# Patient Record
Sex: FEMALE | Race: WHITE | NOT HISPANIC OR LATINO | Employment: PART TIME | URBAN - METROPOLITAN AREA
[De-identification: names, ages, dates, MRNs, and addresses within clinical notes are randomized per-mention and may not be internally consistent; named-entity substitution may affect disease eponyms.]

---

## 2019-05-28 ENCOUNTER — CONVERSION ENCOUNTER (OUTPATIENT)
Dept: FAMILY MEDICINE CLINIC | Facility: CLINIC | Age: 79
End: 2019-05-28

## 2019-06-04 VITALS
HEIGHT: 63 IN | HEART RATE: 64 BPM | OXYGEN SATURATION: 97 % | SYSTOLIC BLOOD PRESSURE: 177 MMHG | DIASTOLIC BLOOD PRESSURE: 70 MMHG | BODY MASS INDEX: 27.11 KG/M2 | WEIGHT: 153 LBS

## 2019-06-06 NOTE — PROGRESS NOTES
"Visit Type:  Follow-up Visit  Primary Care Provider:  Gerda Wadsworth MD    Chief Complaint:  3 month with cholestech: stated bp \"high\" when gets up in am and bp at home runs ~ 130's/ 60-70's : bp recheck -189/75 .    History of Present Illness:    Dear Gerda    CC:  follow-up for coronary artery disease status post previous CABG, history of statin intolerance, heterozygous familial hypercholesterolemia, hypertension    Mrs Elizabeth Whaley  is a very pleasant 78 years old lady with history of:chronic CAD, status post CABG.  hypertension and dyslipidemia. She offers no complaints of chest pain     Hypertension: Her blood pressure was    177/70 although at home it is in the 130s to /60-/ 70s  but she is anxious and nervous and wants to  her rush to her appointment.    Dyslipidemia, She is  taking Crestor 10 mg And Zetia 10 mg daily and is tolerating it well with no  myalgias.      A/P/MDM:    1- CAD,   Status post CABG, no history of angina pectoris.  Stable with the no symptoms of myocardial ischemia.    2-  Uncontrolled hypertension today.  She will  keep a log and will let us know if it is consistently above 140/90 and at that time,  will modify losartan therapy    2-  Dyslipidemia.  Patient is currently tolerating combination of Crestor and ezetimibe  without any problems  Her lipid profile today showed total cholesterol 168 triglycerides 147 HDL 52 LDL of 87.  She has been informed that her LDL is still suboptimal   but she does not wish to increase the Crestor dosage at this time.  She will watch her diet which has been somewhat liberal in  ice cream use every day.  I have suggested that she cut down her ice cream intake substantially. She has  hypertriglyceridemia   (triglycerides of 147 which fits the  criterion for REDUCE-IT study).   I have provided her information about REDUCE-IT study as well as  VASCEPA. She will inform us  if she wishes to proceed and I will be happy to call in a prescription  electronically. "   Ddietary restrictions have been stressed    3- Paroxysmal atrial fibrillation-  with no further recurrence    Thanks very much for allowing us to participate in the care your patients        Vital Signs:    Patient Profile:    78 Years Old Female  Height:     63 inches (160.02 cm)  Weight:     153 pounds  BMI:        27.10     O2 Sat:     97 %  Pulse rate: 64 / minute  Pulse rhythm:   regular- radial pulse x one min.   BP Sittin / 70  (left arm)    Cuff size:  regular      Problems: Active problems were reviewed with the patient during this visit.  Medications: Medications were reviewed with the patient during this visit.  Allergies: Allergies were reviewed with the patient during this visit.        Vitals Entered By: Inge Marroquin LPN (May 28, 2019 8:23 AM)      Past Medical History:     Reviewed history from 2018 and no changes required:        Coronary Heart Disease: S/P PCI         Dyslipidemia        Hx: Palpitations-per holter supraventricular ectopics         GERD        Atrophic Vaginitis         pregnancy xs2        cancer        pneumonia        nervous disease        irregular heart beats        high blood pressure        Cataracts         Aneurysm-stomach    Past Surgical History:     Reviewed history from 2016 and no changes required:        Angioplasty/Stent: May 12,1999-single vessel CAD- Jorge Luis stent mid RCA         Cardiac Cath:May 11, 1999        Cholecystectomy        Hysterectomy        C A B G: x2 SRINATH to RCA and LIMA to LAD 2015        Cataract Extraction: 2016    Active Medications (reviewed today):  LISINOPRIL 20 MG ORAL TABLET (LISINOPRIL) Take 1 tablet by mouth daily  EZETIMIBE 10 MG TABLET (EZETIMIBE) TAKE 1 TABLET EVERY DAY  RESTORIL 15 MG ORAL CAPSULE (TEMAZEPAM) Take 1 tablet by mouth daily  ROSUVASTATIN CALCIUM 10 MG ORAL TABLET (ROSUVASTATIN CALCIUM) Take 1 tablet by mouth daily  NITROSTAT 0.4 MG SUBLINGUAL TABLET SUBLINGUAL (NITROGLYCERIN) AS DIRECTED:  DISSOLVE ONE TABLET UNDER THE TONGUE AS NEEDED FOR CHEST PAIN  OMEPRAZOLE 40 MG ORAL CAPSULE DELAYED RELEASE (OMEPRAZOLE) Take one by mouth daily  CENTRUM SILVER 50+WOMEN ORAL TABLET (MULTIPLE VITAMINS-MINERALS) Take one (1) tablet by mouth every other day.  ASPIR-LOW 81 MG ORAL TABLET DELAYED RELEASE (ASPIRIN) Take 1 tablet by mouth daily  METOPROLOL SUCCINATE ER 25 MG ORAL TABLET EXTENDED RELEASE 24 HOUR (METOPROLOL SUCCINATE) Take 1 tablet by mouth daily    Current Allergies (reviewed today):  CAFFEINE (Critical)  CODEINE (Critical)  DEMEROL (Critical)  LEVAQUIN (Critical)  SULFA (Critical)  * LISINOPRIL (Critical)    Family History Summary:      Reviewed history Last on 02/28/2019 and no changes required:05/28/2019      General Comments - FH:  FH Heart Disease- brother - MI x 5 was age < 55 with first   FH Hypertension-mother   FH Diabetes-mother      Social History:     Reviewed history from 09/18/2013 and no changes required:                2 children         Retired        Risk Factors:     Smoked Tobacco Use:  Former smoker     Cigarettes:  Yes -- .5 pack(s) per day,    Pack-years:  started age 23        Year started:  age 22        Year quit:  2012        Years Since Last Quit:  7   Passive smoke exposure:  no  Drug use:  no  HIV high-risk behavior:  no  Caffeine use:  0 drinks per day  Alcohol use:  yes     Type:  wine- 2 glasses daily   Exercise:  yes     Times per week:  5     Type of Exercise:  walk a mile on TM at speed 2.4  Seatbelt use:  100 %  Sun Exposure:  occasionally    Family History Risk Factors:     Family History of MI in females < 65 years old:  no     Family History of MI in males < 55 years old:  yes        Review of Systems   General: denies fevers, chills, sweats, anorexia, fatigue, malaise, weight loss  Eyes: denies blurring, diplopia, irritation, discharge, vision loss, eye pain, photophobia  Ear/Nose/Throat: denies ear pain or discharge, tinnitus, decreased hearing, nasal  obstruction or discharge, nosebleeds, sore throat, hoarseness, dysphagia  Cardiovascular:  coronary artery disease.  Status post previous PCI stenting.  Recent CABG.  Postoperative atrial fibrillation with no further recurrence.  Dyslipidemia with  hypertriglyceridemia (see H&P).  Respiratory: Denies cough, dyspnea, excessive sputum, hemoptysis, wheezing.  She has has history of a lisinopril induced cough  Gastrointestinal: History of GI bleeding secondary to Xarelto.  Antral polypectomy in 2015  Genitourinary: Recurrent urinary tract infections  Musculoskeletal: denies back pain, joint pain, joint swelling, muscle cramps, muscle weakness, stiffness, arthritis  Skin:    previous history of rash involving both lower legs  Neurologic: denies transient paralysis, weakness, paresthesias, seizures, syncope, tremors, vertigo  Psychiatric: denies depression, anxiety, memory loss, mental disturbance, suicidal ideation, hallucinations, paranoia  Endocrine: Denies cold intolerance, heat intolerance  Hematologic/Lymphatic: Complains of abnormal bruising, bleeding      Physical Exam    General:      well developed, well nourished, in no acute distress.    Head:      normocephalic and atraumatic.    Eyes:      PERRL/EOM intact, conjunctiva and sclera clear with out nystagmus.    Neck:      no masses, thyromegaly, or abnormal cervical nodes.   no JVD. No carotid bruits  Lungs:      clear bilaterally to auscultation.    Heart:      non-displaced PMI, chest non-tender; regular rate and rhythm, S1, S2 without murmurs, rubs, or gallops  Abdomen:       normal bowel sounds; no hepatosplenomegaly no ventral,umbilical hernias or masses noted.      No abnormal pulsations or abdominal bruits  Msk:      no deformity or scoliosis noted of thoracic or lumbar spine.    Pulses:      pulses normal in all 4 extremities.    Extremities:      no clubbing, cyanosis, edema, or deformity noted with normal full range of motion of all joints.     Neurologic:      no focal deficits, cranial nerves II-XII grossly intact with normal sensation, reflexes, coordination, muscle strength and tone.    Skin:      intact without lesions or rashes.      Diabetes Management Exam:      Foot Exam (with socks and/or shoes not present):        Pulses:           pulses normal in all 4 extremities.        Blood Pressure:  Today's BP: 177/70 mm Hg    Labwork:   Most Recent Lab Results:   LDL: 87 mg/dL 10/19/2019      Assessment   New Problems:  Dx of Hypertension (ICD-401.9)  Onset: 05/28/2019  Dx of Hypertriglyceridemia (ICD-272.1)  Onset: 05/28/2019    Plan   New medications:  LOSARTAN POTASSIUM 50 MG ORAL TABLET -- Take one (1) tablet by mouth daily.  Start date: 05/28/2019                         Medication Administration    Orders Added:  1)  Ofc Vst, Est Level IV [15419]    ]      Electronically signed by GARIMA Pires MD on 05/28/2019 at 9:03 AM  ________________________________________________________________________       Disclaimer: Converted Note message may not contain all data elements that existed in the legacy source system. Please see [employer] System for the original note details.

## 2019-07-01 RX ORDER — EZETIMIBE 10 MG/1
TABLET ORAL
Qty: 90 TABLET | Refills: 2 | Status: SHIPPED | OUTPATIENT
Start: 2019-07-01 | End: 2019-09-19

## 2019-09-18 NOTE — PROGRESS NOTES
[facility] CARDIOLOGY      REASON FOR FOLLOW-UP:  follow-up for coronary artery disease status post previous CABG, history of statin intolerance, heterozygous familial hypercholesterolemia, hypertension, paroxysmal atrial fibrillation with no further recurrence            Chief Complaint   Patient presents with   • Coronary Artery Disease     Having sweats for past 3-4 wks   • Hypertension         Dear Dr. Wadsworth,        History of Present Illness      It was my pleasure to see Ms. Whaley in the office today.  As you are aware, she is a very pleasant 78 years old female with history of CAD status post CABG.   She has history of hypertension and dyslipidemia.      Today, the patient reports that for approximately the past 3 to 4 weeks she has experienced spontaneous episodes of diaphoresis, primarily to head and neck area.  She stated the symptoms are very similar to what she experienced just prior to her CABG.  She denies any actual chest pains, pressure, tightness or palpitations.  She denies any shortness of air at rest, dyspnea with exertion, orthopnea or PND.  No lower extremity edema.  Patient stated she can simply be sitting still and suddenly feel very hot/flushed and began sweating.  Patient had recent CT chest/abdomen/pelvis with contrast.  Impression: No clear evidence of metastatic disease in the chest, stable bilateral lung nodules with six-month follow-up CT recommended, emphysema.      Assessment:  Coronary artery disease status post CABG  Dyslipidemia-suboptimally controlled  Paroxysmal atrial fibrillation-no further recurrences  Diaphoresis    Plan:  Patient symptoms are concerning for anginal equivalent.  Her last ischemic evaluation appears to have been 3 years ago.  We will schedule patient for nuclear stress testing to further risk stratify.  Further recommendations following results of testing.        The following portions of the patient's history were reviewed and  updated as appropriate: allergies, current medications, past family history, past medical history, past social history, past surgical history and problem list.    REVIEW OF SYSTEMS:    Review of Systems   Cardiovascular:        Diaphoresis   All other systems reviewed and are negative.      Vitals:    09/19/19 1320   BP: 129/74   Pulse: 68   SpO2: 97%         PHYSICAL EXAM:    General: Alert, cooperative, no distress, appears stated age  Head:  Normocephalic, atraumatic, mucous membranes moist  Eyes:  Conjunctiva/corneas clear, EOM's intact     Neck:  Supple,  no adenopathy;      Lungs: Clear to auscultation bilaterally, no wheezes rhonchi rales are noted  Chest wall: No tenderness  Musculoskeletal:   Ambulates freely without assistance  Heart::  Regular rate and rhythm, S1 and S2 normal, no murmur, rub or gallop  Abdomen: Soft, non-tender, nondistended bowel sounds active  Extremities: No cyanosis, clubbing, or edema   Pulses: 2+ and symmetric all extremities  Skin:  No rashes or lesions  Neuro/psych: A&O x3. CN II through XII are grossly intact with appropriate affect        Past Medical History:   Diagnosis Date   • Atrial fibrillation (CMS/HCC)    • Coronary artery disease    • Hyperlipidemia    • Hypertension        Past Surgical History:   Procedure Laterality Date   • CORONARY ARTERY BYPASS GRAFT           Current Outpatient Medications:   •  aspirin 81 MG tablet, Take  by mouth Daily., Disp: , Rfl:   •  ezetimibe (ZETIA) 10 MG tablet, Daily., Disp: , Rfl:   •  losartan (COZAAR) 50 MG tablet, Daily., Disp: , Rfl:   •  metoprolol succinate XL (TOPROL-XL) 25 MG 24 hr tablet, Daily., Disp: , Rfl:   •  nitrofurantoin (MACRODANTIN) 50 MG capsule, Every Night., Disp: , Rfl: 4  •  nitroglycerin (NITROSTAT) 0.4 MG SL tablet, As Needed., Disp: , Rfl:   •  omeprazole (prilOSEC) 10 MG capsule, Take 10 mg by mouth Daily., Disp: , Rfl:   •  rosuvastatin (CRESTOR) 10 MG tablet, Take 10 mg by mouth Daily., Disp: , Rfl:   •   temazepam (RESTORIL) 15 MG capsule, Take 15 mg by mouth., Disp: , Rfl:     Allergies   Allergen Reactions   • Sulfa Antibiotics Unknown (See Comments) and Rash       History reviewed. No pertinent family history.    Social History     Tobacco Use   • Smoking status: Former Smoker     Packs/day: 1.50     Types: Cigarettes     Last attempt to quit:      Years since quittin.7   • Smokeless tobacco: Never Used   Substance Use Topics   • Alcohol use: Yes     Alcohol/week: 8.4 oz     Types: 14 Glasses of wine per week           Current Electrocardiogram:    ECG 12 Lead  Date/Time: 2019 4:05 PM  Performed by: Georgia Bee APRN  Authorized by: Georgia Bee APRN   Comparison: not compared with previous ECG   Rhythm: sinus rhythm              COCO Garcia  19  12:22 PM

## 2019-09-19 ENCOUNTER — OFFICE VISIT (OUTPATIENT)
Dept: CARDIOLOGY | Facility: CLINIC | Age: 79
End: 2019-09-19

## 2019-09-19 ENCOUNTER — TELEPHONE (OUTPATIENT)
Dept: CARDIOLOGY | Facility: CLINIC | Age: 79
End: 2019-09-19

## 2019-09-19 VITALS
BODY MASS INDEX: 26.75 KG/M2 | WEIGHT: 151 LBS | DIASTOLIC BLOOD PRESSURE: 74 MMHG | SYSTOLIC BLOOD PRESSURE: 129 MMHG | OXYGEN SATURATION: 97 % | HEART RATE: 68 BPM

## 2019-09-19 DIAGNOSIS — R61 DIAPHORESIS: ICD-10-CM

## 2019-09-19 DIAGNOSIS — R07.89 CHEST PAIN, ATYPICAL: ICD-10-CM

## 2019-09-19 DIAGNOSIS — I25.119 CORONARY ARTERY DISEASE WITH ANGINA PECTORIS, UNSPECIFIED VESSEL OR LESION TYPE, UNSPECIFIED WHETHER NATIVE OR TRANSPLANTED HEART (HCC): Primary | ICD-10-CM

## 2019-09-19 DIAGNOSIS — Z95.1 S/P CABG (CORONARY ARTERY BYPASS GRAFT): ICD-10-CM

## 2019-09-19 PROCEDURE — 99213 OFFICE O/P EST LOW 20 MIN: CPT | Performed by: NURSE PRACTITIONER

## 2019-09-19 RX ORDER — LOSARTAN POTASSIUM 50 MG/1
75 TABLET ORAL DAILY
COMMUNITY
Start: 2019-08-12

## 2019-09-19 RX ORDER — NITROGLYCERIN 0.4 MG/1
TABLET SUBLINGUAL AS NEEDED
COMMUNITY
Start: 2015-07-02 | End: 2022-06-10

## 2019-09-19 RX ORDER — OMEPRAZOLE 10 MG/1
10 CAPSULE, DELAYED RELEASE ORAL DAILY
COMMUNITY
End: 2021-06-09

## 2019-09-19 RX ORDER — EZETIMIBE 10 MG/1
TABLET ORAL EVERY 24 HOURS
COMMUNITY
Start: 2018-07-16 | End: 2020-01-07 | Stop reason: SDUPTHER

## 2019-09-19 RX ORDER — METOPROLOL SUCCINATE 25 MG/1
TABLET, EXTENDED RELEASE ORAL DAILY
COMMUNITY
Start: 2019-08-12 | End: 2019-11-25 | Stop reason: SDUPTHER

## 2019-09-19 RX ORDER — ROSUVASTATIN CALCIUM 10 MG/1
10 TABLET, COATED ORAL DAILY
COMMUNITY
End: 2019-09-23 | Stop reason: SDUPTHER

## 2019-09-19 RX ORDER — NITROFURANTOIN MACROCRYSTALS 50 MG/1
50 CAPSULE ORAL NIGHTLY
Refills: 4 | COMMUNITY
Start: 2019-08-18

## 2019-09-19 RX ORDER — TEMAZEPAM 15 MG/1
15 CAPSULE ORAL NIGHTLY PRN
COMMUNITY
Start: 2017-07-19

## 2019-09-19 NOTE — TELEPHONE ENCOUNTER
Patient called office states has recently been feeling bad / episodes of diaphoresis - symptoms are intermittent.  Requested appt / advised Dr. Pires out of office will schedule appt with Moon Bee NP . Pt agreeable.

## 2019-09-20 PROBLEM — R61 DIAPHORESIS: Status: ACTIVE | Noted: 2019-09-20

## 2019-09-20 PROBLEM — I25.119 CORONARY ARTERY DISEASE WITH ANGINA PECTORIS: Status: ACTIVE | Noted: 2019-09-20

## 2019-09-20 PROBLEM — Z95.1 S/P CABG (CORONARY ARTERY BYPASS GRAFT): Status: ACTIVE | Noted: 2019-09-20

## 2019-09-23 RX ORDER — ROSUVASTATIN CALCIUM 10 MG/1
TABLET, COATED ORAL
Qty: 90 TABLET | Refills: 2 | Status: SHIPPED | OUTPATIENT
Start: 2019-09-23 | End: 2020-03-23 | Stop reason: SDUPTHER

## 2019-09-26 ENCOUNTER — HOSPITAL ENCOUNTER (OUTPATIENT)
Dept: CARDIOLOGY | Facility: HOSPITAL | Age: 79
Discharge: HOME | End: 2019-09-26

## 2019-09-26 ENCOUNTER — HOSPITAL ENCOUNTER (OUTPATIENT)
Dept: CARDIOLOGY | Facility: HOSPITAL | Age: 79
Discharge: HOME | End: 2019-09-26
Admitting: NURSE PRACTITIONER

## 2019-09-26 DIAGNOSIS — R07.89 CHEST PAIN, ATYPICAL: ICD-10-CM

## 2019-09-26 DIAGNOSIS — I25.119 CORONARY ARTERY DISEASE WITH ANGINA PECTORIS, UNSPECIFIED VESSEL OR LESION TYPE, UNSPECIFIED WHETHER NATIVE OR TRANSPLANTED HEART (HCC): ICD-10-CM

## 2019-09-26 PROCEDURE — 0 TECHNETIUM SESTAMIBI: Performed by: INTERNAL MEDICINE

## 2019-09-26 PROCEDURE — 93017 CV STRESS TEST TRACING ONLY: CPT

## 2019-09-26 PROCEDURE — A9500 TC99M SESTAMIBI: HCPCS | Performed by: INTERNAL MEDICINE

## 2019-09-26 PROCEDURE — 78452 HT MUSCLE IMAGE SPECT MULT: CPT

## 2019-09-26 PROCEDURE — 0 TECHNETIUM SESTAMIBI: Performed by: NURSE PRACTITIONER

## 2019-09-26 PROCEDURE — A9500 TC99M SESTAMIBI: HCPCS | Performed by: NURSE PRACTITIONER

## 2019-09-26 RX ADMIN — TECHNETIUM TC 99M SESTAMIBI 1 DOSE: 1 INJECTION INTRAVENOUS at 09:55

## 2019-09-26 RX ADMIN — TECHNETIUM TC 99M SESTAMIBI 1 DOSE: 1 INJECTION INTRAVENOUS at 08:35

## 2019-09-30 LAB
BH CV STRESS BP STAGE 1: NORMAL
BH CV STRESS BP STAGE 2: NORMAL
BH CV STRESS BP STAGE 3: NORMAL
BH CV STRESS DURATION MIN STAGE 1: 3
BH CV STRESS DURATION MIN STAGE 2: 3
BH CV STRESS DURATION MIN STAGE 3: 3
BH CV STRESS DURATION SEC STAGE 1: 0
BH CV STRESS DURATION SEC STAGE 2: 0
BH CV STRESS DURATION SEC STAGE 3: 0
BH CV STRESS GRADE STAGE 1: 10
BH CV STRESS GRADE STAGE 2: 12
BH CV STRESS GRADE STAGE 3: 14
BH CV STRESS HR STAGE 1: 94
BH CV STRESS HR STAGE 2: 108
BH CV STRESS HR STAGE 3: 156
BH CV STRESS METS STAGE 1: 5
BH CV STRESS METS STAGE 2: 7.5
BH CV STRESS METS STAGE 3: 10
BH CV STRESS PROTOCOL 1: NORMAL
BH CV STRESS RECOVERY BP: NORMAL MMHG
BH CV STRESS RECOVERY HR: 103 BPM
BH CV STRESS SPEED STAGE 1: 1.7
BH CV STRESS SPEED STAGE 2: 2.5
BH CV STRESS SPEED STAGE 3: 3.4
BH CV STRESS STAGE 1: 1
BH CV STRESS STAGE 2: 2
BH CV STRESS STAGE 3: 3
LV EF NUC BP: 65 %
MAXIMAL PREDICTED HEART RATE: 143 BPM
PERCENT MAX PREDICTED HR: 109.09 %
STRESS BASELINE BP: NORMAL MMHG
STRESS BASELINE HR: 60 BPM
STRESS PERCENT HR: 128 %
STRESS POST EXERCISE DUR MIN: 7 MIN
STRESS POST EXERCISE DUR SEC: 40 SEC
STRESS POST PEAK BP: NORMAL MMHG
STRESS POST PEAK HR: 156 BPM
STRESS TARGET HR: 122 BPM

## 2019-09-30 PROCEDURE — 93018 CV STRESS TEST I&R ONLY: CPT | Performed by: INTERNAL MEDICINE

## 2019-09-30 PROCEDURE — 93016 CV STRESS TEST SUPVJ ONLY: CPT | Performed by: INTERNAL MEDICINE

## 2019-09-30 PROCEDURE — 78452 HT MUSCLE IMAGE SPECT MULT: CPT | Performed by: INTERNAL MEDICINE

## 2019-10-16 ENCOUNTER — OFFICE VISIT (OUTPATIENT)
Dept: CARDIOLOGY | Facility: CLINIC | Age: 79
End: 2019-10-16

## 2019-10-16 VITALS
BODY MASS INDEX: 26.22 KG/M2 | OXYGEN SATURATION: 96 % | HEIGHT: 63 IN | WEIGHT: 148 LBS | DIASTOLIC BLOOD PRESSURE: 71 MMHG | HEART RATE: 61 BPM | SYSTOLIC BLOOD PRESSURE: 135 MMHG

## 2019-10-16 DIAGNOSIS — I48.0 PAROXYSMAL ATRIAL FIBRILLATION (HCC): ICD-10-CM

## 2019-10-16 DIAGNOSIS — I25.810 CORONARY ARTERY DISEASE INVOLVING CORONARY BYPASS GRAFT OF NATIVE HEART WITHOUT ANGINA PECTORIS: Primary | ICD-10-CM

## 2019-10-16 DIAGNOSIS — E78.2 MIXED HYPERLIPIDEMIA: ICD-10-CM

## 2019-10-16 PROCEDURE — 99213 OFFICE O/P EST LOW 20 MIN: CPT | Performed by: INTERNAL MEDICINE

## 2019-10-16 NOTE — PROGRESS NOTES
"Dear Gerda     CC:  follow-up for coronary artery disease status post previous CABG, history of statin intolerance, heterozygous familial hypercholesterolemia, hypertension     Mrs Elizabeth Whaley  is a very pleasant 78 years old lady with history of:chronic CAD, status post CABG.  hypertension and dyslipidemia. She offers no complaints of chest pain   Was recently seen by our nurse practitioner came on 919 because of unexplained sweating and she had no chest pain dyspnea palpitations presyncope or syncope.  Myocardial perfusion imaging study however showed no evidence of inducible myocardial ischemia.  LV ejection fraction was 65%.    Unfortunately, I am unable to access the EKG from 9/19/2019 in in the Epic system    Patient is feeling fine now with no further episodes of sweating.  She recently saw you in the office in some labs were done.   /71 (BP Location: Right arm)   Pulse 61   Ht 160 cm (63\")   Wt 67.1 kg (148 lb)   SpO2 96%   BMI 26.22 kg/m²      A/P/MDM:     1- CAD,   Status post CABG, no history of angina pectoris.  Stable with the no symptoms of myocardial ischemia..  Recent myocardial perfusion imaging study showed no evidence of ischemia.  Her blood pressure today was 135/71 today    2-  Hypertension stable.    2-  Dyslipidemia.  Patient is currently tolerating combination of Crestor and ezetimibe  without any problems  Her lipid profile last showed total cholesterol 168 triglycerides 147 HDL 52 LDL of 87.  She has been informed that her LDL is still suboptimal   but she does not wish to increase the Crestor dosage at this time.  She will watch her diet which has been somewhat liberal in  ice cream use every day.  I have suggested that she cut down her ice cream intake substantially. She has  hypertriglyceridemia   (triglycerides of 147 which fits the  criterion for REDUCE-IT study).   I have provided her information about REDUCE-IT study as well as  VASCEPA. She will inform us  if she wishes " to proceed and I will be happy to call in a prescription  electronically.        3- Paroxysmal atrial fibrillation-  with no further recurrence     Thanks very much for allowing us to participate in the care your patients           Vital Signs:     Patient Profile:    78 Years Old Female  Height:     63 inches (160.02 cm)  Weight:     153 pounds  BMI:        27.10     O2 Sat:     97 %  Pulse rate: 64 / minute  Pulse rhythm:   regular- radial pulse x one min.   BP Sittin / 70  (left arm)     Cuff size:  regular        Problems: Active problems were reviewed with the patient during this visit.  Medications: Medications were reviewed with the patient during this visit.  Allergies: Allergies were reviewed with the patient during this visit.           Vitals Entered By: Inge Marroquin LPN (May 28, 2019 8:23 AM)        Past Medical History:     Reviewed history from 2018 and no changes required:        Coronary Heart Disease: S/P PCI         Dyslipidemia        Hx: Palpitations-per holter supraventricular ectopics         GERD        Atrophic Vaginitis         pregnancy xs2        cancer        pneumonia        nervous disease        irregular heart beats        high blood pressure        Cataracts         Aneurysm-stomach     Past Surgical History:     Reviewed history from 2016 and no changes required:        Angioplasty/Stent: May 12,1999-single vessel CAD- Jorge Luis stent mid RCA         Cardiac Cath:May 11, 1999        Cholecystectomy        Hysterectomy        C A B G: x2 SRINATH to RCA and LIMA to LAD 2015        Cataract Extraction: 2016     Active Medications (reviewed today):  LISINOPRIL 20 MG ORAL TABLET (LISINOPRIL) Take 1 tablet by mouth daily  EZETIMIBE 10 MG TABLET (EZETIMIBE) TAKE 1 TABLET EVERY DAY  RESTORIL 15 MG ORAL CAPSULE (TEMAZEPAM) Take 1 tablet by mouth daily  ROSUVASTATIN CALCIUM 10 MG ORAL TABLET (ROSUVASTATIN CALCIUM) Take 1 tablet by mouth daily  NITROSTAT 0.4 MG SUBLINGUAL  TABLET SUBLINGUAL (NITROGLYCERIN) AS DIRECTED: DISSOLVE ONE TABLET UNDER THE TONGUE AS NEEDED FOR CHEST PAIN  OMEPRAZOLE 40 MG ORAL CAPSULE DELAYED RELEASE (OMEPRAZOLE) Take one by mouth daily  CENTRUM SILVER 50+WOMEN ORAL TABLET (MULTIPLE VITAMINS-MINERALS) Take one (1) tablet by mouth every other day.  ASPIR-LOW 81 MG ORAL TABLET DELAYED RELEASE (ASPIRIN) Take 1 tablet by mouth daily  METOPROLOL SUCCINATE ER 25 MG ORAL TABLET EXTENDED RELEASE 24 HOUR (METOPROLOL SUCCINATE) Take 1 tablet by mouth daily     Current Allergies (reviewed today):  CAFFEINE (Critical)  CODEINE (Critical)  DEMEROL (Critical)  LEVAQUIN (Critical)  SULFA (Critical)  * LISINOPRIL (Critical)     Family History Summary:      Reviewed history Last on 02/28/2019 and no changes required:05/28/2019        General Comments - FH:  FH Heart Disease- brother - MI x 5 was age < 55 with first   FH Hypertension-mother   FH Diabetes-mother        Social History:     Reviewed history from 09/18/2013 and no changes required:                2 children         Retired           Risk Factors:      Smoked Tobacco Use:  Former smoker     Cigarettes:  Yes -- .5 pack(s) per day,    Pack-years:  started age 23        Year started:  age 22        Year quit:  2012        Years Since Last Quit:  7   Passive smoke exposure:  no  Drug use:  no  HIV high-risk behavior:  no  Caffeine use:  0 drinks per day  Alcohol use:  yes     Type:  wine- 2 glasses daily   Exercise:  yes     Times per week:  5     Type of Exercise:  walk a mile on TM at speed 2.4  Seatbelt use:  100 %  Sun Exposure:  occasionally     Family History Risk Factors:     Family History of MI in females < 65 years old:  no     Family History of MI in males < 55 years old:  yes           Review of Systems   General: denies fevers, chills, sweats, anorexia, fatigue, malaise, weight loss  Eyes: denies blurring, diplopia, irritation, discharge, vision loss, eye pain, photophobia  Ear/Nose/Throat: denies  ear pain or discharge, tinnitus, decreased hearing, nasal obstruction or discharge, nosebleeds, sore throat, hoarseness, dysphagia  Cardiovascular:  coronary artery disease.  Status post previous PCI stenting.  Recent CABG.  Postoperative atrial fibrillation with no further recurrence.  Dyslipidemia with  hypertriglyceridemia (see H&P).  Respiratory: Denies cough, dyspnea, excessive sputum, hemoptysis, wheezing.  She has has history of a lisinopril induced cough  Gastrointestinal: History of GI bleeding secondary to Xarelto.  Antral polypectomy in 2015  Genitourinary: Recurrent urinary tract infections  Musculoskeletal: denies back pain, joint pain, joint swelling, muscle cramps, muscle weakness, stiffness, arthritis  Skin:    previous history of rash involving both lower legs  Neurologic: denies transient paralysis, weakness, paresthesias, seizures, syncope, tremors, vertigo  Psychiatric: denies depression, anxiety, memory loss, mental disturbance, suicidal ideation, hallucinations, paranoia  Endocrine: Denies cold intolerance, heat intolerance  Hematologic/Lymphatic: Complains of abnormal bruising, bleeding        Physical Exam     General:      well developed, well nourished, in no acute distress.    Head:      normocephalic and atraumatic.    Eyes:      PERRL/EOM intact, conjunctiva and sclera clear with out nystagmus.    Neck:      no masses, thyromegaly, or abnormal cervical nodes.   no JVD. No carotid bruits  Lungs:      clear bilaterally to auscultation.    Heart:      non-displaced PMI, chest non-tender; regular rate and rhythm, S1, S2 without murmurs, rubs, or gallops  Abdomen:       normal bowel sounds; no hepatosplenomegaly no ventral,umbilical hernias or masses noted.       No abnormal pulsations or abdominal bruits  Msk:      no deformity or scoliosis noted of thoracic or lumbar spine.    Pulses:      pulses normal in all 4 extremities.    Extremities:      no clubbing, cyanosis, edema, or deformity  noted with normal full range of motion of all joints.    Neurologic:      no focal deficits, cranial nerves II-XII grossly intact with normal sensation, reflexes, coordination, muscle strength and tone.    Skin:      intact without lesions or rashes.

## 2019-11-25 RX ORDER — METOPROLOL SUCCINATE 25 MG/1
TABLET, EXTENDED RELEASE ORAL
Qty: 90 TABLET | Refills: 0 | Status: SHIPPED | OUTPATIENT
Start: 2019-11-25 | End: 2020-02-24 | Stop reason: SDUPTHER

## 2020-01-07 RX ORDER — EZETIMIBE 10 MG/1
10 TABLET ORAL DAILY
Qty: 30 TABLET | Refills: 0 | Status: SHIPPED | OUTPATIENT
Start: 2020-01-07 | End: 2020-02-11 | Stop reason: SDUPTHER

## 2020-02-11 RX ORDER — EZETIMIBE 10 MG/1
10 TABLET ORAL DAILY
Qty: 30 TABLET | Refills: 0 | Status: SHIPPED | OUTPATIENT
Start: 2020-02-11 | End: 2020-03-16

## 2020-02-11 RX ORDER — EZETIMIBE 10 MG/1
10 TABLET ORAL DAILY
Qty: 30 TABLET | Refills: 0 | Status: SHIPPED | OUTPATIENT
Start: 2020-02-11 | End: 2020-05-11

## 2020-02-24 RX ORDER — METOPROLOL SUCCINATE 25 MG/1
25 TABLET, EXTENDED RELEASE ORAL DAILY
Qty: 90 TABLET | Refills: 0 | Status: SHIPPED | OUTPATIENT
Start: 2020-02-24 | End: 2020-06-02 | Stop reason: SDUPTHER

## 2020-02-28 ENCOUNTER — TELEPHONE (OUTPATIENT)
Dept: CARDIOLOGY | Facility: CLINIC | Age: 80
End: 2020-02-28

## 2020-02-28 NOTE — TELEPHONE ENCOUNTER
Patient came in wanting to have blood pressure checked. She had previously stopped by her PCP and they told her to come to our office. I spoke with Mariana and she stated we didnt have a Provider in the office who could address her  blood pressure issue.  I told the patient she could go to local Immediate Care and or ER. I told Patient I could schedule her for a first available appt but she said she would call. Patient was upset and left the office.

## 2020-03-11 ENCOUNTER — ON CAMPUS - OUTPATIENT (AMBULATORY)
Dept: URBAN - METROPOLITAN AREA HOSPITAL 77 | Facility: HOSPITAL | Age: 80
End: 2020-03-11
Payer: COMMERCIAL

## 2020-03-11 DIAGNOSIS — R59.0 LOCALIZED ENLARGED LYMPH NODES: ICD-10-CM

## 2020-03-11 PROCEDURE — 43237 ENDOSCOPIC US EXAM ESOPH: CPT | Performed by: INTERNAL MEDICINE

## 2020-03-16 RX ORDER — EZETIMIBE 10 MG/1
10 TABLET ORAL DAILY
Qty: 30 TABLET | Refills: 0 | Status: SHIPPED | OUTPATIENT
Start: 2020-03-16 | End: 2021-09-07

## 2020-03-23 RX ORDER — ROSUVASTATIN CALCIUM 10 MG/1
10 TABLET, COATED ORAL DAILY
Qty: 90 TABLET | Refills: 0 | Status: SHIPPED | OUTPATIENT
Start: 2020-03-23 | End: 2020-06-29

## 2020-04-29 ENCOUNTER — OFFICE VISIT (OUTPATIENT)
Dept: CARDIOLOGY | Facility: CLINIC | Age: 80
End: 2020-04-29

## 2020-04-29 VITALS
HEIGHT: 62 IN | HEART RATE: 57 BPM | WEIGHT: 148 LBS | OXYGEN SATURATION: 50 % | SYSTOLIC BLOOD PRESSURE: 158 MMHG | DIASTOLIC BLOOD PRESSURE: 63 MMHG | BODY MASS INDEX: 27.23 KG/M2

## 2020-04-29 DIAGNOSIS — I10 ESSENTIAL HYPERTENSION: ICD-10-CM

## 2020-04-29 DIAGNOSIS — I25.10 CORONARY ARTERY DISEASE INVOLVING NATIVE CORONARY ARTERY OF NATIVE HEART WITHOUT ANGINA PECTORIS: Primary | ICD-10-CM

## 2020-04-29 DIAGNOSIS — Z95.1 S/P CABG (CORONARY ARTERY BYPASS GRAFT): ICD-10-CM

## 2020-04-29 DIAGNOSIS — Z79.02 LONG TERM (CURRENT) USE OF ANTITHROMBOTICS/ANTIPLATELETS: ICD-10-CM

## 2020-04-29 DIAGNOSIS — E78.2 MIXED HYPERLIPIDEMIA: ICD-10-CM

## 2020-04-29 DIAGNOSIS — I25.119 CORONARY ARTERY DISEASE WITH ANGINA PECTORIS, UNSPECIFIED VESSEL OR LESION TYPE, UNSPECIFIED WHETHER NATIVE OR TRANSPLANTED HEART (HCC): ICD-10-CM

## 2020-04-29 PROCEDURE — 93000 ELECTROCARDIOGRAM COMPLETE: CPT | Performed by: INTERNAL MEDICINE

## 2020-04-29 PROCEDURE — 99214 OFFICE O/P EST MOD 30 MIN: CPT | Performed by: INTERNAL MEDICINE

## 2020-04-29 RX ORDER — ESCITALOPRAM OXALATE 5 MG/1
5 TABLET ORAL NIGHTLY
COMMUNITY

## 2020-04-29 NOTE — PROGRESS NOTES
"Cardiology Office Visit      Encounter Date:  04/29/2020    Patient ID:   Elizabeth Whaley is a 79 y.o. female.    Reason For Followup:  Coronary artery disease  Hypertension  Hypertensive cardiovascular disease  Hyperlipidemia    Brief Clinical History:  Dear Dr. Wadsworth, Gerda Diamond MD    I had the pleasure of seeing Elizabeth Whaley today. As you are well aware, this is a 79 y.o. female with a history of coronary artery disease she is undergone coronary arterial bypass grafting in 2015.  She has additional history that includes hypertension, hypertensive cardiovascular disease, hyperlipidemia, and antiplatelet therapy.  She presents today for follow-up on the above conditions.    Interval History:  She denies any chest pain pressure heaviness or tightness.  She denies any shortness of breath.  She denies any PND orthopnea.  She denies any syncope or near syncope.  She reports feeling well from a cardiac perspective.    She is currently furloughed from [employer].  She reports that her blood pressure is normally well controlled.  In fact this morning before the office visit, her blood pressure was 124/67.  She had a nuclear stress test in November that demonstrated no evidence of ischemia.    Assessment & Plan    Impressions:  Coronary artery disease status post two-vessel CABG in May 2015      SRINATH-RCA, LIMA-LAD  Hypertension  Hypertensive cardiovascular disease  Hyperlipidemia    Recommendations:  Continuation of her current cardiovascular regimen at the present time.  Follow-up in 6 months time sooner should there be difficulties.    Objective:    Vitals:  Vitals:    04/29/20 0847   BP: 158/63   Pulse: 57   SpO2: (!) 50%   Weight: 67.1 kg (148 lb)   Height: 157.5 cm (62\")       Physical Exam:    General: Alert, cooperative, no distress, appears stated age  Head:  Normocephalic, atraumatic, mucous membranes moist  Eyes:  Conjunctiva/corneas clear, EOM's intact     Neck:  Supple,  no bruit  Lungs: Clear " to auscultation bilaterally, no wheezes rhonchi rales are noted  Chest wall: No tenderness  Heart::  Regular rate and rhythm, S1 and S2 normal, 1/6 holosystolic murmur.  No rub or gallop  Abdomen: Soft, non-tender, nondistended bowel sounds active  Extremities: No cyanosis, clubbing, or edema  Pulses: 2+ and symmetric all extremities  Skin:  No rashes or lesions  Neuro/psych: A&O x3. CN II through XII are grossly intact with appropriate affect      Allergies:  Allergies   Allergen Reactions   • Sulfa Antibiotics Unknown (See Comments) and Rash       Medication Review:     Current Outpatient Medications:   •  aspirin 81 MG tablet, Take  by mouth Daily., Disp: , Rfl:   •  escitalopram (LEXAPRO) 5 MG tablet, Take 5 mg by mouth Daily., Disp: , Rfl:   •  ezetimibe (ZETIA) 10 MG tablet, TAKE 1 TABLET BY MOUTH DAILY, Disp: 30 tablet, Rfl: 0  •  losartan (COZAAR) 50 MG tablet, Daily. Take 1 1/2 tab daily, Disp: , Rfl:   •  metoprolol succinate XL (TOPROL-XL) 25 MG 24 hr tablet, Take 1 tablet by mouth Daily., Disp: 90 tablet, Rfl: 0  •  nitrofurantoin (MACRODANTIN) 50 MG capsule, Every Night., Disp: , Rfl: 4  •  nitroglycerin (NITROSTAT) 0.4 MG SL tablet, As Needed., Disp: , Rfl:   •  omeprazole (prilOSEC) 10 MG capsule, Take 10 mg by mouth Daily., Disp: , Rfl:   •  rosuvastatin (CRESTOR) 10 MG tablet, Take 1 tablet by mouth Daily., Disp: 90 tablet, Rfl: 0  •  temazepam (RESTORIL) 15 MG capsule, Take 15 mg by mouth., Disp: , Rfl:   •  ezetimibe (ZETIA) 10 MG tablet, TAKE 1 TABLET BY MOUTH DAILY, Disp: 30 tablet, Rfl: 0    Family History:  Family History   Problem Relation Age of Onset   • Diabetes Mother        Past Medical History:  Past Medical History:   Diagnosis Date   • Atrial fibrillation (CMS/HCC)    • Coronary artery disease    • Hyperlipidemia    • Hypertension    • Palpitations        Past surgical History:  Past Surgical History:   Procedure Laterality Date   • CARDIAC CATHETERIZATION     • CORONARY ARTERY BYPASS  GRAFT         Social History:  Social History     Socioeconomic History   • Marital status:      Spouse name: Not on file   • Number of children: Not on file   • Years of education: Not on file   • Highest education level: Not on file   Tobacco Use   • Smoking status: Former Smoker     Packs/day: 1.50     Types: Cigarettes     Last attempt to quit: 2015     Years since quittin.3   • Smokeless tobacco: Never Used   Substance and Sexual Activity   • Alcohol use: Yes     Alcohol/week: 14.0 standard drinks     Types: 14 Glasses of wine per week     Comment: routinely    • Drug use: No       Review of Systems:  The following systems were reviewed as they relate to the cardiovascular system: Constitutional, Eyes, ENT, Cardiovascular, Respiratory, Gastrointestinal, Integumentary, Neurological, Psychiatric, Hematologic, Endocrine, Musculoskeletal, and Genitourinary. The pertinent cardiovascular findings are reported above with all other cardiovascular points within those systems being negative.    Diagnostic Study Review:     Current Electrocardiogram:    ECG 12 Lead  Date/Time: 2020 12:43 PM  Performed by: Jagdeep Perez DO  Authorized by: Jagdeep Perez DO   Comparison: not compared with previous ECG   Previous ECG: no previous ECG available  Comments: Sinus bradycardia with a ventricular to 57 bpm.  Right axis deviation.  Normal QT and QTc intervals.              NOTE: The following portions of the patient's history were reviewed and updated this visit as appropriate: allergies, current medications, past family history, past medical history, past social history, past surgical history and problem list.

## 2020-05-11 RX ORDER — EZETIMIBE 10 MG/1
10 TABLET ORAL DAILY
Qty: 30 TABLET | Refills: 0 | Status: SHIPPED | OUTPATIENT
Start: 2020-05-11 | End: 2020-06-15

## 2020-06-02 RX ORDER — METOPROLOL SUCCINATE 25 MG/1
25 TABLET, EXTENDED RELEASE ORAL DAILY
Qty: 90 TABLET | Refills: 0 | Status: SHIPPED | OUTPATIENT
Start: 2020-06-02 | End: 2020-06-03 | Stop reason: SDUPTHER

## 2020-06-03 RX ORDER — METOPROLOL SUCCINATE 25 MG/1
25 TABLET, EXTENDED RELEASE ORAL DAILY
Qty: 90 TABLET | Refills: 2 | Status: SHIPPED | OUTPATIENT
Start: 2020-06-03 | End: 2021-02-01

## 2020-06-15 RX ORDER — EZETIMIBE 10 MG/1
10 TABLET ORAL DAILY
Qty: 30 TABLET | Refills: 5 | Status: SHIPPED | OUTPATIENT
Start: 2020-06-15 | End: 2020-12-07

## 2020-06-29 RX ORDER — ROSUVASTATIN CALCIUM 10 MG/1
10 TABLET, COATED ORAL DAILY
Qty: 90 TABLET | Refills: 0 | Status: SHIPPED | OUTPATIENT
Start: 2020-06-29 | End: 2020-09-28

## 2020-07-20 ENCOUNTER — ON CAMPUS - OUTPATIENT (AMBULATORY)
Dept: URBAN - METROPOLITAN AREA HOSPITAL 77 | Facility: HOSPITAL | Age: 80
End: 2020-07-20
Payer: COMMERCIAL

## 2020-07-20 DIAGNOSIS — R93.3 ABNORMAL FINDINGS ON DIAGNOSTIC IMAGING OF OTHER PARTS OF DI: ICD-10-CM

## 2020-07-20 DIAGNOSIS — K86.1 OTHER CHRONIC PANCREATITIS: ICD-10-CM

## 2020-07-20 DIAGNOSIS — K31.7 POLYP OF STOMACH AND DUODENUM: ICD-10-CM

## 2020-07-20 PROCEDURE — 43251 EGD REMOVE LESION SNARE: CPT | Performed by: INTERNAL MEDICINE

## 2020-07-27 PROCEDURE — 43237 ENDOSCOPIC US EXAM ESOPH: CPT | Mod: 59 | Performed by: INTERNAL MEDICINE

## 2020-08-18 ENCOUNTER — TELEPHONE (OUTPATIENT)
Dept: CARDIOLOGY | Facility: CLINIC | Age: 80
End: 2020-08-18

## 2020-08-18 NOTE — TELEPHONE ENCOUNTER
If her blood pressures in the 50s she needs to come to the office.      Previous Messages      ----- Message -----   From: Laureen Snyder MA   Sent: 8/12/2020   1:44 PM EDT   To: Jagdeep Perez DO   Subject: Low b/p                                           Patient called states that she is feeling dizzy and when standing feels like she is losing her balance.     Patient saw Dr Wadsworth and she suggested the patient contact you regarding b/p being in the 50's.   She did have blood work done with Dr Wadsworth and still waiting results to see if could possibly be low blood sugar.     Patient wants to know if she needs an appt with you or if her meds need to be adjusted     I advised her to contact our office once her lab results came back. She agreed and I told her I would send the message to you as well.     Please advise   thanks         8/18/2020  Per Dr Perez patient needs to be seen. Message given to  to call and schedule an appt.

## 2020-08-19 ENCOUNTER — OFFICE VISIT (OUTPATIENT)
Dept: CARDIOLOGY | Facility: CLINIC | Age: 80
End: 2020-08-19

## 2020-08-19 VITALS
SYSTOLIC BLOOD PRESSURE: 167 MMHG | WEIGHT: 148 LBS | HEART RATE: 64 BPM | RESPIRATION RATE: 18 BRPM | OXYGEN SATURATION: 98 % | HEIGHT: 62 IN | DIASTOLIC BLOOD PRESSURE: 66 MMHG | BODY MASS INDEX: 27.23 KG/M2

## 2020-08-19 DIAGNOSIS — E78.2 MIXED HYPERLIPIDEMIA: ICD-10-CM

## 2020-08-19 DIAGNOSIS — I10 ESSENTIAL HYPERTENSION: ICD-10-CM

## 2020-08-19 DIAGNOSIS — R61 DIAPHORESIS: ICD-10-CM

## 2020-08-19 DIAGNOSIS — Z79.02 LONG TERM (CURRENT) USE OF ANTITHROMBOTICS/ANTIPLATELETS: ICD-10-CM

## 2020-08-19 DIAGNOSIS — Z95.1 S/P CABG (CORONARY ARTERY BYPASS GRAFT): ICD-10-CM

## 2020-08-19 DIAGNOSIS — I25.119 CORONARY ARTERY DISEASE WITH ANGINA PECTORIS, UNSPECIFIED VESSEL OR LESION TYPE, UNSPECIFIED WHETHER NATIVE OR TRANSPLANTED HEART (HCC): Primary | ICD-10-CM

## 2020-08-19 PROCEDURE — 99214 OFFICE O/P EST MOD 30 MIN: CPT | Performed by: INTERNAL MEDICINE

## 2020-08-19 RX ORDER — FLUTICASONE PROPIONATE 50 MCG
SPRAY, SUSPENSION (ML) NASAL
COMMUNITY
Start: 2020-02-13 | End: 2021-06-09

## 2020-08-19 NOTE — PROGRESS NOTES
Cardiology Office Visit      Encounter Date:  08/19/2020    Patient ID:   Elizabeth Whaley is a 79 y.o. female.    Reason For Followup:  Coronary artery disease  Hypertension  Hypertensive cardiovascular disease  Hyperlipidemia    Brief Clinical History:  Dear Dr. Wadsworth, Gerda Diamond MD    I had the pleasure of seeing Elizabeth Whaley today. As you are well aware, this is a 79 y.o. female with a history of coronary artery disease she is undergone coronary arterial bypass grafting in 2015.  She has additional history that includes hypertension, hypertensive cardiovascular disease, hyperlipidemia, and antiplatelet therapy.  She presents today for follow-up on the above conditions.    Interval History:  She denies any chest pain pressure heaviness or tightness.  She denies any shortness of breath.  She denies any PND orthopnea.  She denies any syncope or near syncope.  She reports feeling well from a cardiac perspective.    Her blood pressure is elevated today however she reports that her blood pressure is normally well controlled.  She had a nuclear stress test in November that demonstrated no evidence of ischemia.    She reports that she continues to have some very bizarre sweating episodes.  She feels like what she thinks 1 would feel when the blood sugar is low.  She states if she eats a piece of candy after some time she feels normal.  She has not checked her blood sugar or blood pressure during the spells.  She does not know what her heart rate is during these episodes either.  She has a history of bradycardia with rates in the 50s.  Her heart rate is normal today at 64.  She wears a fit bit and she notes that her heart rate ranges are never below 50 beats a minute.  We discussed options and will continue with her current medical regimen at the present time.  She will try to remember to take her glucose monitor with her as well as her blood pressure cuff so that she can check her blood pressure and blood sugars when  "these episodes occur.  We can also consider decreasing her beta-blocker dosage if these continue to be problematic episodes.    Assessment & Plan    Impressions:  Coronary artery disease status post two-vessel CABG in May 2015      SRINATH-RCA, LIMA-LAD  Hypertension  Hypertensive cardiovascular disease  Hyperlipidemia  Sweating episodes    Recommendations:  Continuation of her current cardiovascular regimen at the present time.  Follow-up in 3 months time as currently scheduled.    Objective:    Vitals:  Vitals:    08/19/20 1348   BP: 167/66   BP Location: Left arm   Patient Position: Sitting   Cuff Size: Large Adult   Pulse: 64   Resp: 18   SpO2: 98%   Weight: 67.1 kg (148 lb)   Height: 157.5 cm (62\")       Physical Exam:    General: Alert, cooperative, no distress, appears stated age  Head:  Normocephalic, atraumatic, mucous membranes moist  Eyes:  Conjunctiva/corneas clear, EOM's intact     Neck:  Supple,  no bruit  Lungs: Clear to auscultation bilaterally, no wheezes rhonchi rales are noted  Chest wall: No tenderness  Heart::  Regular rate and rhythm, S1 and S2 normal, 1/6 holosystolic murmur.  No rub or gallop  Abdomen: Soft, non-tender, nondistended bowel sounds active  Extremities: No cyanosis, clubbing, or edema  Pulses: 2+ and symmetric all extremities  Skin:  No rashes or lesions  Neuro/psych: A&O x3. CN II through XII are grossly intact with appropriate affect      Allergies:  Allergies   Allergen Reactions   • Sulfa Antibiotics Unknown (See Comments) and Rash       Medication Review:     Current Outpatient Medications:   •  aspirin 81 MG tablet, Take  by mouth Daily., Disp: , Rfl:   •  escitalopram (LEXAPRO) 5 MG tablet, Take 5 mg by mouth Daily., Disp: , Rfl:   •  ezetimibe (ZETIA) 10 MG tablet, TAKE 1 TABLET BY MOUTH DAILY, Disp: 30 tablet, Rfl: 0  •  ezetimibe (ZETIA) 10 MG tablet, TAKE 1 TABLET BY MOUTH DAILY, Disp: 30 tablet, Rfl: 5  •  fluticasone (FLONASE) 50 MCG/ACT nasal spray, , Disp: , Rfl:   •  " losartan (COZAAR) 50 MG tablet, Daily. Take 1 1/2 tab daily, Disp: , Rfl:   •  metoprolol succinate XL (TOPROL-XL) 25 MG 24 hr tablet, Take 1 tablet by mouth Daily., Disp: 90 tablet, Rfl: 2  •  nitrofurantoin (MACRODANTIN) 50 MG capsule, Every Night., Disp: , Rfl: 4  •  nitroglycerin (NITROSTAT) 0.4 MG SL tablet, As Needed., Disp: , Rfl:   •  omeprazole (prilOSEC) 10 MG capsule, Take 10 mg by mouth Daily., Disp: , Rfl:   •  rosuvastatin (CRESTOR) 10 MG tablet, TAKE 1 TABLET BY MOUTH DAILY, Disp: 90 tablet, Rfl: 0  •  temazepam (RESTORIL) 15 MG capsule, Take 15 mg by mouth., Disp: , Rfl:     Family History:  Family History   Problem Relation Age of Onset   • Diabetes Mother        Past Medical History:  Past Medical History:   Diagnosis Date   • Atrial fibrillation (CMS/HCC)    • Coronary artery disease    • Hyperlipidemia    • Hypertension    • Palpitations        Past surgical History:  Past Surgical History:   Procedure Laterality Date   • CARDIAC CATHETERIZATION     • CORONARY ARTERY BYPASS GRAFT         Social History:  Social History     Socioeconomic History   • Marital status:      Spouse name: Not on file   • Number of children: Not on file   • Years of education: Not on file   • Highest education level: Not on file   Tobacco Use   • Smoking status: Former Smoker     Packs/day: 1.50     Types: Cigarettes     Last attempt to quit:      Years since quittin.6   • Smokeless tobacco: Never Used   Substance and Sexual Activity   • Alcohol use: Yes     Alcohol/week: 14.0 standard drinks     Types: 14 Glasses of wine per week     Comment: routinely    • Drug use: No       Review of Systems:  The following systems were reviewed as they relate to the cardiovascular system: Constitutional, Eyes, ENT, Cardiovascular, Respiratory, Gastrointestinal, Integumentary, Neurological, Psychiatric, Hematologic, Endocrine, Musculoskeletal, and Genitourinary. The pertinent cardiovascular findings are reported above  with all other cardiovascular points within those systems being negative.    Diagnostic Study Review:     Current Electrocardiogram:  Procedures no new EKG.  EKG dated 4/29/2020 demonstrates sinus rhythm with a ventricular rate of 57 bpm.  Right axis deviation.      NOTE: The following portions of the patient's history were reviewed and updated this visit as appropriate: allergies, current medications, past family history, past medical history, past social history, past surgical history and problem list.

## 2020-09-28 RX ORDER — ROSUVASTATIN CALCIUM 10 MG/1
10 TABLET, COATED ORAL DAILY
Qty: 90 TABLET | Refills: 0 | Status: SHIPPED | OUTPATIENT
Start: 2020-09-28 | End: 2020-12-29

## 2020-10-14 ENCOUNTER — OFFICE (AMBULATORY)
Dept: URBAN - METROPOLITAN AREA CLINIC 64 | Facility: CLINIC | Age: 80
End: 2020-10-14
Payer: COMMERCIAL

## 2020-10-14 VITALS
WEIGHT: 154 LBS | HEART RATE: 79 BPM | SYSTOLIC BLOOD PRESSURE: 151 MMHG | HEIGHT: 62 IN | DIASTOLIC BLOOD PRESSURE: 75 MMHG

## 2020-10-14 DIAGNOSIS — R93.3 ABNORMAL FINDINGS ON DIAGNOSTIC IMAGING OF OTHER PARTS OF DI: ICD-10-CM

## 2020-10-14 DIAGNOSIS — K21.9 GASTRO-ESOPHAGEAL REFLUX DISEASE WITHOUT ESOPHAGITIS: ICD-10-CM

## 2020-10-14 DIAGNOSIS — K31.7 POLYP OF STOMACH AND DUODENUM: ICD-10-CM

## 2020-10-14 PROCEDURE — 99214 OFFICE O/P EST MOD 30 MIN: CPT | Performed by: INTERNAL MEDICINE

## 2020-12-07 RX ORDER — EZETIMIBE 10 MG/1
10 TABLET ORAL DAILY
Qty: 90 TABLET | Refills: 2 | Status: SHIPPED | OUTPATIENT
Start: 2020-12-07 | End: 2021-06-09

## 2020-12-09 ENCOUNTER — OFFICE VISIT (OUTPATIENT)
Dept: CARDIOLOGY | Facility: CLINIC | Age: 80
End: 2020-12-09

## 2020-12-09 VITALS
RESPIRATION RATE: 18 BRPM | OXYGEN SATURATION: 100 % | DIASTOLIC BLOOD PRESSURE: 70 MMHG | SYSTOLIC BLOOD PRESSURE: 177 MMHG | HEIGHT: 62 IN | WEIGHT: 152 LBS | HEART RATE: 56 BPM | BODY MASS INDEX: 27.97 KG/M2

## 2020-12-09 DIAGNOSIS — I25.119 CORONARY ARTERY DISEASE WITH ANGINA PECTORIS, UNSPECIFIED VESSEL OR LESION TYPE, UNSPECIFIED WHETHER NATIVE OR TRANSPLANTED HEART (HCC): Primary | ICD-10-CM

## 2020-12-09 PROCEDURE — 99214 OFFICE O/P EST MOD 30 MIN: CPT | Performed by: INTERNAL MEDICINE

## 2020-12-09 PROCEDURE — 93000 ELECTROCARDIOGRAM COMPLETE: CPT | Performed by: INTERNAL MEDICINE

## 2020-12-09 RX ORDER — OMEPRAZOLE 20 MG/1
CAPSULE, DELAYED RELEASE ORAL
COMMUNITY
Start: 2020-10-26 | End: 2022-03-08

## 2020-12-09 NOTE — PROGRESS NOTES
Cardiology Office Visit      Encounter Date:  12/09/2020    Patient ID:   Elizabeth Whaley is a 80 y.o. female.    Reason For Followup:  Coronary artery disease  Hypertension  Hypertensive cardiovascular disease  Hyperlipidemia    Brief Clinical History:  Dear Dr. Wadsworth, Gerda Diamond MD    I had the pleasure of seeing Elizabeth Whaley today. As you are well aware, this is a 80 y.o. female with a history of coronary artery disease she is undergone coronary arterial bypass grafting in 2015.  She has additional history that includes hypertension, hypertensive cardiovascular disease, hyperlipidemia, and antiplatelet therapy.  She presents today for follow-up on the above conditions.    Interval History:  She denies any chest pain pressure heaviness or tightness.  She denies any shortness of breath.  She denies any PND orthopnea.  She denies any syncope or near syncope.  She reports feeling well from a cardiac perspective.    Her blood pressure is elevated today however she reports that her blood pressure is normally well controlled.  We reviewed her blood pressure logs today in detail.  They demonstrate excellent control.  She had a nuclear stress test in September 2019 that demonstrated no evidence of ischemia.    The patient reports feeling remarkably well.  She reports that she had a significant amount of anxiety regarding her previous automobile that has been alleviated by buying a new car.  She feels like this is made a big difference in her stress level.  Her previous car was a maintenance burden and she feels much safer in her new vehicle.    Assessment & Plan    Impressions:  Coronary artery disease status post two-vessel CABG in May 2015      SRINATH-RCA, LIMA-LAD  Hypertension  Hypertensive cardiovascular disease  Hyperlipidemia  Sweating episodes    Recommendations:  Continuation of her current cardiovascular regimen at the present time.  Follow-up in 6 months time sooner should there be  "difficulties.    Objective:    Vitals:  Vitals:    12/09/20 0844   BP: 177/70   BP Location: Left arm   Patient Position: Sitting   Cuff Size: Large Adult   Pulse: 56   Resp: 18   SpO2: 100%   Weight: 68.9 kg (152 lb)   Height: 157.5 cm (62\")       Physical Exam:    General: Alert, cooperative, no distress, appears stated age  Head:  Normocephalic, atraumatic, mucous membranes moist  Eyes:  Conjunctiva/corneas clear, EOM's intact     Neck:  Supple,  no bruit  Lungs: Clear to auscultation bilaterally, no wheezes rhonchi rales are noted  Chest wall: No tenderness  Heart::  Regular rate and rhythm, S1 and S2 normal, 1/6 holosystolic murmur.  No rub or gallop  Abdomen: Soft, non-tender, nondistended bowel sounds active  Extremities: No cyanosis, clubbing, or edema  Pulses: 2+ and symmetric all extremities  Skin:  No rashes or lesions  Neuro/psych: A&O x3. CN II through XII are grossly intact with appropriate affect      Allergies:  Allergies   Allergen Reactions   • Sulfa Antibiotics Unknown (See Comments) and Rash       Medication Review:     Current Outpatient Medications:   •  aspirin 81 MG tablet, Take  by mouth Daily., Disp: , Rfl:   •  escitalopram (LEXAPRO) 5 MG tablet, Take 5 mg by mouth Daily., Disp: , Rfl:   •  ezetimibe (ZETIA) 10 MG tablet, TAKE 1 TABLET BY MOUTH DAILY, Disp: 30 tablet, Rfl: 0  •  ezetimibe (ZETIA) 10 MG tablet, TAKE 1 TABLET BY MOUTH DAILY, Disp: 90 tablet, Rfl: 2  •  fluticasone (FLONASE) 50 MCG/ACT nasal spray, , Disp: , Rfl:   •  losartan (COZAAR) 50 MG tablet, Daily. Take 1 1/2 tab daily, Disp: , Rfl:   •  metoprolol succinate XL (TOPROL-XL) 25 MG 24 hr tablet, Take 1 tablet by mouth Daily., Disp: 90 tablet, Rfl: 2  •  nitrofurantoin (MACRODANTIN) 50 MG capsule, Every Night., Disp: , Rfl: 4  •  nitroglycerin (NITROSTAT) 0.4 MG SL tablet, As Needed., Disp: , Rfl:   •  omeprazole (prilOSEC) 10 MG capsule, Take 10 mg by mouth Daily., Disp: , Rfl:   •  omeprazole (priLOSEC) 20 MG capsule, " TK 1 C PO D, Disp: , Rfl:   •  rosuvastatin (CRESTOR) 10 MG tablet, TAKE 1 TABLET BY MOUTH DAILY, Disp: 90 tablet, Rfl: 0  •  temazepam (RESTORIL) 15 MG capsule, Take 15 mg by mouth., Disp: , Rfl:     Family History:  Family History   Problem Relation Age of Onset   • Diabetes Mother        Past Medical History:  Past Medical History:   Diagnosis Date   • Atrial fibrillation (CMS/HCC)    • Coronary artery disease    • Hyperlipidemia    • Hypertension    • Palpitations        Past surgical History:  Past Surgical History:   Procedure Laterality Date   • CARDIAC CATHETERIZATION     • CORONARY ARTERY BYPASS GRAFT         Social History:  Social History     Socioeconomic History   • Marital status:      Spouse name: Not on file   • Number of children: Not on file   • Years of education: Not on file   • Highest education level: Not on file   Tobacco Use   • Smoking status: Former Smoker     Packs/day: 1.50     Types: Cigarettes     Quit date:      Years since quittin.9   • Smokeless tobacco: Never Used   Substance and Sexual Activity   • Alcohol use: Yes     Alcohol/week: 14.0 standard drinks     Types: 14 Glasses of wine per week     Comment: routinely    • Drug use: No       Review of Systems:  The following systems were reviewed as they relate to the cardiovascular system: Constitutional, Eyes, ENT, Cardiovascular, Respiratory, Gastrointestinal, Integumentary, Neurological, Psychiatric, Hematologic, Endocrine, Musculoskeletal, and Genitourinary. The pertinent cardiovascular findings are reported above with all other cardiovascular points within those systems being negative.    Diagnostic Study Review:     Current Electrocardiogram:    ECG 12 Lead    Date/Time: 2020 12:34 PM  Performed by: Jagdeep Perez DO  Authorized by: Jagdeep Perez DO   Comparison: not compared with previous ECG   Previous ECG: no previous ECG available  Comments: Normal sinus rhythm with a  ventricular rate of 56 bpm.  Left atrial enlargement.  Probable old anterior MI.  Normal QT and QTc intervals.               NOTE: The following portions of the patient's history were reviewed and updated this visit as appropriate: allergies, current medications, past family history, past medical history, past social history, past surgical history and problem list.

## 2020-12-29 RX ORDER — ROSUVASTATIN CALCIUM 10 MG/1
10 TABLET, COATED ORAL DAILY
Qty: 90 TABLET | Refills: 0 | Status: SHIPPED | OUTPATIENT
Start: 2020-12-29 | End: 2021-03-29

## 2021-02-01 RX ORDER — METOPROLOL SUCCINATE 25 MG/1
25 TABLET, EXTENDED RELEASE ORAL DAILY
Qty: 90 TABLET | Refills: 2 | Status: SHIPPED | OUTPATIENT
Start: 2021-02-01 | End: 2021-11-18

## 2021-03-29 RX ORDER — ROSUVASTATIN CALCIUM 10 MG/1
10 TABLET, COATED ORAL DAILY
Qty: 90 TABLET | Refills: 3 | Status: SHIPPED | OUTPATIENT
Start: 2021-03-29 | End: 2022-03-07

## 2021-06-09 ENCOUNTER — OFFICE VISIT (OUTPATIENT)
Dept: CARDIOLOGY | Facility: CLINIC | Age: 81
End: 2021-06-09

## 2021-06-09 VITALS
DIASTOLIC BLOOD PRESSURE: 66 MMHG | HEIGHT: 62 IN | OXYGEN SATURATION: 97 % | SYSTOLIC BLOOD PRESSURE: 173 MMHG | WEIGHT: 156 LBS | RESPIRATION RATE: 18 BRPM | HEART RATE: 64 BPM | BODY MASS INDEX: 28.71 KG/M2

## 2021-06-09 DIAGNOSIS — Z95.1 S/P CABG (CORONARY ARTERY BYPASS GRAFT): ICD-10-CM

## 2021-06-09 DIAGNOSIS — Z79.02 LONG TERM (CURRENT) USE OF ANTITHROMBOTICS/ANTIPLATELETS: ICD-10-CM

## 2021-06-09 DIAGNOSIS — E78.2 MIXED HYPERLIPIDEMIA: ICD-10-CM

## 2021-06-09 DIAGNOSIS — I44.0 FIRST DEGREE AV BLOCK: ICD-10-CM

## 2021-06-09 DIAGNOSIS — I10 ESSENTIAL HYPERTENSION: ICD-10-CM

## 2021-06-09 DIAGNOSIS — I25.119 CORONARY ARTERY DISEASE WITH ANGINA PECTORIS, UNSPECIFIED VESSEL OR LESION TYPE, UNSPECIFIED WHETHER NATIVE OR TRANSPLANTED HEART (HCC): Primary | ICD-10-CM

## 2021-06-09 PROCEDURE — 99214 OFFICE O/P EST MOD 30 MIN: CPT | Performed by: INTERNAL MEDICINE

## 2021-06-09 PROCEDURE — 93000 ELECTROCARDIOGRAM COMPLETE: CPT | Performed by: INTERNAL MEDICINE

## 2021-06-09 RX ORDER — MELOXICAM 15 MG/1
15 TABLET ORAL DAILY
COMMUNITY
Start: 2021-05-04 | End: 2022-01-20

## 2021-06-09 NOTE — PROGRESS NOTES
Cardiology Office Visit      Encounter Date:  06/09/2021    Patient ID:   Elizabeth Whaley is a 80 y.o. female.    Reason For Followup:  Coronary artery disease  Hypertension  Hypertensive cardiovascular disease  Hyperlipidemia    Brief Clinical History:  Dear Dr. Wadsworth, Gerda Diamond MD    I had the pleasure of seeing Elizabeth Whaley today. As you are well aware, this is a 80 y.o. female with a history of coronary artery disease she is undergone coronary arterial bypass grafting in 2015.  She has additional history that includes hypertension, hypertensive cardiovascular disease, hyperlipidemia, and antiplatelet therapy.  She presents today for follow-up on the above conditions.    Interval History:  She denies any chest pain pressure heaviness or tightness.  She denies any shortness of breath.  She denies any PND orthopnea.  She denies any syncope or near syncope.  Other than some occasional hot flashes, she reports feeling well from a cardiac perspective.    Her blood pressure is elevated today however after reviewing her home blood pressure logs, I am satisfied of adequate control.  She does have a situational component to her hypertension.      Her bypass surgery was 6 years ago.  Although she has no overt symptoms of myocardial ischemia, the age of her grafts dictate that routine surveillance should be performed to assess for silent ischemia.  Her most recent nuclear stress test from September 2019 demonstrated no evidence of ischemia.    Assessment & Plan    Impressions:  Coronary artery disease status post two-vessel CABG in May 2015      SRINATH-RCA, LIMA-LAD  Hypertension  Hypertensive cardiovascular disease  Hyperlipidemia  Sweating episodes    Recommendations:  Continuation of her current cardiovascular regimen at the present time.  Screening stress test prior to nex visit  Obtain labs from your office  Follow-up in 6 months time sooner should there be difficulties.    Objective:    Vitals:  Vitals:    06/09/21  "0843   BP: 173/66   BP Location: Left arm   Patient Position: Sitting   Cuff Size: Large Adult   Pulse: 64   Resp: 18   SpO2: 97%   Weight: 70.8 kg (156 lb)   Height: 157.5 cm (62\")       Physical Exam:    General: Alert, cooperative, no distress, appears stated age  Head:  Normocephalic, atraumatic, mucous membranes moist  Eyes:  Conjunctiva/corneas clear, EOM's intact     Neck:  Supple,  no bruit  Lungs: Clear to auscultation bilaterally, no wheezes rhonchi rales are noted  Chest wall: No tenderness  Heart::  Regular rate and rhythm, S1 and S2 normal, 1/6 holosystolic murmur.  No rub or gallop  Abdomen: Soft, non-tender, nondistended bowel sounds active  Extremities: No cyanosis, clubbing, or edema  Pulses: 2+ and symmetric all extremities  Skin:  No rashes or lesions  Neuro/psych: A&O x3. CN II through XII are grossly intact with appropriate affect      Allergies:  Allergies   Allergen Reactions   • Sulfa Antibiotics Unknown (See Comments) and Rash       Medication Review:     Current Outpatient Medications:   •  aspirin 81 MG tablet, Take  by mouth Daily., Disp: , Rfl:   •  escitalopram (LEXAPRO) 5 MG tablet, Take 5 mg by mouth Daily., Disp: , Rfl:   •  ezetimibe (ZETIA) 10 MG tablet, TAKE 1 TABLET BY MOUTH DAILY, Disp: 30 tablet, Rfl: 0  •  losartan (COZAAR) 50 MG tablet, Daily. Take 1 1/2 tab daily, Disp: , Rfl:   •  meloxicam (MOBIC) 15 MG tablet, Take 15 mg by mouth Daily., Disp: , Rfl:   •  metoprolol succinate XL (TOPROL-XL) 25 MG 24 hr tablet, TAKE 1 TABLET BY MOUTH DAILY, Disp: 90 tablet, Rfl: 2  •  nitrofurantoin (MACRODANTIN) 50 MG capsule, Every Night., Disp: , Rfl: 4  •  nitroglycerin (NITROSTAT) 0.4 MG SL tablet, As Needed., Disp: , Rfl:   •  omeprazole (priLOSEC) 20 MG capsule, TK 1 C PO D, Disp: , Rfl:   •  rosuvastatin (CRESTOR) 10 MG tablet, TAKE 1 TABLET BY MOUTH DAILY, Disp: 90 tablet, Rfl: 3  •  temazepam (RESTORIL) 15 MG capsule, Take 15 mg by mouth., Disp: , Rfl:     Family History:  Family " History   Problem Relation Age of Onset   • Diabetes Mother        Past Medical History:  Past Medical History:   Diagnosis Date   • Atrial fibrillation (CMS/HCC)    • Coronary artery disease    • Hyperlipidemia    • Hypertension    • Palpitations        Past surgical History:  Past Surgical History:   Procedure Laterality Date   • CARDIAC CATHETERIZATION     • CORONARY ARTERY BYPASS GRAFT         Social History:  Social History     Socioeconomic History   • Marital status:      Spouse name: Not on file   • Number of children: Not on file   • Years of education: Not on file   • Highest education level: Not on file   Tobacco Use   • Smoking status: Former Smoker     Packs/day: 1.50     Types: Cigarettes     Quit date:      Years since quittin.4   • Smokeless tobacco: Never Used   Vaping Use   • Vaping Use: Never used   Substance and Sexual Activity   • Alcohol use: Yes     Alcohol/week: 14.0 standard drinks     Types: 14 Glasses of wine per week     Comment: routinely    • Drug use: No       Review of Systems:  The following systems were reviewed as they relate to the cardiovascular system: Constitutional, Eyes, ENT, Cardiovascular, Respiratory, Gastrointestinal, Integumentary, Neurological, Psychiatric, Hematologic, Endocrine, Musculoskeletal, and Genitourinary. The pertinent cardiovascular findings are reported above with all other cardiovascular points within those systems being negative.    Diagnostic Study Review:     Current Electrocardiogram:    ECG 12 Lead    Date/Time: 2021 10:03 AM  Performed by: Jagdeep Perez DO  Authorized by: Jagdeep Perez DO   Comparison: not compared with previous ECG   Previous ECG: no previous ECG available  Comments: Normal sinus rhythm with a ventricular rate of 60 bpm.  Consider left atrial enlargement.  Right bundle branch block.  First-degree AV block.  LVH.  Normal QT and QTc intervals.               NOTE: The following portions  of the patient's note were reviewed, confirmed and/or updated this visit as appropriate: History of present illness/Interval history, physical examination, assessment & plan, allergies, current medications, past family history, past medical history, past social history, past surgical history and problem list.

## 2021-06-09 NOTE — PATIENT INSTRUCTIONS
Follow-up in 6 months  Lexiscan stress nuclear prior to next visit  Get labs from [facility] office

## 2021-06-10 PROBLEM — I44.0 FIRST DEGREE AV BLOCK: Status: ACTIVE | Noted: 2021-06-10

## 2021-07-20 ENCOUNTER — HOSPITAL ENCOUNTER (OUTPATIENT)
Dept: CARDIOLOGY | Facility: HOSPITAL | Age: 81
Discharge: HOME | End: 2021-07-20

## 2021-07-20 DIAGNOSIS — I25.119 CORONARY ARTERY DISEASE WITH ANGINA PECTORIS, UNSPECIFIED VESSEL OR LESION TYPE, UNSPECIFIED WHETHER NATIVE OR TRANSPLANTED HEART (HCC): ICD-10-CM

## 2021-07-20 PROCEDURE — 25010000002 REGADENOSON 0.4 MG/5ML SOLUTION: Performed by: INTERNAL MEDICINE

## 2021-07-20 PROCEDURE — 0 TECHNETIUM SESTAMIBI: Performed by: INTERNAL MEDICINE

## 2021-07-20 PROCEDURE — 78452 HT MUSCLE IMAGE SPECT MULT: CPT | Performed by: INTERNAL MEDICINE

## 2021-07-20 PROCEDURE — 78452 HT MUSCLE IMAGE SPECT MULT: CPT

## 2021-07-20 PROCEDURE — A9500 TC99M SESTAMIBI: HCPCS | Performed by: INTERNAL MEDICINE

## 2021-07-20 PROCEDURE — 93017 CV STRESS TEST TRACING ONLY: CPT

## 2021-07-20 PROCEDURE — 93016 CV STRESS TEST SUPVJ ONLY: CPT | Performed by: INTERNAL MEDICINE

## 2021-07-20 PROCEDURE — 93018 CV STRESS TEST I&R ONLY: CPT | Performed by: INTERNAL MEDICINE

## 2021-07-20 RX ADMIN — REGADENOSON 0.4 MG: 0.08 INJECTION, SOLUTION INTRAVENOUS at 09:20

## 2021-07-20 RX ADMIN — TECHNETIUM TC 99M SESTAMIBI 1 DOSE: 1 INJECTION INTRAVENOUS at 07:55

## 2021-07-20 RX ADMIN — TECHNETIUM TC 99M SESTAMIBI 1 DOSE: 1 INJECTION INTRAVENOUS at 09:20

## 2021-07-21 LAB
BH CV REST NUCLEAR ISOTOPE DOSE: 8.6 MCI
BH CV STRESS BP STAGE 1: NORMAL
BH CV STRESS COMMENTS STAGE 1: NORMAL
BH CV STRESS DOSE REGADENOSON STAGE 1: 0.4
BH CV STRESS DURATION MIN STAGE 1: 0
BH CV STRESS DURATION SEC STAGE 1: 10
BH CV STRESS HR STAGE 1: 92
BH CV STRESS NUCLEAR ISOTOPE DOSE: 33.7 MCI
BH CV STRESS PROTOCOL 1: NORMAL
BH CV STRESS RECOVERY BP: NORMAL MMHG
BH CV STRESS RECOVERY HR: 76 BPM
BH CV STRESS STAGE 1: 1
LV EF NUC BP: 72 %
MAXIMAL PREDICTED HEART RATE: 141 BPM
PERCENT MAX PREDICTED HR: 65.25 %
STRESS BASELINE BP: NORMAL MMHG
STRESS BASELINE HR: 60 BPM
STRESS PERCENT HR: 77 %
STRESS POST PEAK BP: NORMAL MMHG
STRESS POST PEAK HR: 92 BPM
STRESS TARGET HR: 120 BPM

## 2021-09-07 RX ORDER — EZETIMIBE 10 MG/1
10 TABLET ORAL DAILY
Qty: 90 TABLET | Refills: 1 | Status: SHIPPED | OUTPATIENT
Start: 2021-09-07 | End: 2022-03-07

## 2021-11-18 RX ORDER — METOPROLOL SUCCINATE 25 MG/1
25 TABLET, EXTENDED RELEASE ORAL DAILY
Qty: 90 TABLET | Refills: 2 | Status: SHIPPED | OUTPATIENT
Start: 2021-11-18 | End: 2022-08-15

## 2022-01-19 PROBLEM — R35.0 INCREASED FREQUENCY OF URINATION: Status: ACTIVE | Noted: 2018-05-14

## 2022-01-19 PROBLEM — R30.0 DIFFICULT OR PAINFUL URINATION: Status: ACTIVE | Noted: 2018-05-14

## 2022-01-19 PROBLEM — R91.8 LUNG NODULES: Status: ACTIVE | Noted: 2020-01-22

## 2022-01-19 PROBLEM — I71.30 RUPTURED ABDOMINAL AORTIC ANEURYSM (HCC): Status: ACTIVE | Noted: 2020-01-22

## 2022-01-19 PROBLEM — R39.15 URINARY URGENCY: Status: ACTIVE | Noted: 2018-05-14

## 2022-01-19 PROBLEM — N63.10 MASS OF BREAST, RIGHT: Status: ACTIVE | Noted: 2020-01-22

## 2022-01-19 PROBLEM — E78.1 HYPERTRIGLYCERIDEMIA: Status: ACTIVE | Noted: 2019-05-28

## 2022-01-19 PROBLEM — R59.1 LYMPHADENOPATHY: Status: ACTIVE | Noted: 2018-04-11

## 2022-01-20 ENCOUNTER — OFFICE VISIT (OUTPATIENT)
Dept: CARDIOLOGY | Facility: CLINIC | Age: 82
End: 2022-01-20

## 2022-01-20 VITALS
BODY MASS INDEX: 28.16 KG/M2 | HEIGHT: 62 IN | SYSTOLIC BLOOD PRESSURE: 168 MMHG | HEART RATE: 60 BPM | WEIGHT: 153 LBS | DIASTOLIC BLOOD PRESSURE: 72 MMHG

## 2022-01-20 DIAGNOSIS — I10 ESSENTIAL HYPERTENSION: ICD-10-CM

## 2022-01-20 DIAGNOSIS — I25.119 CORONARY ARTERY DISEASE WITH ANGINA PECTORIS, UNSPECIFIED VESSEL OR LESION TYPE, UNSPECIFIED WHETHER NATIVE OR TRANSPLANTED HEART: Primary | ICD-10-CM

## 2022-01-20 DIAGNOSIS — E78.2 MIXED HYPERLIPIDEMIA: ICD-10-CM

## 2022-01-20 DIAGNOSIS — I25.10 ARTERIOSCLEROSIS OF CORONARY ARTERY: ICD-10-CM

## 2022-01-20 PROCEDURE — 99214 OFFICE O/P EST MOD 30 MIN: CPT | Performed by: INTERNAL MEDICINE

## 2022-01-20 NOTE — PROGRESS NOTES
Cardiology Office Visit      Encounter Date:  01/20/2022    Patient ID:   Elizabeth Whaley is a 81 y.o. female.    Reason For Followup:  Coronary artery disease  Hypertension  Hypertensive cardiovascular disease  Hyperlipidemia  Antiplatelet therapy    Brief Clinical History:  Dear Dr. Wadsworth, Gerda Diamond MD    I had the pleasure of seeing Elizabeth Whaley today. As you are well aware, this is a 81 y.o. female with a history of coronary artery disease she is undergone coronary arterial bypass grafting in 2015.  She has additional history that includes hypertension, hypertensive cardiovascular disease, hyperlipidemia, and antiplatelet therapy.  She presents today for follow-up on the above conditions.    Interval History:  She denies any chest pain pressure heaviness or tightness.  She denies any shortness of breath.  She denies any PND orthopnea.  She denies any syncope or near syncope.  Other than some occasional hot flashes, she reports feeling well from a cardiac perspective.    Her blood pressure is elevated today however her home readings as per her logs are better.  As you will recall, she has a situational component to her hypertension.    Because of the age of her grafts, she underwent screening stress testing in July 2021.  No evidence of ischemia was identified.    She continues to be active and still works at [employer].  She does report some intermittent pain in her right groin from time to time.    We were able to review her most recent laboratory data from your office.  She pulled this up on her phone.  Total cholesterol 170, triglycerides 120, HDL 52, LDL 97, glucose 94.    Assessment & Plan    Impressions:  Coronary artery disease status post two-vessel CABG in May 2015      SRINATH-RCA, LIMA-LAD  Hypertension  Hypertensive cardiovascular disease  Hyperlipidemia  Sweating episodes    Recommendations:  Continuation of her current cardiovascular regimen at the present time.     This includes antiplatelet therapy,  "antihypertensives, short acting nitrates, and statin therapy.  Periodic routine surveillance laboratory testing  Consider vascular screening bundle  Follow-up in 6 months time sooner should there be difficulties.    Diagnoses and all orders for this visit:    1. Coronary artery disease with angina pectoris, unspecified vessel or lesion type, unspecified whether native or transplanted heart (HCC) (Primary)  -     ECG 12 Lead    2. Arteriosclerosis of coronary artery  -     ECG 12 Lead    3. Essential hypertension  -     ECG 12 Lead    4. Mixed hyperlipidemia          Objective:    Vitals:  Vitals:    01/20/22 0954   BP: 168/72   Pulse: 60   Weight: 69.4 kg (153 lb)   Height: 157.5 cm (62\")     Body mass index is 27.98 kg/m².      Physical Exam:    General: Alert, cooperative, no distress, appears stated age  Head:  Normocephalic, atraumatic, mucous membranes moist  Eyes:  Conjunctiva/corneas clear, EOM's intact     Neck:  Supple,  no bruit    Lungs: Clear to auscultation bilaterally, no wheezes rhonchi rales are noted  Chest wall: No tenderness  Heart::  Regular rate and rhythm, S1 and S2 normal, 1/6 holosystolic murmur.  No, rub or gallop  Abdomen: Soft, non-tender, nondistended bowel sounds active  Extremities: No cyanosis, clubbing, or edema  Pulses: 2+ and symmetric all extremities  Skin:  No rashes or lesions  Neuro/psych: A&O x3. CN II through XII are grossly intact with appropriate affect      Allergies:  Allergies   Allergen Reactions   • Sulfa Antibiotics Unknown (See Comments) and Rash       Medication Review:     Current Outpatient Medications:   •  aspirin 81 MG tablet, Take  by mouth Daily., Disp: , Rfl:   •  escitalopram (LEXAPRO) 5 MG tablet, Take 5 mg by mouth Daily., Disp: , Rfl:   •  ezetimibe (ZETIA) 10 MG tablet, TAKE 1 TABLET BY MOUTH DAILY, Disp: 90 tablet, Rfl: 1  •  losartan (COZAAR) 50 MG tablet, Daily. Take 1 1/2 tab daily, Disp: , Rfl:   •  metoprolol succinate XL (TOPROL-XL) 25 MG 24 hr " tablet, TAKE 1 TABLET BY MOUTH DAILY, Disp: 90 tablet, Rfl: 2  •  nitrofurantoin (MACRODANTIN) 50 MG capsule, Every Night. Daily for UTI prevention, Disp: , Rfl: 4  •  nitroglycerin (NITROSTAT) 0.4 MG SL tablet, As Needed., Disp: , Rfl:   •  omeprazole (priLOSEC) 20 MG capsule, TK 1 C PO D, Disp: , Rfl:   •  rosuvastatin (CRESTOR) 10 MG tablet, TAKE 1 TABLET BY MOUTH DAILY, Disp: 90 tablet, Rfl: 3  •  temazepam (RESTORIL) 15 MG capsule, Take 15 mg by mouth., Disp: , Rfl:   •  vitamin D3 125 MCG (5000 UT) capsule capsule, Take 5,000 Units by mouth Daily., Disp: , Rfl:     Family History:  Family History   Problem Relation Age of Onset   • Diabetes Mother        Past Medical History:  Past Medical History:   Diagnosis Date   • Atrial fibrillation (HCC)    • Coronary artery disease    • Hyperlipidemia    • Hypertension    • Palpitations        Past Surgical History:  Past Surgical History:   Procedure Laterality Date   • CARDIAC CATHETERIZATION     • CORONARY ARTERY BYPASS GRAFT         Social History:  Social History     Socioeconomic History   • Marital status:    Tobacco Use   • Smoking status: Former Smoker     Packs/day: 1.50     Types: Cigarettes     Quit date:      Years since quittin.0   • Smokeless tobacco: Never Used   Vaping Use   • Vaping Use: Never used   Substance and Sexual Activity   • Alcohol use: Yes     Alcohol/week: 14.0 standard drinks     Types: 14 Glasses of wine per week     Comment: routinely    • Drug use: No   • Sexual activity: Defer       Review of Systems:  The following systems were reviewed as they relate to the cardiovascular system: Constitutional, Eyes, ENT, Cardiovascular, Respiratory, Gastrointestinal, Integumentary, Neurological, Psychiatric, Hematologic, Endocrine, Musculoskeletal, and Genitourinary. The pertinent cardiovascular findings are reported above with all other cardiovascular points within those systems being negative.    Diagnostic Study Review:      Current Electrocardiogram:    ECG 12 Lead    Date/Time: 1/21/2022 6:56 PM  Performed by: Jagdeep Perez DO  Authorized by: Jagdeep Perez DO   Comparison: not compared with previous ECG   Previous ECG: no previous ECG available  Comments: Normal sinus rhythm with a ventricular rate of 56 bpm.  Left atrial enlargement.  First-degree AV block.  Normal QT and QTc intervals.  Normal QRS axis.            Laboratory Data:  Lab Results   Component Value Date    BUN 17 01/23/2020    CREATININE 0.5 (L) 01/23/2020    BCR 34.7 01/23/2020    K 4.3 01/23/2020    CO2 28 01/23/2020    CALCIUM 8.8 01/23/2020    ALBUMIN 4.0 01/23/2020    LABIL2 1.3 01/23/2020    AST 30 01/23/2020    ALT 26 01/23/2020     Lab Results   Component Value Date    CALCIUM 8.8 01/23/2020     01/23/2020    K 4.3 01/23/2020    CO2 28 01/23/2020     01/23/2020    BUN 17 01/23/2020    CREATININE 0.5 (L) 01/23/2020    BCR 34.7 01/23/2020     Lab Results   Component Value Date    WBC 3.74 (L) 01/23/2020    HGB 14.4 01/23/2020    HCT 44.5 01/23/2020    MCV 88.1 01/23/2020     (L) 01/23/2020     No results found for: CHOL, CHLPL, TRIG, HDL, LDL, LDLDIRECT  No results found for: HGBA1C  No results found for: INR, PROTIME    Most Recent Echo:       Most Recent Stress Test:  Results for orders placed during the hospital encounter of 07/20/21    Stress Test With Myocardial Perfusion One Day    Interpretation Summary  · Myocardial perfusion imaging indicates a normal myocardial perfusion study with no evidence of ischemia.  · Fixed apical defect likely secondary to technical factors given normal wall motion in this area  · Left ventricular ejection fraction is hyperdynamic (Calculated EF > 70%). .  · Findings consistent with a normal ECG stress test.  · Impressions are consistent with a low risk study.  · There is no prior study available for comparison.       Most Recent Cardiac Catheterization:   No results found for  this or any previous visit.       NOTE: The following portions of the patient's note were reviewed, confirmed and/or updated this visit as appropriate: History of present illness/Interval history, physical examination, assessment & plan, allergies, current medications, past family history, past medical history, past social history, past surgical history and problem list.

## 2022-01-21 PROCEDURE — 93000 ELECTROCARDIOGRAM COMPLETE: CPT | Performed by: INTERNAL MEDICINE

## 2022-01-27 ENCOUNTER — APPOINTMENT (OUTPATIENT)
Dept: CARDIOLOGY | Facility: HOSPITAL | Age: 82
End: 2022-01-27

## 2022-02-08 ENCOUNTER — HOSPITAL ENCOUNTER (OUTPATIENT)
Dept: CARDIOLOGY | Facility: HOSPITAL | Age: 82
Discharge: HOME | End: 2022-02-08
Admitting: INTERNAL MEDICINE

## 2022-02-08 VITALS — BODY MASS INDEX: 28.16 KG/M2 | HEIGHT: 62 IN | WEIGHT: 153 LBS

## 2022-02-08 DIAGNOSIS — Z13.6 SCREENING FOR ISCHEMIC HEART DISEASE: ICD-10-CM

## 2022-02-08 LAB
BH CV ECHO MEAS - DIST AO DIAM: 1.81 CM
BH CV VAS BP LEFT ARM: NORMAL MMHG
BH CV VAS BP RIGHT ARM: NORMAL MMHG
BH CV XLRA MEAS - MID AO DIAM: 2.82 CM
BH CV XLRA MEAS - PAD LEFT ABI DP: 1.1
BH CV XLRA MEAS - PAD LEFT ARM: 143 MMHG
BH CV XLRA MEAS - PAD LEFT LEG DP: 181 MMHG
BH CV XLRA MEAS - PAD RIGHT ABI DP: 1.1
BH CV XLRA MEAS - PAD RIGHT ARM: 164 MMHG
BH CV XLRA MEAS - PAD RIGHT LEG DP: 181 MMHG
BH CV XLRA MEAS - PROX AO DIAM: 1.65 CM
BH CV XLRA MEAS LEFT DIST CCA EDV: 11.8 CM/SEC
BH CV XLRA MEAS LEFT DIST CCA PSV: 140.5 CM/SEC
BH CV XLRA MEAS LEFT ICA/CCA RATIO: 2.59
BH CV XLRA MEAS LEFT MID CCA PSV: 140.5 CM/SEC
BH CV XLRA MEAS LEFT MID ICA PSV: 363.6 CM/SEC
BH CV XLRA MEAS LEFT PROX ECA PSV: 188.4 CM/SEC
BH CV XLRA MEAS LEFT PROX ICA EDV: 48.6 CM/SEC
BH CV XLRA MEAS LEFT PROX ICA PSV: 363.6 CM/SEC
BH CV XLRA MEAS RIGHT DIST CCA EDV: 9.8 CM/SEC
BH CV XLRA MEAS RIGHT DIST CCA PSV: 86.4 CM/SEC
BH CV XLRA MEAS RIGHT ICA/CCA RATIO: 2.27
BH CV XLRA MEAS RIGHT MID CCA PSV: 86.4 CM/SEC
BH CV XLRA MEAS RIGHT MID ICA PSV: 196 CM/SEC
BH CV XLRA MEAS RIGHT PROX ECA PSV: 95.3 CM/SEC
BH CV XLRA MEAS RIGHT PROX ICA EDV: 31.1 CM/SEC
BH CV XLRA MEAS RIGHT PROX ICA PSV: 196 CM/SEC

## 2022-02-08 PROCEDURE — 93799 UNLISTED CV SVC/PROCEDURE: CPT

## 2022-02-17 ENCOUNTER — TELEPHONE (OUTPATIENT)
Dept: CARDIOLOGY | Facility: CLINIC | Age: 82
End: 2022-02-17

## 2022-02-17 NOTE — TELEPHONE ENCOUNTER
Patient calling for results of her vascular screening done last week. Her next appt with you is not until July   67

## 2022-02-21 ENCOUNTER — TELEPHONE (OUTPATIENT)
Dept: CARDIOLOGY | Facility: CLINIC | Age: 82
End: 2022-02-21

## 2022-02-21 DIAGNOSIS — I65.23 INTERNAL CAROTID ARTERY STENOSIS, BILATERAL: Primary | ICD-10-CM

## 2022-02-21 NOTE — TELEPHONE ENCOUNTER
Moon looked at the report and entered a referral to vascular. I called the patient back to let her know and she said she will not go see a vascular surgeon unless you feel it is necessary. She is aware that it will be next week before I get an answer.

## 2022-02-21 NOTE — TELEPHONE ENCOUNTER
I called the patient to let her know that Moon  entered a referral to vascular, she does not want to go to Vascular unless Dr Perez has reviewed the studies and feels it is necessary. She was informed that Dr Perez is out of the office rounding at the hospital until next week and she stated that was fine. I will postpone the referral until I hear from Dr Perez

## 2022-02-21 NOTE — TELEPHONE ENCOUNTER
Patient called last week for her vascular report but I am not sure how to interpret the report- would you look at it for me and let me know what I need to tell her or if she just needs to make an appt to see Dr Perez or a vascular referral?

## 2022-02-22 NOTE — TELEPHONE ENCOUNTER
I spoke with the patient, she is concerned about going to any location not in [place]. I advised her to let them know when they call her.

## 2022-03-04 ENCOUNTER — OFFICE VISIT (OUTPATIENT)
Dept: CARDIOLOGY | Facility: CLINIC | Age: 82
End: 2022-03-04

## 2022-03-04 VITALS
BODY MASS INDEX: 28.52 KG/M2 | HEIGHT: 62 IN | HEART RATE: 67 BPM | WEIGHT: 155 LBS | OXYGEN SATURATION: 91 % | DIASTOLIC BLOOD PRESSURE: 52 MMHG | SYSTOLIC BLOOD PRESSURE: 138 MMHG

## 2022-03-04 DIAGNOSIS — Z95.1 STATUS POST CORONARY ARTERY BYPASS GRAFT: ICD-10-CM

## 2022-03-04 DIAGNOSIS — Z79.02 LONG TERM (CURRENT) USE OF ANTITHROMBOTICS/ANTIPLATELETS: ICD-10-CM

## 2022-03-04 DIAGNOSIS — I25.119 CORONARY ARTERY DISEASE WITH ANGINA PECTORIS, UNSPECIFIED VESSEL OR LESION TYPE, UNSPECIFIED WHETHER NATIVE OR TRANSPLANTED HEART: Primary | ICD-10-CM

## 2022-03-04 DIAGNOSIS — I10 ESSENTIAL HYPERTENSION: ICD-10-CM

## 2022-03-04 DIAGNOSIS — I65.23 BILATERAL CAROTID ARTERY STENOSIS: ICD-10-CM

## 2022-03-04 DIAGNOSIS — E78.2 MIXED HYPERLIPIDEMIA: ICD-10-CM

## 2022-03-04 PROCEDURE — 99214 OFFICE O/P EST MOD 30 MIN: CPT | Performed by: INTERNAL MEDICINE

## 2022-03-04 RX ORDER — CLOPIDOGREL BISULFATE 75 MG/1
75 TABLET ORAL DAILY
Qty: 90 TABLET | Refills: 3 | Status: ON HOLD | OUTPATIENT
Start: 2022-03-04 | End: 2022-04-14

## 2022-03-04 NOTE — PROGRESS NOTES
Cardiology Office Visit      Encounter Date:  03/04/2022    Patient ID:   Elizabeth Whaley is a 81 y.o. female.    Reason For Followup:  Coronary artery disease  Hypertension  Hypertensive cardiovascular disease  Hyperlipidemia  Antiplatelet therapy    Brief Clinical History:  Dear Dr. Wadsworth, Gerda Diamond MD    I had the pleasure of seeing Elizabeth Whaley today. As you are well aware, this is a 81 y.o. female with a history of coronary artery disease she is undergone coronary arterial bypass grafting in 2015.  She has additional history that includes hypertension, hypertensive cardiovascular disease, hyperlipidemia, and antiplatelet therapy.  She presents today for follow-up on the above conditions.    Interval History:  She denies any chest pain pressure heaviness or tightness.  She denies any shortness of breath.  She denies any PND orthopnea.  She denies any syncope or near syncope.  Other than some occasional hot flashes, she reports feeling well from a cardiac perspective.    She underwent a vascular screening exam after her last visit.  It was demonstrable of significant carotid disease left greater than right.  Vascular surgery referral was recommended.    She was reluctant to proceed with vascular surgery referral without first discussing the findings with me.  We reviewed the results today in great detail.  We have placed a referral to vascular surgery.    Assessment & Plan    Impressions:  Coronary artery disease status post two-vessel CABG in May 2015      SRINATH-RCA, LIMA-LAD  Hypertension  Hypertensive cardiovascular disease  Hyperlipidemia  Sweating episodes    Recommendations:  Continuation of her current cardiovascular regimen at the present time.     This includes antiplatelet therapy, antihypertensives, short acting nitrates, and statin therapy.  Add clopidogrel  Vascular surgery referral  Follow-up in 3 months time    Diagnoses and all orders for this visit:    1. Coronary artery disease with angina  "pectoris, unspecified vessel or lesion type, unspecified whether native or transplanted heart (HCC) (Primary)    2. Status post coronary artery bypass graft    3. Essential hypertension    4. Mixed hyperlipidemia    5. Long term (current) use of antithrombotics/antiplatelets    6. Bilateral carotid artery stenosis    Other orders  -     clopidogrel (PLAVIX) 75 MG tablet; Take 1 tablet by mouth Daily.  Dispense: 90 tablet; Refill: 3          Objective:    Vitals:  Vitals:    03/04/22 1046   BP: 138/52   Pulse: 67   SpO2: 91%   Weight: 70.3 kg (155 lb)   Height: 157.5 cm (62\")     Body mass index is 28.35 kg/m².      Physical Exam:    General: Alert, cooperative, no distress, appears stated age  Head:  Normocephalic, atraumatic, mucous membranes moist  Eyes:  Conjunctiva/corneas clear, EOM's intact     Neck:  Supple, bilateral carotid bruits  Lungs: Clear to auscultation bilaterally, no wheezes rhonchi rales are noted  Chest wall: No tenderness  Heart::  Regular rate and rhythm, S1 and S2 normal, 1/6 holosystolic murmur.  No, rub or gallop  Abdomen: Soft, non-tender, nondistended bowel sounds active  Extremities: No cyanosis, clubbing, or edema  Pulses: 2+ and symmetric all extremities  Skin:  No rashes or lesions  Neuro/psych: A&O x3. CN II through XII are grossly intact with appropriate affect      Allergies:  Allergies   Allergen Reactions   • Codeine Unknown - Low Severity     Pt Cannot remember   • Levaquin [Levofloxacin] GI Intolerance   • Penicillins Rash   • Sulfa Antibiotics Unknown (See Comments) and Rash       Medication Review:     Current Outpatient Medications:   •  aspirin 81 MG tablet, Take  by mouth Daily., Disp: , Rfl:   •  escitalopram (LEXAPRO) 5 MG tablet, Take 5 mg by mouth Daily., Disp: , Rfl:   •  ezetimibe (ZETIA) 10 MG tablet, TAKE 1 TABLET BY MOUTH DAILY, Disp: 90 tablet, Rfl: 1  •  losartan (COZAAR) 50 MG tablet, Daily. Take 1 1/2 tab daily, Disp: , Rfl:   •  metoprolol succinate XL " (TOPROL-XL) 25 MG 24 hr tablet, TAKE 1 TABLET BY MOUTH DAILY, Disp: 90 tablet, Rfl: 2  •  nitrofurantoin (MACRODANTIN) 50 MG capsule, Every Night. Daily for UTI prevention, Disp: , Rfl: 4  •  nitroglycerin (NITROSTAT) 0.4 MG SL tablet, As Needed., Disp: , Rfl:   •  omeprazole (priLOSEC) 20 MG capsule, TK 1 C PO D, Disp: , Rfl:   •  rosuvastatin (CRESTOR) 10 MG tablet, TAKE 1 TABLET BY MOUTH DAILY, Disp: 90 tablet, Rfl: 3  •  temazepam (RESTORIL) 15 MG capsule, Take 15 mg by mouth., Disp: , Rfl:   •  vitamin D3 125 MCG (5000 UT) capsule capsule, Take 5,000 Units by mouth Daily., Disp: , Rfl:   •  clopidogrel (PLAVIX) 75 MG tablet, Take 1 tablet by mouth Daily., Disp: 90 tablet, Rfl: 3    Family History:  Family History   Problem Relation Age of Onset   • Diabetes Mother        Past Medical History:  Past Medical History:   Diagnosis Date   • Atrial fibrillation (HCC)    • Coronary artery disease    • Hyperlipidemia    • Hypertension    • Palpitations        Past Surgical History:  Past Surgical History:   Procedure Laterality Date   • CARDIAC CATHETERIZATION     • CORONARY ARTERY BYPASS GRAFT         Social History:  Social History     Socioeconomic History   • Marital status:    Tobacco Use   • Smoking status: Former Smoker     Packs/day: 1.50     Types: Cigarettes     Quit date:      Years since quittin.1   • Smokeless tobacco: Never Used   Vaping Use   • Vaping Use: Never used   Substance and Sexual Activity   • Alcohol use: Yes     Alcohol/week: 14.0 standard drinks     Types: 14 Glasses of wine per week     Comment: routinely    • Drug use: No   • Sexual activity: Defer       Review of Systems:  The following systems were reviewed as they relate to the cardiovascular system: Constitutional, Eyes, ENT, Cardiovascular, Respiratory, Gastrointestinal, Integumentary, Neurological, Psychiatric, Hematologic, Endocrine, Musculoskeletal, and Genitourinary. The pertinent cardiovascular findings are  reported above with all other cardiovascular points within those systems being negative.    Diagnostic Study Review:     Current Electrocardiogram:  Procedures no new EKG.  EKG dated 1/20/2022 demonstrates sinus bradycardia with a ventricular rate of 56 bpm.    Laboratory Data:  Lab Results   Component Value Date    BUN 17 01/23/2020    CREATININE 0.5 (L) 01/23/2020    BCR 34.7 01/23/2020    K 4.3 01/23/2020    CO2 28 01/23/2020    CALCIUM 8.8 01/23/2020    ALBUMIN 4.0 01/23/2020    LABIL2 1.3 01/23/2020    AST 30 01/23/2020    ALT 26 01/23/2020     Lab Results   Component Value Date    CALCIUM 8.8 01/23/2020     01/23/2020    K 4.3 01/23/2020    CO2 28 01/23/2020     01/23/2020    BUN 17 01/23/2020    CREATININE 0.5 (L) 01/23/2020    BCR 34.7 01/23/2020     Lab Results   Component Value Date    WBC 3.74 (L) 01/23/2020    HGB 14.4 01/23/2020    HCT 44.5 01/23/2020    MCV 88.1 01/23/2020     (L) 01/23/2020     No results found for: CHOL, CHLPL, TRIG, HDL, LDL, LDLDIRECT  No results found for: HGBA1C  No results found for: INR, PROTIME    Most Recent Echo:       Most Recent Stress Test:  Results for orders placed during the hospital encounter of 07/20/21    Stress Test With Myocardial Perfusion One Day    Interpretation Summary  · Myocardial perfusion imaging indicates a normal myocardial perfusion study with no evidence of ischemia.  · Fixed apical defect likely secondary to technical factors given normal wall motion in this area  · Left ventricular ejection fraction is hyperdynamic (Calculated EF > 70%). .  · Findings consistent with a normal ECG stress test.  · Impressions are consistent with a low risk study.  · There is no prior study available for comparison.       Most Recent Cardiac Catheterization:   No results found for this or any previous visit.       NOTE: The following portions of the patient's note were reviewed, confirmed and/or updated this visit as appropriate: History of present  illness/Interval history, physical examination, assessment & plan, allergies, current medications, past family history, past medical history, past social history, past surgical history and problem list.

## 2022-03-07 RX ORDER — ROSUVASTATIN CALCIUM 10 MG/1
10 TABLET, COATED ORAL DAILY
Qty: 90 TABLET | Refills: 3 | Status: SHIPPED | OUTPATIENT
Start: 2022-03-07 | End: 2023-02-27

## 2022-03-07 RX ORDER — EZETIMIBE 10 MG/1
10 TABLET ORAL DAILY
Qty: 90 TABLET | Refills: 1 | Status: SHIPPED | OUTPATIENT
Start: 2022-03-07 | End: 2022-08-29

## 2022-03-08 RX ORDER — PANTOPRAZOLE SODIUM 40 MG/1
40 TABLET, DELAYED RELEASE ORAL DAILY
Qty: 90 TABLET | Refills: 3 | Status: SHIPPED | OUTPATIENT
Start: 2022-03-08 | End: 2023-02-27

## 2022-03-14 ENCOUNTER — APPOINTMENT (OUTPATIENT)
Dept: VASCULAR SURGERY | Facility: HOSPITAL | Age: 82
End: 2022-03-14

## 2022-03-14 PROCEDURE — G0463 HOSPITAL OUTPT CLINIC VISIT: HCPCS

## 2022-03-16 ENCOUNTER — TRANSCRIBE ORDERS (OUTPATIENT)
Dept: ADMINISTRATIVE | Facility: HOSPITAL | Age: 82
End: 2022-03-16

## 2022-03-16 DIAGNOSIS — I65.23 CAROTID STENOSIS, BILATERAL: Primary | ICD-10-CM

## 2022-03-25 ENCOUNTER — APPOINTMENT (OUTPATIENT)
Dept: CT IMAGING | Facility: HOSPITAL | Age: 82
End: 2022-03-25

## 2022-03-25 ENCOUNTER — HOSPITAL ENCOUNTER (OUTPATIENT)
Dept: CT IMAGING | Facility: HOSPITAL | Age: 82
Discharge: HOME | End: 2022-03-25
Admitting: SURGERY

## 2022-03-25 DIAGNOSIS — I65.23 CAROTID STENOSIS, BILATERAL: ICD-10-CM

## 2022-03-25 PROCEDURE — 82565 ASSAY OF CREATININE: CPT

## 2022-03-25 PROCEDURE — 0 IOPAMIDOL PER 1 ML: Performed by: SURGERY

## 2022-03-25 PROCEDURE — 70496 CT ANGIOGRAPHY HEAD: CPT

## 2022-03-25 PROCEDURE — 70498 CT ANGIOGRAPHY NECK: CPT

## 2022-03-25 RX ADMIN — IOPAMIDOL 100 ML: 755 INJECTION, SOLUTION INTRAVENOUS at 15:37

## 2022-03-26 LAB
CREAT BLDA-MCNC: 0.7 MG/DL (ref 0.6–1.3)
EGFRCR SERPLBLD CKD-EPI 2021: 87.6 ML/MIN/1.73

## 2022-03-28 ENCOUNTER — APPOINTMENT (OUTPATIENT)
Dept: VASCULAR SURGERY | Facility: HOSPITAL | Age: 82
End: 2022-03-28

## 2022-03-28 PROCEDURE — G0463 HOSPITAL OUTPT CLINIC VISIT: HCPCS

## 2022-04-06 ENCOUNTER — HOSPITAL ENCOUNTER (OUTPATIENT)
Dept: CARDIOLOGY | Facility: HOSPITAL | Age: 82
Discharge: HOME | End: 2022-04-06

## 2022-04-06 ENCOUNTER — LAB (OUTPATIENT)
Dept: LAB | Facility: HOSPITAL | Age: 82
End: 2022-04-06

## 2022-04-06 LAB
ANION GAP SERPL CALCULATED.3IONS-SCNC: 10.3 MMOL/L (ref 5–15)
BUN SERPL-MCNC: 16 MG/DL (ref 8–23)
BUN/CREAT SERPL: 27.6 (ref 7–25)
CALCIUM SPEC-SCNC: 9.8 MG/DL (ref 8.6–10.5)
CHLORIDE SERPL-SCNC: 102 MMOL/L (ref 98–107)
CO2 SERPL-SCNC: 26.7 MMOL/L (ref 22–29)
CREAT SERPL-MCNC: 0.58 MG/DL (ref 0.57–1)
DEPRECATED RDW RBC AUTO: 39.6 FL (ref 37–54)
EGFRCR SERPLBLD CKD-EPI 2021: 91.6 ML/MIN/1.73
ERYTHROCYTE [DISTWIDTH] IN BLOOD BY AUTOMATED COUNT: 11.8 % (ref 12.3–15.4)
GLUCOSE SERPL-MCNC: 77 MG/DL (ref 65–99)
HCT VFR BLD AUTO: 44.3 % (ref 34–46.6)
HGB BLD-MCNC: 14.2 G/DL (ref 12–15.9)
MCH RBC QN AUTO: 29.3 PG (ref 26.6–33)
MCHC RBC AUTO-ENTMCNC: 32.1 G/DL (ref 31.5–35.7)
MCV RBC AUTO: 91.5 FL (ref 79–97)
PLATELET # BLD AUTO: 175 10*3/MM3 (ref 140–450)
PMV BLD AUTO: 11.3 FL (ref 6–12)
POTASSIUM SERPL-SCNC: 4.7 MMOL/L (ref 3.5–5.2)
QT INTERVAL: 459 MS
RBC # BLD AUTO: 4.84 10*6/MM3 (ref 3.77–5.28)
SODIUM SERPL-SCNC: 139 MMOL/L (ref 136–145)
WBC NRBC COR # BLD: 5.56 10*3/MM3 (ref 3.4–10.8)

## 2022-04-06 PROCEDURE — 93005 ELECTROCARDIOGRAM TRACING: CPT | Performed by: SURGERY

## 2022-04-06 PROCEDURE — 93010 ELECTROCARDIOGRAM REPORT: CPT | Performed by: INTERNAL MEDICINE

## 2022-04-06 PROCEDURE — 85027 COMPLETE CBC AUTOMATED: CPT

## 2022-04-06 PROCEDURE — 80048 BASIC METABOLIC PNL TOTAL CA: CPT

## 2022-04-12 ENCOUNTER — LAB (OUTPATIENT)
Dept: LAB | Facility: HOSPITAL | Age: 82
End: 2022-04-12

## 2022-04-12 LAB — SARS-COV-2 ORF1AB RESP QL NAA+PROBE: NOT DETECTED

## 2022-04-12 PROCEDURE — U0004 COV-19 TEST NON-CDC HGH THRU: HCPCS

## 2022-04-12 PROCEDURE — C9803 HOPD COVID-19 SPEC COLLECT: HCPCS

## 2022-04-12 PROCEDURE — U0005 INFEC AGEN DETEC AMPLI PROBE: HCPCS

## 2022-04-14 ENCOUNTER — ANESTHESIA EVENT (OUTPATIENT)
Dept: PERIOP | Facility: HOSPITAL | Age: 82
End: 2022-04-14

## 2022-04-14 ENCOUNTER — HOSPITAL ENCOUNTER (OUTPATIENT)
Dept: CARDIOLOGY | Facility: HOSPITAL | Age: 82
Discharge: HOME | End: 2022-04-14

## 2022-04-14 ENCOUNTER — ANESTHESIA (OUTPATIENT)
Dept: PERIOP | Facility: HOSPITAL | Age: 82
End: 2022-04-14

## 2022-04-14 ENCOUNTER — HOSPITAL ENCOUNTER (INPATIENT)
Facility: HOSPITAL | Age: 82
LOS: 2 days | Discharge: HOME | End: 2022-04-16
Attending: SURGERY | Admitting: SURGERY

## 2022-04-14 DIAGNOSIS — I65.22 STENOSIS OF LEFT CAROTID ARTERY: ICD-10-CM

## 2022-04-14 PROBLEM — I65.23 CAROTID STENOSIS, ASYMPTOMATIC, BILATERAL: Status: ACTIVE | Noted: 2022-04-14

## 2022-04-14 LAB
ABO GROUP BLD: NORMAL
ACT BLD: 148 SECONDS (ref 89–137)
ACT BLD: 255 SECONDS (ref 89–137)
ACT BLD: 327 SECONDS (ref 89–137)
BH CV XLRA MEAS LEFT DIST CCA EDV: 11 CM/SEC
BH CV XLRA MEAS LEFT DIST CCA PSV: 126 CM/SEC
BH CV XLRA MEAS LEFT DIST ICA EDV: 11 CM/SEC
BH CV XLRA MEAS LEFT DIST ICA PSV: 104 CM/SEC
BH CV XLRA MEAS LEFT PROX ECA PSV: 79 CM/SEC
BLD GP AB SCN SERPL QL: NEGATIVE
MAXIMAL PREDICTED HEART RATE: 140 BPM
RH BLD: POSITIVE
STRESS TARGET HR: 119 BPM
T&S EXPIRATION DATE: NORMAL

## 2022-04-14 PROCEDURE — 25010000002 ONDANSETRON PER 1 MG: Performed by: SURGERY

## 2022-04-14 PROCEDURE — 94799 UNLISTED PULMONARY SVC/PX: CPT

## 2022-04-14 PROCEDURE — 0 MORPHINE SULFATE 4 MG/ML SOLUTION: Performed by: SURGERY

## 2022-04-14 PROCEDURE — 03UN0KZ SUPPLEMENT LEFT EXTERNAL CAROTID ARTERY WITH NONAUTOLOGOUS TISSUE SUBSTITUTE, OPEN APPROACH: ICD-10-PCS | Performed by: INTERNAL MEDICINE

## 2022-04-14 PROCEDURE — 25010000002 ONDANSETRON PER 1 MG: Performed by: ANESTHESIOLOGIST ASSISTANT

## 2022-04-14 PROCEDURE — 25010000002 FENTANYL CITRATE (PF) 50 MCG/ML SOLUTION: Performed by: ANESTHESIOLOGIST ASSISTANT

## 2022-04-14 PROCEDURE — 25010000002 PROTAMINE SULFATE PER 10 MG: Performed by: ANESTHESIOLOGIST ASSISTANT

## 2022-04-14 PROCEDURE — 86900 BLOOD TYPING SEROLOGIC ABO: CPT | Performed by: ANESTHESIOLOGY

## 2022-04-14 PROCEDURE — 03CN0ZZ EXTIRPATION OF MATTER FROM LEFT EXTERNAL CAROTID ARTERY, OPEN APPROACH: ICD-10-PCS | Performed by: INTERNAL MEDICINE

## 2022-04-14 PROCEDURE — 86901 BLOOD TYPING SEROLOGIC RH(D): CPT

## 2022-04-14 PROCEDURE — 25010000002 HEPARIN (PORCINE) 1000-0.9 UT/500ML-% SOLUTION: Performed by: ANESTHESIOLOGY

## 2022-04-14 PROCEDURE — 25010000002 PROPOFOL 10 MG/ML EMULSION: Performed by: ANESTHESIOLOGIST ASSISTANT

## 2022-04-14 PROCEDURE — 93882 EXTRACRANIAL UNI/LTD STUDY: CPT

## 2022-04-14 PROCEDURE — 86850 RBC ANTIBODY SCREEN: CPT | Performed by: ANESTHESIOLOGY

## 2022-04-14 PROCEDURE — C1768 GRAFT, VASCULAR: HCPCS | Performed by: SURGERY

## 2022-04-14 PROCEDURE — 25010000002 DEXAMETHASONE PER 1 MG: Performed by: ANESTHESIOLOGIST ASSISTANT

## 2022-04-14 PROCEDURE — 86900 BLOOD TYPING SEROLOGIC ABO: CPT

## 2022-04-14 PROCEDURE — 03CL0ZZ EXTIRPATION OF MATTER FROM LEFT INTERNAL CAROTID ARTERY, OPEN APPROACH: ICD-10-PCS | Performed by: INTERNAL MEDICINE

## 2022-04-14 PROCEDURE — 25010000002 FENTANYL CITRATE (PF) 100 MCG/2ML SOLUTION: Performed by: ANESTHESIOLOGIST ASSISTANT

## 2022-04-14 PROCEDURE — 25010000002 HEPARIN (PORCINE) PER 1000 UNITS: Performed by: SURGERY

## 2022-04-14 PROCEDURE — 03CJ0ZZ EXTIRPATION OF MATTER FROM LEFT COMMON CAROTID ARTERY, OPEN APPROACH: ICD-10-PCS | Performed by: INTERNAL MEDICINE

## 2022-04-14 PROCEDURE — 94640 AIRWAY INHALATION TREATMENT: CPT

## 2022-04-14 PROCEDURE — C1889 IMPLANT/INSERT DEVICE, NOC: HCPCS | Performed by: SURGERY

## 2022-04-14 PROCEDURE — 85347 COAGULATION TIME ACTIVATED: CPT

## 2022-04-14 PROCEDURE — 25010000002 CEFAZOLIN PER 500 MG: Performed by: SURGERY

## 2022-04-14 PROCEDURE — 25010000002 PHENYLEPHRINE 10 MG/ML SOLUTION 5 ML VIAL: Performed by: ANESTHESIOLOGIST ASSISTANT

## 2022-04-14 PROCEDURE — 86901 BLOOD TYPING SEROLOGIC RH(D): CPT | Performed by: ANESTHESIOLOGY

## 2022-04-14 DEVICE — VASCU-GUARD PERIPHERAL VASCULAR PATCH IS PREPARED FROM BOVINE PERICARDIUM WHICH IS CROSS-LINKED WITH GLUTARALDEHYDE. THE PERICARDIUM IS PROCURED FROM CATTLE ORIGINATING IN THE UNITED STATES. VASCU-GUARD PERIPHERAL VASCULAR PATCH IS CHEMICALLY STERILIZED USING ETHANOL AND PROPYLENE OXIDE. VASCU-GUARD PERIPHERAL VASCULAR PATCH HAS BEEN TREATED WITH 1 MOLAR SODIUM HYDROXIDE FOR A MINIMUM OF 60 MINUTES AT 20 - 25°C.  VASCU-GUARD PERIPHERAL VASCULAR PATCH IS PACKAGED IN A CONTAINER FILLED WITH STERILE, NON-PYROGENIC WATER CONTAINING PROPYLENE OXIDE. THE CONTENTS OF THE UNOPENED, UNDAMAGED CONTAINER ARE STERILE.
Type: IMPLANTABLE DEVICE | Site: CAROTID | Status: FUNCTIONAL
Brand: VASCU-GUARD PERIPHERAL VASCULAR PATCH

## 2022-04-14 DEVICE — LIGACLIP MCA MULTIPLE CLIP APPLIERS, 20 SMALL CLIPS
Type: IMPLANTABLE DEVICE | Site: NECK | Status: FUNCTIONAL
Brand: LIGACLIP

## 2022-04-14 RX ORDER — ASPIRIN 81 MG/1
81 TABLET ORAL DAILY
Status: DISCONTINUED | OUTPATIENT
Start: 2022-04-14 | End: 2022-04-16 | Stop reason: HOSPADM

## 2022-04-14 RX ORDER — SODIUM CHLORIDE 0.9 % (FLUSH) 0.9 %
10 SYRINGE (ML) INJECTION EVERY 12 HOURS SCHEDULED
Status: DISCONTINUED | OUTPATIENT
Start: 2022-04-14 | End: 2022-04-16 | Stop reason: HOSPADM

## 2022-04-14 RX ORDER — SODIUM CHLORIDE 0.9 % (FLUSH) 0.9 %
10 SYRINGE (ML) INJECTION AS NEEDED
Status: DISCONTINUED | OUTPATIENT
Start: 2022-04-14 | End: 2022-04-16 | Stop reason: HOSPADM

## 2022-04-14 RX ORDER — EPHEDRINE SULFATE 5 MG/ML
INJECTION INTRAVENOUS AS NEEDED
Status: DISCONTINUED | OUTPATIENT
Start: 2022-04-14 | End: 2022-04-14 | Stop reason: SURG

## 2022-04-14 RX ORDER — NICARDIPINE HYDROCHLORIDE 2.5 MG/ML
INJECTION INTRAVENOUS AS NEEDED
Status: DISCONTINUED | OUTPATIENT
Start: 2022-04-14 | End: 2022-04-14 | Stop reason: SURG

## 2022-04-14 RX ORDER — ONDANSETRON 2 MG/ML
INJECTION INTRAMUSCULAR; INTRAVENOUS AS NEEDED
Status: DISCONTINUED | OUTPATIENT
Start: 2022-04-14 | End: 2022-04-14 | Stop reason: SURG

## 2022-04-14 RX ORDER — SODIUM CHLORIDE, SODIUM LACTATE, POTASSIUM CHLORIDE, CALCIUM CHLORIDE 600; 310; 30; 20 MG/100ML; MG/100ML; MG/100ML; MG/100ML
50 INJECTION, SOLUTION INTRAVENOUS CONTINUOUS
Status: DISCONTINUED | OUTPATIENT
Start: 2022-04-14 | End: 2022-04-16 | Stop reason: HOSPADM

## 2022-04-14 RX ORDER — FENTANYL CITRATE 50 UG/ML
INJECTION, SOLUTION INTRAMUSCULAR; INTRAVENOUS AS NEEDED
Status: DISCONTINUED | OUTPATIENT
Start: 2022-04-14 | End: 2022-04-14 | Stop reason: SURG

## 2022-04-14 RX ORDER — ONDANSETRON 2 MG/ML
4 INJECTION INTRAMUSCULAR; INTRAVENOUS ONCE AS NEEDED
Status: DISCONTINUED | OUTPATIENT
Start: 2022-04-14 | End: 2022-04-14 | Stop reason: HOSPADM

## 2022-04-14 RX ORDER — CLOPIDOGREL BISULFATE 75 MG/1
75 TABLET ORAL DAILY
COMMUNITY
End: 2022-06-10 | Stop reason: SDDI

## 2022-04-14 RX ORDER — FENTANYL CITRATE 50 UG/ML
25 INJECTION, SOLUTION INTRAMUSCULAR; INTRAVENOUS
Status: DISCONTINUED | OUTPATIENT
Start: 2022-04-14 | End: 2022-04-14 | Stop reason: HOSPADM

## 2022-04-14 RX ORDER — BISACODYL 5 MG/1
5 TABLET, DELAYED RELEASE ORAL DAILY PRN
Status: DISCONTINUED | OUTPATIENT
Start: 2022-04-14 | End: 2022-04-16 | Stop reason: HOSPADM

## 2022-04-14 RX ORDER — LIDOCAINE HYDROCHLORIDE 10 MG/ML
0.5 INJECTION, SOLUTION INFILTRATION; PERINEURAL ONCE AS NEEDED
Status: DISCONTINUED | OUTPATIENT
Start: 2022-04-14 | End: 2022-04-14 | Stop reason: HOSPADM

## 2022-04-14 RX ORDER — AMOXICILLIN 250 MG
2 CAPSULE ORAL 2 TIMES DAILY
Status: DISCONTINUED | OUTPATIENT
Start: 2022-04-14 | End: 2022-04-16 | Stop reason: HOSPADM

## 2022-04-14 RX ORDER — PANTOPRAZOLE SODIUM 40 MG/1
40 TABLET, DELAYED RELEASE ORAL DAILY
Status: DISCONTINUED | OUTPATIENT
Start: 2022-04-14 | End: 2022-04-16 | Stop reason: HOSPADM

## 2022-04-14 RX ORDER — ACETAMINOPHEN 325 MG/1
650 TABLET ORAL EVERY 4 HOURS PRN
Status: DISCONTINUED | OUTPATIENT
Start: 2022-04-14 | End: 2022-04-16 | Stop reason: HOSPADM

## 2022-04-14 RX ORDER — NALOXONE HCL 0.4 MG/ML
0.4 VIAL (ML) INJECTION AS NEEDED
Status: DISCONTINUED | OUTPATIENT
Start: 2022-04-14 | End: 2022-04-14 | Stop reason: HOSPADM

## 2022-04-14 RX ORDER — ALUMINA, MAGNESIA, AND SIMETHICONE 2400; 2400; 240 MG/30ML; MG/30ML; MG/30ML
15 SUSPENSION ORAL EVERY 6 HOURS PRN
Status: DISCONTINUED | OUTPATIENT
Start: 2022-04-14 | End: 2022-04-16 | Stop reason: HOSPADM

## 2022-04-14 RX ORDER — IPRATROPIUM BROMIDE AND ALBUTEROL SULFATE 2.5; .5 MG/3ML; MG/3ML
3 SOLUTION RESPIRATORY (INHALATION)
Status: DISCONTINUED | OUTPATIENT
Start: 2022-04-14 | End: 2022-04-16 | Stop reason: HOSPADM

## 2022-04-14 RX ORDER — LABETALOL HYDROCHLORIDE 5 MG/ML
10 INJECTION, SOLUTION INTRAVENOUS EVERY 4 HOURS PRN
Status: DISCONTINUED | OUTPATIENT
Start: 2022-04-14 | End: 2022-04-16 | Stop reason: HOSPADM

## 2022-04-14 RX ORDER — METOPROLOL SUCCINATE 25 MG/1
25 TABLET, EXTENDED RELEASE ORAL DAILY
Status: DISCONTINUED | OUTPATIENT
Start: 2022-04-14 | End: 2022-04-16 | Stop reason: HOSPADM

## 2022-04-14 RX ORDER — NALOXONE HCL 0.4 MG/ML
0.4 VIAL (ML) INJECTION
Status: DISCONTINUED | OUTPATIENT
Start: 2022-04-14 | End: 2022-04-16 | Stop reason: HOSPADM

## 2022-04-14 RX ORDER — ONDANSETRON 4 MG/1
4 TABLET, FILM COATED ORAL EVERY 6 HOURS PRN
Status: DISCONTINUED | OUTPATIENT
Start: 2022-04-14 | End: 2022-04-16 | Stop reason: HOSPADM

## 2022-04-14 RX ORDER — SODIUM CHLORIDE 0.9 % (FLUSH) 0.9 %
10 SYRINGE (ML) INJECTION AS NEEDED
Status: DISCONTINUED | OUTPATIENT
Start: 2022-04-14 | End: 2022-04-14 | Stop reason: HOSPADM

## 2022-04-14 RX ORDER — PROTAMINE SULFATE 10 MG/ML
INJECTION, SOLUTION INTRAVENOUS AS NEEDED
Status: DISCONTINUED | OUTPATIENT
Start: 2022-04-14 | End: 2022-04-14 | Stop reason: SURG

## 2022-04-14 RX ORDER — ALBUTEROL SULFATE 2.5 MG/3ML
2.5 SOLUTION RESPIRATORY (INHALATION) ONCE AS NEEDED
Status: DISCONTINUED | OUTPATIENT
Start: 2022-04-14 | End: 2022-04-14 | Stop reason: HOSPADM

## 2022-04-14 RX ORDER — NITROFURANTOIN 25; 75 MG/1; MG/1
100 CAPSULE ORAL EVERY 12 HOURS SCHEDULED
Refills: 4 | Status: DISPENSED | OUTPATIENT
Start: 2022-04-14 | End: 2022-04-16

## 2022-04-14 RX ORDER — ROSUVASTATIN CALCIUM 10 MG/1
10 TABLET, COATED ORAL DAILY
Status: DISCONTINUED | OUTPATIENT
Start: 2022-04-14 | End: 2022-04-15

## 2022-04-14 RX ORDER — NITROGLYCERIN 0.4 MG/1
0.4 TABLET SUBLINGUAL
Status: DISCONTINUED | OUTPATIENT
Start: 2022-04-14 | End: 2022-04-16 | Stop reason: HOSPADM

## 2022-04-14 RX ORDER — LABETALOL HYDROCHLORIDE 5 MG/ML
INJECTION, SOLUTION INTRAVENOUS AS NEEDED
Status: DISCONTINUED | OUTPATIENT
Start: 2022-04-14 | End: 2022-04-14 | Stop reason: SURG

## 2022-04-14 RX ORDER — BISACODYL 10 MG
10 SUPPOSITORY, RECTAL RECTAL DAILY PRN
Status: DISCONTINUED | OUTPATIENT
Start: 2022-04-14 | End: 2022-04-16 | Stop reason: HOSPADM

## 2022-04-14 RX ORDER — SODIUM CHLORIDE, SODIUM LACTATE, POTASSIUM CHLORIDE, CALCIUM CHLORIDE 600; 310; 30; 20 MG/100ML; MG/100ML; MG/100ML; MG/100ML
1000 INJECTION, SOLUTION INTRAVENOUS CONTINUOUS
Status: DISCONTINUED | OUTPATIENT
Start: 2022-04-14 | End: 2022-04-14

## 2022-04-14 RX ORDER — TEMAZEPAM 15 MG/1
15 CAPSULE ORAL NIGHTLY PRN
Status: DISCONTINUED | OUTPATIENT
Start: 2022-04-14 | End: 2022-04-16 | Stop reason: HOSPADM

## 2022-04-14 RX ORDER — ACETAMINOPHEN 325 MG/1
650 TABLET ORAL EVERY 4 HOURS PRN
Status: DISCONTINUED | OUTPATIENT
Start: 2022-04-14 | End: 2022-04-14

## 2022-04-14 RX ORDER — ESCITALOPRAM OXALATE 10 MG/1
5 TABLET ORAL DAILY
Status: DISCONTINUED | OUTPATIENT
Start: 2022-04-14 | End: 2022-04-16 | Stop reason: HOSPADM

## 2022-04-14 RX ORDER — POLYETHYLENE GLYCOL 3350 17 G/17G
17 POWDER, FOR SOLUTION ORAL DAILY PRN
Status: DISCONTINUED | OUTPATIENT
Start: 2022-04-14 | End: 2022-04-16 | Stop reason: HOSPADM

## 2022-04-14 RX ORDER — HYDROCODONE BITARTRATE AND ACETAMINOPHEN 5; 325 MG/1; MG/1
1 TABLET ORAL EVERY 4 HOURS PRN
Status: DISCONTINUED | OUTPATIENT
Start: 2022-04-14 | End: 2022-04-16 | Stop reason: HOSPADM

## 2022-04-14 RX ORDER — BUDESONIDE 0.5 MG/2ML
0.5 INHALANT ORAL
Status: DISCONTINUED | OUTPATIENT
Start: 2022-04-14 | End: 2022-04-16 | Stop reason: HOSPADM

## 2022-04-14 RX ORDER — DEXAMETHASONE SODIUM PHOSPHATE 4 MG/ML
INJECTION, SOLUTION INTRA-ARTICULAR; INTRALESIONAL; INTRAMUSCULAR; INTRAVENOUS; SOFT TISSUE AS NEEDED
Status: DISCONTINUED | OUTPATIENT
Start: 2022-04-14 | End: 2022-04-14 | Stop reason: SURG

## 2022-04-14 RX ORDER — ACETAMINOPHEN 650 MG/1
650 SUPPOSITORY RECTAL EVERY 4 HOURS PRN
Status: DISCONTINUED | OUTPATIENT
Start: 2022-04-14 | End: 2022-04-16 | Stop reason: HOSPADM

## 2022-04-14 RX ORDER — LIDOCAINE HYDROCHLORIDE 10 MG/ML
INJECTION, SOLUTION EPIDURAL; INFILTRATION; INTRACAUDAL; PERINEURAL AS NEEDED
Status: DISCONTINUED | OUTPATIENT
Start: 2022-04-14 | End: 2022-04-14 | Stop reason: SURG

## 2022-04-14 RX ORDER — HEPARIN SODIUM 200 [USP'U]/100ML
INJECTION, SOLUTION INTRAVENOUS
Status: COMPLETED | OUTPATIENT
Start: 2022-04-14 | End: 2022-04-14

## 2022-04-14 RX ORDER — GLYCOPYRROLATE 1 MG/5 ML
SYRINGE (ML) INTRAVENOUS AS NEEDED
Status: DISCONTINUED | OUTPATIENT
Start: 2022-04-14 | End: 2022-04-14 | Stop reason: SURG

## 2022-04-14 RX ORDER — ROCURONIUM BROMIDE 10 MG/ML
INJECTION, SOLUTION INTRAVENOUS AS NEEDED
Status: DISCONTINUED | OUTPATIENT
Start: 2022-04-14 | End: 2022-04-14 | Stop reason: SURG

## 2022-04-14 RX ORDER — MORPHINE SULFATE 4 MG/ML
2 INJECTION, SOLUTION INTRAMUSCULAR; INTRAVENOUS EVERY 4 HOURS PRN
Status: DISCONTINUED | OUTPATIENT
Start: 2022-04-14 | End: 2022-04-16 | Stop reason: HOSPADM

## 2022-04-14 RX ORDER — IPRATROPIUM BROMIDE AND ALBUTEROL SULFATE 2.5; .5 MG/3ML; MG/3ML
3 SOLUTION RESPIRATORY (INHALATION)
Status: DISCONTINUED | OUTPATIENT
Start: 2022-04-14 | End: 2022-04-14

## 2022-04-14 RX ORDER — ONDANSETRON 2 MG/ML
4 INJECTION INTRAMUSCULAR; INTRAVENOUS EVERY 6 HOURS PRN
Status: DISCONTINUED | OUTPATIENT
Start: 2022-04-14 | End: 2022-04-16 | Stop reason: HOSPADM

## 2022-04-14 RX ORDER — PROPOFOL 10 MG/ML
VIAL (ML) INTRAVENOUS AS NEEDED
Status: DISCONTINUED | OUTPATIENT
Start: 2022-04-14 | End: 2022-04-14 | Stop reason: SURG

## 2022-04-14 RX ORDER — LABETALOL HYDROCHLORIDE 5 MG/ML
5 INJECTION, SOLUTION INTRAVENOUS
Status: DISCONTINUED | OUTPATIENT
Start: 2022-04-14 | End: 2022-04-14 | Stop reason: HOSPADM

## 2022-04-14 RX ADMIN — HEPARIN SODIUM 1000 UNITS: 200 INJECTION, SOLUTION INTRAVENOUS at 08:18

## 2022-04-14 RX ADMIN — PROPOFOL 30 MG: 10 INJECTION, EMULSION INTRAVENOUS at 08:08

## 2022-04-14 RX ADMIN — SODIUM CHLORIDE, POTASSIUM CHLORIDE, SODIUM LACTATE AND CALCIUM CHLORIDE 50 ML/HR: 600; 310; 30; 20 INJECTION, SOLUTION INTRAVENOUS at 11:16

## 2022-04-14 RX ADMIN — Medication 10 ML: at 13:23

## 2022-04-14 RX ADMIN — Medication 0.2 MG: at 07:48

## 2022-04-14 RX ADMIN — LABETALOL 20 MG/4 ML (5 MG/ML) INTRAVENOUS SYRINGE 10 MG: at 09:56

## 2022-04-14 RX ADMIN — FENTANYL CITRATE 50 MCG: 50 INJECTION INTRAMUSCULAR; INTRAVENOUS at 09:27

## 2022-04-14 RX ADMIN — WATER 2 G: 1000 INJECTION, SOLUTION INTRAVENOUS at 15:23

## 2022-04-14 RX ADMIN — Medication 10 ML: at 20:02

## 2022-04-14 RX ADMIN — WATER 2 G: 1000 INJECTION, SOLUTION INTRAVENOUS at 23:11

## 2022-04-14 RX ADMIN — PROTAMINE SULFATE 50 MG: 10 INJECTION, SOLUTION INTRAVENOUS at 09:29

## 2022-04-14 RX ADMIN — LABETALOL 20 MG/4 ML (5 MG/ML) INTRAVENOUS SYRINGE 5 MG: at 09:25

## 2022-04-14 RX ADMIN — ASPIRIN 81 MG: 81 TABLET, COATED ORAL at 11:34

## 2022-04-14 RX ADMIN — BUDESONIDE 0.5 MG: 0.5 INHALANT RESPIRATORY (INHALATION) at 19:55

## 2022-04-14 RX ADMIN — LOSARTAN POTASSIUM 75 MG: 25 TABLET, FILM COATED ORAL at 11:31

## 2022-04-14 RX ADMIN — Medication 10 ML: at 11:34

## 2022-04-14 RX ADMIN — PROPOFOL 150 MG: 10 INJECTION, EMULSION INTRAVENOUS at 07:43

## 2022-04-14 RX ADMIN — PHENYLEPHRINE HYDROCHLORIDE 0.5 MCG/KG/MIN: 10 INJECTION INTRAVENOUS at 07:53

## 2022-04-14 RX ADMIN — SENNOSIDES AND DOCUSATE SODIUM 2 TABLET: 50; 8.6 TABLET ORAL at 20:00

## 2022-04-14 RX ADMIN — DEXAMETHASONE SODIUM PHOSPHATE 8 MG: 4 INJECTION, SOLUTION INTRAMUSCULAR; INTRAVENOUS at 08:06

## 2022-04-14 RX ADMIN — FENTANYL CITRATE 50 MCG: 50 INJECTION INTRAMUSCULAR; INTRAVENOUS at 07:37

## 2022-04-14 RX ADMIN — LIDOCAINE HYDROCHLORIDE 40 MG: 10 INJECTION, SOLUTION EPIDURAL; INFILTRATION; INTRACAUDAL; PERINEURAL at 07:43

## 2022-04-14 RX ADMIN — NICARDIPINE HYDROCHLORIDE 2.5 MG/HR: 25 INJECTION, SOLUTION INTRAVENOUS at 14:17

## 2022-04-14 RX ADMIN — ROSUVASTATIN 10 MG: 10 TABLET, FILM COATED ORAL at 11:31

## 2022-04-14 RX ADMIN — SODIUM CHLORIDE, POTASSIUM CHLORIDE, SODIUM LACTATE AND CALCIUM CHLORIDE 1000 ML: 600; 310; 30; 20 INJECTION, SOLUTION INTRAVENOUS at 06:52

## 2022-04-14 RX ADMIN — LABETALOL 20 MG/4 ML (5 MG/ML) INTRAVENOUS SYRINGE 2.5 MG: at 09:46

## 2022-04-14 RX ADMIN — ROCURONIUM BROMIDE 40 MG: 10 INJECTION INTRAVENOUS at 07:43

## 2022-04-14 RX ADMIN — EPHEDRINE SULFATE 5 MG: 5 INJECTION INTRAVENOUS at 09:40

## 2022-04-14 RX ADMIN — LABETALOL 20 MG/4 ML (5 MG/ML) INTRAVENOUS SYRINGE 10 MG: at 12:33

## 2022-04-14 RX ADMIN — HYDROCODONE BITARTRATE AND ACETAMINOPHEN 1 TABLET: 5; 325 TABLET ORAL at 14:10

## 2022-04-14 RX ADMIN — NICARDIPINE HYDROCHLORIDE 1 MG: 25 INJECTION, SOLUTION INTRAVENOUS at 10:00

## 2022-04-14 RX ADMIN — SUGAMMADEX 200 MG: 100 INJECTION, SOLUTION INTRAVENOUS at 09:52

## 2022-04-14 RX ADMIN — FENTANYL CITRATE 50 MCG: 50 INJECTION INTRAMUSCULAR; INTRAVENOUS at 08:33

## 2022-04-14 RX ADMIN — HEPARIN SODIUM 8000 UNITS: 200 INJECTION, SOLUTION INTRAVENOUS at 08:32

## 2022-04-14 RX ADMIN — ONDANSETRON 4 MG: 2 INJECTION INTRAMUSCULAR; INTRAVENOUS at 11:58

## 2022-04-14 RX ADMIN — NICARDIPINE HYDROCHLORIDE 5 MG/HR: 25 INJECTION, SOLUTION INTRAVENOUS at 18:43

## 2022-04-14 RX ADMIN — CEFAZOLIN SODIUM 2 G: 1 INJECTION, POWDER, FOR SOLUTION INTRAMUSCULAR; INTRAVENOUS at 07:55

## 2022-04-14 RX ADMIN — IPRATROPIUM BROMIDE AND ALBUTEROL SULFATE 3 ML: .5; 3 SOLUTION RESPIRATORY (INHALATION) at 19:59

## 2022-04-14 RX ADMIN — MORPHINE SULFATE 2 MG: 4 INJECTION INTRAVENOUS at 21:00

## 2022-04-14 RX ADMIN — LABETALOL 20 MG/4 ML (5 MG/ML) INTRAVENOUS SYRINGE 2.5 MG: at 09:49

## 2022-04-14 RX ADMIN — NITROFURANTOIN (MONOHYDRATE/MACROCRYSTALS) 100 MG: 75; 25 CAPSULE ORAL at 19:59

## 2022-04-14 RX ADMIN — FENTANYL CITRATE 50 MCG: 50 INJECTION INTRAMUSCULAR; INTRAVENOUS at 07:42

## 2022-04-14 RX ADMIN — ONDANSETRON 4 MG: 2 INJECTION INTRAMUSCULAR; INTRAVENOUS at 09:30

## 2022-04-14 RX ADMIN — FENTANYL CITRATE 25 MCG: 50 INJECTION, SOLUTION INTRAMUSCULAR; INTRAVENOUS at 10:40

## 2022-04-14 NOTE — ANESTHESIA PREPROCEDURE EVALUATION
Anesthesia Evaluation     Patient summary reviewed and Nursing notes reviewed   NPO Solid Status: > 6 hours  NPO Liquid Status: > 6 hours           Airway   Mallampati: II  TM distance: >3 FB  Neck ROM: full  No difficulty expected  Dental - normal exam     Pulmonary - negative pulmonary ROS and normal exam    breath sounds clear to auscultation  Cardiovascular - normal exam    ECG reviewed  Rhythm: regular  Rate: normal    (+) hypertension, CAD, CABG, dysrhythmias, angina, hyperlipidemia,  carotid artery disease      Neuro/Psych  (+) dizziness/light headedness, psychiatric history,    GI/Hepatic/Renal/Endo    (+)  GERD,      Musculoskeletal (-) negative ROS    Abdominal  - normal exam    Abdomen: soft.  Bowel sounds: normal.   Substance History - negative use     OB/GYN negative ob/gyn ROS         Other - negative ROS                       Anesthesia Plan    ASA 4     general and [place]se     intravenous induction     Anesthetic plan, all risks, benefits, and alternatives have been provided, discussed and informed consent has been obtained with: patient.  Use of blood products discussed with patient  Consented to blood products.   Plan discussed with CAA.        CODE STATUS:

## 2022-04-14 NOTE — ANESTHESIA POSTPROCEDURE EVALUATION
Patient: Elizabeth Whaley    Procedure Summary     Date: 04/14/22 Room / Location: [facility] OR  / [facility] MAIN OR    Anesthesia Start: 0737 Anesthesia Stop: 1008    Procedure: LEFT CAROTID ENDARTERECTOMY (Left Neck) Diagnosis:       Carotid stenosis      (Carotid stenosis [I65.29])    Surgeons: Dilshad Domingo MD Provider: Sam Neal MD    Anesthesia Type: general, [place]se ASA Status: 4          Anesthesia Type: general, [place]    Vitals  Vitals Value Taken Time   /51 04/14/22 1050   Temp 97.8 °F (36.6 °C) 04/14/22 1045   Pulse 64 04/14/22 1052   Resp 14 04/14/22 1050   SpO2 87 % 04/14/22 1052   Vitals shown include unvalidated device data.        Post Anesthesia Care and Evaluation    Patient location during evaluation: bedside  Patient participation: complete - patient participated  Level of consciousness: awake and alert  Pain score: 1  Pain management: adequate  Airway patency: patent  Anesthetic complications: No anesthetic complications  PONV Status: none  Cardiovascular status: acceptable  Respiratory status: acceptable  Hydration status: acceptable  Post Neuraxial Block status: Motor and sensory function returned to baseline

## 2022-04-14 NOTE — ANESTHESIA PROCEDURE NOTES
Arterial Line      Patient location during procedure: OR  Start time: 4/14/2022 7:45 AM  Stop Time:4/14/2022 7:47 AM       Line placed for hemodynamic monitoring and ABGs/Labs/ISTAT.  Performed By   Anesthesiologist: Sam Neal MD  Preanesthetic Checklist  Completed: patient identified, IV checked, site marked, risks and benefits discussed, surgical consent, monitors and equipment checked, pre-op evaluation and timeout performed  Arterial Line Prep   Sterile Tech: cap, gloves and mask  Prep: ChloraPrep  Patient monitoring: blood pressure monitoring, continuous pulse oximetry and EKG  Arterial Line Procedure   Laterality:right  Location:  radial artery  Catheter size: 20 G   Guidance: landmark technique and palpation technique  Number of attempts: 1  Successful placement: yes  Heparin (Porcine) in NaCl 1000-0.9 UT/500ML-% for arterial line pressure bag, 1,000 Units  Post Assessment   Dressing Type: secured with tape and wrist guard applied.   Complications no  Circ/Move/Sens Assessment: normal.   Patient Tolerance: patient tolerated the procedure well with no apparent complications

## 2022-04-14 NOTE — ANESTHESIA PROCEDURE NOTES
Peripheral IV    Patient location during procedure: OR  Start time: 4/14/2022 7:55 AM  End time: 4/14/2022 7:55 AM  Line placed for Fluids/Medication Admin.  Performed By   CRNA: Anish De La Fuente CAA  Preanesthetic Checklist  Completed: patient identified, IV checked, site marked, risks and benefits discussed, surgical consent, monitors and equipment checked, pre-op evaluation and timeout performed  Peripheral IV Prep   Patient position: supine   Prep: ChloraPrep  Patient monitoring: heart rate, cardiac monitor and continuous pulse ox  Peripheral IV Procedure   Laterality:right  Location:  Antecubital  Catheter size: 20 G         Post Assessment   Dressing Type: tape and transparent.    IV Dressing/Site: clean, dry and intact

## 2022-04-14 NOTE — PLAN OF CARE
Goal Outcome Evaluation:  Plan of Care Reviewed With: patient, daughter, grandchild(vonda)           Outcome Evaluation: Pt was brought to the ICU following a left sided carotid endarectomy; Pt is on strict bed rest; Cardene gtt was started with a SBP goal of <150; Pt is alert and oriented x4; currently using an external catheter; 2L O2 nasal cannula; VSS; will continue to monitor

## 2022-04-14 NOTE — ANESTHESIA PROCEDURE NOTES
Airway  Urgency: elective    Date/Time: 4/14/2022 7:45 AM  End Time:4/14/2022 7:45 AM    General Information and Staff    Patient location during procedure: OR    Indications and Patient Condition  Indications for airway management: airway protection    Preoxygenated: yes  MILS maintained throughout  Mask difficulty assessment: 1 - vent by mask    Final Airway Details  Final airway type: endotracheal airway      Successful airway: ETT  Cuffed: yes   Successful intubation technique: direct laryngoscopy  Endotracheal tube insertion site: oral  Blade: Erika  Blade size: 3  ETT size (mm): 7.0  Cormack-Lehane Classification: grade I - full view of glottis  Placement verified by: capnometry   Measured from: lips  ETT/EBT  to lips (cm): 21  Number of attempts at approach: 1  Assessment: lips, teeth, and gum same as pre-op and atraumatic intubation    Additional Comments  Lta 3cc 40%

## 2022-04-14 NOTE — OP NOTE
Date of Admission:  4/14/2022  Today's Date:  04/14/22  Dilshad Domingo MD  [facility]    Preoperative Diagnosis:   Severe asymptomatic carotid artery stenosis.    Postoperative Diagnosis:   Same    Procedure Performed:   Left carotid endarterectomy with intraoperative shunting, bovine pericardial patch angioplasty, and completion duplex.    CPT:89540    Surgeon:   Dilshad Domingo MD    Assistant:    Laurie Coleman RN    Anesthesia:   General    Estimated Blood Loss:    cc    Findings:    Successful left carotid endarterectomy with intraoperative shunting.  Severe bulky highly calcified valvular stenosis.  Overall high lesion.  Standard bovine pericardial patch angioplasty.    Implants:    Implant Name Type Inv. Item Serial No.  Lot No. LRB No. Used Action   CLIPAPPLR M/ ENDO LIGACLIP 9 3/8IN SM - [id number] Implant CLIPAPPLR M/ ENDO LIGACLIP 9 3/8IN SM  [employer]  DIV [employer] J [employer] J [id number] Left 1 Implanted   PTCH VASCUGUARD 0.8X8CM - [id number] Implant PTCH VASCUGUARD 0.8X8CM  [facility] [id number] Left 1 Implanted   CLIPAPPLR M/ ENDO LIGACLIP 9 3/8IN SM - [id number] Implant CLIPAPPLR M/ ENDO LIGACLIP 9 3/8IN SM  [employer]  DIV OF J [employer] J [id number] Left 1 Implanted       Staff:   Circulator: Roselia Dennis RN; Francie Whitaker RN  Scrub Person: Julieta Julian  Assistant: Laurie Coleman    Specimen:   none    Complications:   none    Dispo:   to PACU    Indication for procedure:   81 y.o. female with severe left carotid artery stenosis that is asymptomatic.  I discussed the risks and benefits of the surgery with the patient and her family.  Informed consent obtained.    Description of procedure:   The patient was taken to the operating room, anesthesia was induced without difficulty. Surgical sites were prepped and draped in the usual sterile manner. A full surgical timeout was performed. Standard oblique left carotid endarterectomy incision was made on the anterior border of  the sternocleidomastoid. Bovie electrocautery was used to dissect down through the platysma, as well as retracting sternocleidomastoid posterolaterally. The carotid sheath was opened up along its length. Facial vein was divided between silk suture ligatures. Carotid was identified and snared at proximal common carotid artery just above the omohyoid. Distal dissection was performed up the carotid. Care was taken to identify and protect the vagus and hypoglossal nerve. The carotid region was somewhat high but I was able to get the distal ICA that was without disease. Heparinization was performed after adequate timing and confirmation with ACT's. Clamps were applied and arteriotomy was made and extended with Ortega scissors. External shunt was placed and flushed. Doppler was used to interrogate shunt flow.  Standard endarterectomy was performed of the CCA and ICA with an eversion endarterectomy of the ECA. Standard Bovine pericardial patch angioplasty was performed with appropriate flushing maneuvers taken prior to restoration of flow and removal of the shunt. Vascular lab was summoned to the operative suite and completion duplex showed no defects. Protamine was administered. Meticulous hemostasis was confirmed. Sternocleidomastoid was reapproximated with 3-0 Vicryl, platysma with 4-0 Vicryl, and skin was running closed with 4-0 Vicryl in subcuticular manner. Patient was awakened in the operating room and had no gross motor neurologic deficits.    At case completion, all counts were correct x 3.     Dilshad Domingo MD  04/14/22     There are no hospital problems to display for this patient.

## 2022-04-14 NOTE — CONSULTS
PULMONARY CRITICAL CARE CONSULT NOTE      PATIENT IDENTIFICATION:  Name: Elizabeth Whaley  MRN: [id number]  :  1940     Age: 81 y.o.  Sex: female        DATE OF CONSULTATION:  2022  PRIMARY CARE PHYSICIAN    Gerda Wadsworth MD                  CHIEF COMPLAINT: ICU management     History of Present Illness:   Elizabeth Whaley is a 81 y.o. female with a history of COPD, hx tobacco abuse, A-fib, Hx left breast cancer, CAD, GERD, Hyperlipidemia, HTN, and Palpitations who came in for a left carotid endarterectomy after being seen in office. Our service was asked to see for ICU management.       Review of Systems:   Constitutional: As above    Eyes: negative   ENT/oropharynx: negative   Cardiovascular: As above    Respiratory: As above    Gastrointestinal: negative   Genitourinary: negative   Neurological: negative   Musculoskeletal: negative   Integument/breast: negative   Endocrine: negative   Allergic/Immunologic: negative     Past Medical History:  Past Medical History:   Diagnosis Date   • Anxiety    • Atrial fibrillation (HCC)    • Cancer (HCC)     Left breast Cancer and Skin cancer   • Coronary artery disease    • GERD (gastroesophageal reflux disease)    • Hyperlipidemia    • Hypertension    • Palpitations        Past Surgical History:  Past Surgical History:   Procedure Laterality Date   • BREAST LUMPECTOMY Left    • CARDIAC CATHETERIZATION     • CHOLECYSTECTOMY     • CORONARY ARTERY BYPASS GRAFT     • HYSTERECTOMY          Family History:  Family History   Problem Relation Age of Onset   • Diabetes Mother         Social History:   Social History     Tobacco Use   • Smoking status: Former Smoker     Packs/day: 1.50     Types: Cigarettes     Quit date:      Years since quittin.2   • Smokeless tobacco: Never Used   Substance Use Topics   • Alcohol use: Yes     Alcohol/week: 14.0 standard drinks     Types: 14 Glasses of wine per week     Comment: routinely         Allergies:  Allergies    Allergen Reactions   • Codeine Unknown - Low Severity     Pt Cannot remember   • Levaquin [Levofloxacin] GI Intolerance   • Penicillins Rash   • Sulfa Antibiotics Unknown (See Comments) and Rash       Home Meds:  Medications Prior to Admission   Medication Sig Dispense Refill Last Dose   • aspirin 81 MG tablet Take 81 mg by mouth Daily. HOLD DOS   Past Week at Unknown time   • clopidogrel (PLAVIX) 75 MG tablet Take 75 mg by mouth Daily.      • escitalopram (LEXAPRO) 5 MG tablet Take 5 mg by mouth Daily.   2022 at Unknown time   • ezetimibe (ZETIA) 10 MG tablet TAKE 1 TABLET BY MOUTH DAILY 90 tablet 1 2022 at Unknown time   • losartan (COZAAR) 50 MG tablet Take 75 mg by mouth Daily. Take 1 1/2 tab daily     HOLD 24 hours before surgery   2022 at Unknown time   • metoprolol succinate XL (TOPROL-XL) 25 MG 24 hr tablet TAKE 1 TABLET BY MOUTH DAILY 90 tablet 2 2022 at Unknown time   • nitrofurantoin (MACRODANTIN) 50 MG capsule Every Night. Daily for UTI prevention  4 2022 at Unknown time   • pantoprazole (Protonix) 40 MG EC tablet Take 1 tablet by mouth Daily. 90 tablet 3 2022 at Unknown time   • rosuvastatin (CRESTOR) 10 MG tablet TAKE 1 TABLET BY MOUTH DAILY 90 tablet 3 2022 at Unknown time   • temazepam (RESTORIL) 15 MG capsule Take 15 mg by mouth.   Past Week at Unknown time   • vitamin D3 125 MCG (5000 UT) capsule capsule Take 5,000 Units by mouth Daily.   Past Month at Unknown time   • nitroglycerin (NITROSTAT) 0.4 MG SL tablet As Needed.   Unknown at Unknown time       Objective:  tMax 24 hrs: Temp (24hrs), Av.6 °F (36.4 °C), Min:97.3 °F (36.3 °C), Max:97.8 °F (36.6 °C)      Vitals Ranges:   Temp:  [97.3 °F (36.3 °C)-97.8 °F (36.6 °C)] 97.8 °F (36.6 °C)  Heart Rate:  [59-67] 67  Resp:  [12-14] 14  BP: ()/(30-59) 138/51  Arterial Line BP: (132-190)/(40-65) 179/52    Intake and Output Last 3 Shifts:   No intake/output data recorded.    Exam:  /51   Pulse 67    "Temp 97.8 °F (36.6 °C) (Oral)   Resp 14   Ht 157.5 cm (62\")   Wt 68.5 kg (151 lb)   SpO2 100%   BMI 27.62 kg/m²       04/06/22  1051 04/14/22  0633   Weight: 68.5 kg (151 lb) 68.5 kg (151 lb)     General Appearance: Alert     HEENT:  Normocephalic, without obvious abnormality, atraumaticConjunctiva/corneas clear,.  Normal external ear canals, Nares normal, no drainage  Neck:  Supple, symmetrical, trachea midline. No JVD. Bruising noted to left side of neck   Lungs /Chest wall:   Bilateral basal rhonchi, respirations unlabored symmetrical wall movement.     Heart:  Regular rate and rhythm, systolic murmur PMI left sternal border  Abdomen: Soft, non-tender, no masses, no organomegaly.    Extremities: Trace edema no clubbing or Cyanosis        Data Review:  All labs (24hrs):   Recent Results (from the past 24 hour(s))   Type & Screen    Collection Time: 04/14/22  6:14 AM    Specimen: Blood   Result Value Ref Range    ABO Type A     RH type Positive     Antibody Screen Negative     T&S Expiration Date 4/17/2022 11:59:59 PM    POC Activated Clotting Time    Collection Time: 04/14/22  8:09 AM    Specimen: Arterial Blood   Result Value Ref Range    Activated Clotting Time  148 (H) 89 - 137 Seconds   POC Activated Clotting Time    Collection Time: 04/14/22  8:40 AM    Specimen: Arterial Blood   Result Value Ref Range    Activated Clotting Time  327 (H) 89 - 137 Seconds   POC Activated Clotting Time    Collection Time: 04/14/22  9:18 AM    Specimen: Arterial Blood   Result Value Ref Range    Activated Clotting Time  255 (H) 89 - 137 Seconds   Intra-Op Duplex Carotid Left Lmt CAR    Collection Time: 04/14/22  9:34 AM   Result Value Ref Range    Target HR (85%) 119 bpm    Max. Pred. HR (100%) 140 bpm    Dist CCA  cm/sec    Dist CCA EDV 11 cm/sec    Prox ECA PSV 79 cm/sec    Dist ICA  cm/sec    Dist ICA EDV 11 cm/sec        Imaging:  [unfilled]    ASSESSMENT:  Carotid stenosis, asymptomatic, " bilateral  COPD  A-fib  CAD  GERD  Hx breast cancer  Anxiety  HTN       PLAN:  ICU management  Blood pressure management  S/p left endarterectomy with intraoperative shunting, bovine pericardial patch angioplasty and complex duplex  Bronchodilator  Inhaled corticosteroids  Hemodynamic support as needed  Electrolytes/ glycemic control  DVT and GI prophylaxis    Discussed with Dr Rigo Lara, APRN   4/14/2022  15:07 EDT      I personally have examined  and interviewed the patient. I have reviewed the history, data, problems, assessment and plan with our NP.  Critical care time in direct medical management (   ) minutes  Electronically signed by Iman Sierra MD. D, ABSM.

## 2022-04-15 LAB
ALBUMIN SERPL-MCNC: 3.6 G/DL (ref 3.5–5.2)
ALBUMIN/GLOB SERPL: 1.7 G/DL
ALP SERPL-CCNC: 69 U/L (ref 39–117)
ALT SERPL W P-5'-P-CCNC: 10 U/L (ref 1–33)
ANION GAP SERPL CALCULATED.3IONS-SCNC: 11 MMOL/L (ref 5–15)
AST SERPL-CCNC: 13 U/L (ref 1–32)
BASOPHILS # BLD AUTO: 0 10*3/MM3 (ref 0–0.2)
BASOPHILS NFR BLD AUTO: 0.4 % (ref 0–1.5)
BILIRUB SERPL-MCNC: 0.3 MG/DL (ref 0–1.2)
BUN SERPL-MCNC: 11 MG/DL (ref 8–23)
BUN/CREAT SERPL: 26.8 (ref 7–25)
CALCIUM SPEC-SCNC: 8.1 MG/DL (ref 8.6–10.5)
CHLORIDE SERPL-SCNC: 101 MMOL/L (ref 98–107)
CHOLEST SERPL-MCNC: 118 MG/DL (ref 0–200)
CO2 SERPL-SCNC: 24 MMOL/L (ref 22–29)
CREAT SERPL-MCNC: 0.41 MG/DL (ref 0.57–1)
DEPRECATED RDW RBC AUTO: 40.3 FL (ref 37–54)
EGFRCR SERPLBLD CKD-EPI 2021: 99.6 ML/MIN/1.73
EOSINOPHIL # BLD AUTO: 0 10*3/MM3 (ref 0–0.4)
EOSINOPHIL NFR BLD AUTO: 0.4 % (ref 0.3–6.2)
ERYTHROCYTE [DISTWIDTH] IN BLOOD BY AUTOMATED COUNT: 12.8 % (ref 12.3–15.4)
GLOBULIN UR ELPH-MCNC: 2.1 GM/DL
GLUCOSE SERPL-MCNC: 158 MG/DL (ref 65–99)
HCT VFR BLD AUTO: 36.6 % (ref 34–46.6)
HDLC SERPL-MCNC: 46 MG/DL (ref 40–60)
HGB BLD-MCNC: 12.3 G/DL (ref 12–15.9)
LDLC SERPL CALC-MCNC: 54 MG/DL (ref 0–100)
LDLC/HDLC SERPL: 1.15 {RATIO}
LYMPHOCYTES # BLD AUTO: 0.5 10*3/MM3 (ref 0.7–3.1)
LYMPHOCYTES NFR BLD AUTO: 4.9 % (ref 19.6–45.3)
MAGNESIUM SERPL-MCNC: 1.7 MG/DL (ref 1.6–2.4)
MCH RBC QN AUTO: 29.7 PG (ref 26.6–33)
MCHC RBC AUTO-ENTMCNC: 33.6 G/DL (ref 31.5–35.7)
MCV RBC AUTO: 88.5 FL (ref 79–97)
MONOCYTES # BLD AUTO: 0.7 10*3/MM3 (ref 0.1–0.9)
MONOCYTES NFR BLD AUTO: 6.9 % (ref 5–12)
NEUTROPHILS NFR BLD AUTO: 87.4 % (ref 42.7–76)
NEUTROPHILS NFR BLD AUTO: 9.2 10*3/MM3 (ref 1.7–7)
NRBC BLD AUTO-RTO: 0 /100 WBC (ref 0–0.2)
PHOSPHATE SERPL-MCNC: 3.8 MG/DL (ref 2.5–4.5)
PLATELET # BLD AUTO: 168 10*3/MM3 (ref 140–450)
PMV BLD AUTO: 8.3 FL (ref 6–12)
POTASSIUM SERPL-SCNC: 4.2 MMOL/L (ref 3.5–5.2)
PROT SERPL-MCNC: 5.7 G/DL (ref 6–8.5)
RBC # BLD AUTO: 4.13 10*6/MM3 (ref 3.77–5.28)
SODIUM SERPL-SCNC: 136 MMOL/L (ref 136–145)
TRIGL SERPL-MCNC: 96 MG/DL (ref 0–150)
VLDLC SERPL-MCNC: 18 MG/DL (ref 5–40)
WBC NRBC COR # BLD: 10.6 10*3/MM3 (ref 3.4–10.8)

## 2022-04-15 PROCEDURE — P9047 ALBUMIN (HUMAN), 25%, 50ML: HCPCS

## 2022-04-15 PROCEDURE — 25010000002 ALBUMIN HUMAN 25% PER 50 ML

## 2022-04-15 PROCEDURE — 94799 UNLISTED PULMONARY SVC/PX: CPT

## 2022-04-15 PROCEDURE — 85025 COMPLETE CBC W/AUTO DIFF WBC: CPT | Performed by: NURSE PRACTITIONER

## 2022-04-15 PROCEDURE — 83735 ASSAY OF MAGNESIUM: CPT | Performed by: NURSE PRACTITIONER

## 2022-04-15 PROCEDURE — 80061 LIPID PANEL: CPT | Performed by: NURSE PRACTITIONER

## 2022-04-15 PROCEDURE — 80053 COMPREHEN METABOLIC PANEL: CPT | Performed by: NURSE PRACTITIONER

## 2022-04-15 PROCEDURE — 84100 ASSAY OF PHOSPHORUS: CPT | Performed by: NURSE PRACTITIONER

## 2022-04-15 PROCEDURE — 25010000002 ENOXAPARIN PER 10 MG: Performed by: SURGERY

## 2022-04-15 RX ORDER — HYDRALAZINE HYDROCHLORIDE 25 MG/1
50 TABLET, FILM COATED ORAL EVERY 8 HOURS SCHEDULED
Status: DISCONTINUED | OUTPATIENT
Start: 2022-04-15 | End: 2022-04-15

## 2022-04-15 RX ORDER — ROSUVASTATIN CALCIUM 10 MG/1
20 TABLET, COATED ORAL DAILY
Status: DISCONTINUED | OUTPATIENT
Start: 2022-04-16 | End: 2022-04-16 | Stop reason: HOSPADM

## 2022-04-15 RX ORDER — HYDRALAZINE HYDROCHLORIDE 25 MG/1
50 TABLET, FILM COATED ORAL EVERY 8 HOURS SCHEDULED
Status: DISCONTINUED | OUTPATIENT
Start: 2022-04-15 | End: 2022-04-16 | Stop reason: HOSPADM

## 2022-04-15 RX ORDER — ROSUVASTATIN CALCIUM 10 MG/1
10 TABLET, COATED ORAL ONCE
Status: COMPLETED | OUTPATIENT
Start: 2022-04-15 | End: 2022-04-15

## 2022-04-15 RX ORDER — ALBUMIN (HUMAN) 12.5 G/50ML
25 SOLUTION INTRAVENOUS ONCE
Status: COMPLETED | OUTPATIENT
Start: 2022-04-15 | End: 2022-04-15

## 2022-04-15 RX ORDER — ALBUMIN (HUMAN) 12.5 G/50ML
SOLUTION INTRAVENOUS
Status: COMPLETED
Start: 2022-04-15 | End: 2022-04-15

## 2022-04-15 RX ADMIN — ASPIRIN 81 MG: 81 TABLET, COATED ORAL at 08:14

## 2022-04-15 RX ADMIN — Medication 10 ML: at 21:33

## 2022-04-15 RX ADMIN — NICARDIPINE HYDROCHLORIDE 7.5 MG/HR: 25 INJECTION, SOLUTION INTRAVENOUS at 15:12

## 2022-04-15 RX ADMIN — LOSARTAN POTASSIUM 75 MG: 25 TABLET, FILM COATED ORAL at 08:15

## 2022-04-15 RX ADMIN — NITROFURANTOIN (MONOHYDRATE/MACROCRYSTALS) 100 MG: 75; 25 CAPSULE ORAL at 21:32

## 2022-04-15 RX ADMIN — METOPROLOL SUCCINATE 25 MG: 25 TABLET, FILM COATED, EXTENDED RELEASE ORAL at 08:14

## 2022-04-15 RX ADMIN — SODIUM CHLORIDE, POTASSIUM CHLORIDE, SODIUM LACTATE AND CALCIUM CHLORIDE 50 ML/HR: 600; 310; 30; 20 INJECTION, SOLUTION INTRAVENOUS at 21:33

## 2022-04-15 RX ADMIN — TEMAZEPAM 15 MG: 15 CAPSULE ORAL at 21:33

## 2022-04-15 RX ADMIN — SENNOSIDES AND DOCUSATE SODIUM 2 TABLET: 50; 8.6 TABLET ORAL at 08:22

## 2022-04-15 RX ADMIN — ALBUMIN (HUMAN) 25 G: 12.5 SOLUTION INTRAVENOUS at 12:36

## 2022-04-15 RX ADMIN — NITROFURANTOIN (MONOHYDRATE/MACROCRYSTALS) 100 MG: 75; 25 CAPSULE ORAL at 08:15

## 2022-04-15 RX ADMIN — NICARDIPINE HYDROCHLORIDE 10 MG/HR: 25 INJECTION, SOLUTION INTRAVENOUS at 09:11

## 2022-04-15 RX ADMIN — ROSUVASTATIN 10 MG: 10 TABLET, FILM COATED ORAL at 12:30

## 2022-04-15 RX ADMIN — HYDROCODONE BITARTRATE AND ACETAMINOPHEN 1 TABLET: 5; 325 TABLET ORAL at 10:36

## 2022-04-15 RX ADMIN — PANTOPRAZOLE SODIUM 40 MG: 40 TABLET, DELAYED RELEASE ORAL at 08:14

## 2022-04-15 RX ADMIN — BUDESONIDE 0.5 MG: 0.5 INHALANT RESPIRATORY (INHALATION) at 20:19

## 2022-04-15 RX ADMIN — HYDRALAZINE HYDROCHLORIDE 50 MG: 25 TABLET, FILM COATED ORAL at 21:32

## 2022-04-15 RX ADMIN — ENOXAPARIN SODIUM 40 MG: 40 INJECTION SUBCUTANEOUS at 16:43

## 2022-04-15 RX ADMIN — IPRATROPIUM BROMIDE AND ALBUTEROL SULFATE 3 ML: .5; 3 SOLUTION RESPIRATORY (INHALATION) at 20:18

## 2022-04-15 RX ADMIN — ROSUVASTATIN 10 MG: 10 TABLET, FILM COATED ORAL at 08:14

## 2022-04-15 RX ADMIN — SENNOSIDES AND DOCUSATE SODIUM 2 TABLET: 50; 8.6 TABLET ORAL at 21:32

## 2022-04-15 RX ADMIN — ALBUMIN (HUMAN) 25 G: 0.25 INJECTION, SOLUTION INTRAVENOUS at 12:36

## 2022-04-15 RX ADMIN — HYDRALAZINE HYDROCHLORIDE 50 MG: 25 TABLET, FILM COATED ORAL at 10:36

## 2022-04-15 RX ADMIN — TEMAZEPAM 15 MG: 15 CAPSULE ORAL at 01:49

## 2022-04-15 RX ADMIN — NICARDIPINE HYDROCHLORIDE 5 MG/HR: 25 INJECTION, SOLUTION INTRAVENOUS at 05:55

## 2022-04-15 RX ADMIN — ESCITALOPRAM OXALATE 5 MG: 10 TABLET ORAL at 08:14

## 2022-04-15 RX ADMIN — Medication 10 ML: at 21:32

## 2022-04-15 RX ADMIN — Medication 10 ML: at 08:16

## 2022-04-15 NOTE — PROGRESS NOTES
Name: Elizabeth Whaley ADMIT: 2022   : 1940  PCP: Gerda Wadsworth MD    MRN: [id number] LOS: 1 days   AGE/SEX: 81 y.o. female  ROOM: [address] [place] [address] [place]    [place], [facility]    No chief complaint on file.    CC: carotid artery disease   Subjective     81 y.o. female s/p left carotid endarterectomy for high grade stenosis, asymptomatic disease. Doing well. Denies focal neurologic deficits.  No issues swallowing.  Denies headache.    Review of Systems   Constitutional: Negative for chills and fever.   Respiratory: Negative for shortness of breath.    Cardiovascular: Negative for chest pain.   Neurological: Negative.        Objective     Scheduled Medications:   aspirin, 81 mg, Oral, Daily  budesonide, 0.5 mg, Nebulization, BID - RT  enoxaparin, 40 mg, Subcutaneous, Daily  escitalopram, 5 mg, Oral, Daily  ipratropium-albuterol, 3 mL, Nebulization, BID - RT  losartan, 75 mg, Oral, Daily  metoprolol succinate XL, 25 mg, Oral, Daily  nitrofurantoin (macrocrystal-monohydrate), 100 mg, Oral, Q12H  pantoprazole, 40 mg, Oral, Daily  rosuvastatin, 10 mg, Oral, Daily  senna-docusate sodium, 2 tablet, Oral, BID  sodium chloride, 10 mL, Intravenous, Q12H  sodium chloride, 10 mL, Intravenous, Q12H        Active Infusions:  lactated ringers, 50 mL/hr, Last Rate: 50 mL/hr (22 1116)  niCARdipine, 2.5-15 mg/hr, Last Rate: 10 mg/hr (04/15/22 0911)        As Needed Medications:  •  acetaminophen **OR** acetaminophen  •  aluminum-magnesium hydroxide-simethicone  •  senna-docusate sodium **AND** polyethylene glycol **AND** bisacodyl **AND** bisacodyl  •  HYDROcodone-acetaminophen  •  labetalol  •  Morphine **AND** naloxone  •  nitroglycerin  •  ondansetron **OR** ondansetron  •  sodium chloride  •  sodium chloride  •  temazepam    Vital Signs  Vital Signs Patient Vitals for the past 24 hrs:   BP Temp Temp src Pulse Resp SpO2 Weight   04/15/22 0900 131/56 -- -- 76 -- 93 % --   04/15/22 0830 -- -- -- 63 --  -- --   04/15/22 0800 136/52 98.1 °F (36.7 °C) Oral 62 16 95 % --   04/15/22 0730 -- -- -- 62 -- 96 % --   04/15/22 0700 121/53 -- -- 64 -- 96 % --   04/15/22 0359 -- 98.1 °F (36.7 °C) Oral -- -- -- --   04/15/22 0234 -- -- -- -- -- -- 71.5 kg (157 lb 10.1 oz)   04/15/22 0118 167/52 -- -- 71 -- -- --   04/14/22 2310 -- 98.2 °F (36.8 °C) Oral 63 12 96 % --   04/14/22 2159 121/52 -- -- 70 -- -- --   04/14/22 2003 136/46 -- -- 65 -- -- --   04/14/22 2002 -- -- -- 76 14 96 % --   04/14/22 2000 -- 98.2 °F (36.8 °C) Oral 62 -- 98 % --   04/14/22 1959 -- -- -- 65 16 100 % --   04/14/22 1958 -- -- -- 64 16 99 % --   04/14/22 1955 -- -- -- 63 16 95 % --   04/14/22 1924 150/60 -- -- 68 -- -- --   04/14/22 1800 -- -- -- 64 -- 99 % --   04/14/22 1700 -- -- -- 66 -- 100 % --   04/14/22 1600 -- 97.7 °F (36.5 °C) Oral 70 -- 100 % --   04/14/22 1500 -- -- -- 62 -- 97 % --   04/14/22 1445 -- -- -- 64 -- 100 % --   04/14/22 1400 -- -- -- 67 -- 100 % --   04/14/22 1300 -- -- -- 60 -- 99 % --   04/14/22 1240 -- -- -- 60 -- 98 % --   04/14/22 1235 -- -- -- 59 -- 99 % --   04/14/22 1230 -- -- -- 65 -- 99 % --   04/14/22 1205 -- -- -- 60 -- 98 % --   04/14/22 1200 -- -- -- 59 -- 98 % --   04/14/22 1145 -- -- -- 61 -- 98 % --   04/14/22 1140 -- 97.8 °F (36.6 °C) Oral -- -- -- --   04/14/22 1120 -- -- -- 64 -- 99 % --   04/14/22 1050 138/51 -- -- 64 14 91 % --   04/14/22 1045 138/43 97.8 °F (36.6 °C) Tympanic 62 14 94 % --   04/14/22 1040 (!) 123/36 -- -- 63 13 98 % --   04/14/22 1035 141/48 -- -- 61 14 94 % --   04/14/22 1030 140/42 -- -- 60 14 94 % --   04/14/22 1025 141/42 -- -- 61 13 95 % --   04/14/22 1020 (!) 90/33 -- -- 61 13 96 % --   04/14/22 1015 (!) 109/30 -- -- 60 12 96 % --   04/14/22 1010 111/44 -- -- 60 12 -- --   04/14/22 1008 136/42 97.3 °F (36.3 °C) Tympanic 60 12 96 % --     I/O:  I/O last 3 completed shifts:  In: 2593 [P.O.:220; I.V.:2373]  Out: 300 [Urine:150; Blood:150]  BMI:  Body mass index is 28.83  kg/m².    Physical Exam:  Physical Exam  Constitutional:       General: She is not in acute distress.  Cardiovascular:      Rate and Rhythm: Normal rate.   Pulmonary:      Effort: Pulmonary effort is normal.   Skin:     Comments: Incision line is clean, dry and intact.  Mild ecchymosis.  No hematoma.   Neurological:      General: No focal deficit present.      Mental Status: She is alert and oriented to person, place, and time.      Motor: No weakness.      Comments: Tongue is midline.  Moving all 4 extremities without issue.  Speaking and alert.          Results Review:     CBC    Results from last 7 days   Lab Units 04/15/22  0355   WBC 10*3/mm3 10.60   HEMOGLOBIN g/dL 12.3   PLATELETS 10*3/mm3 168     BMP   Results from last 7 days   Lab Units 04/15/22  0355   SODIUM mmol/L 136   POTASSIUM mmol/L 4.2   CHLORIDE mmol/L 101   CO2 mmol/L 24.0   BUN mg/dL 11   CREATININE mg/dL 0.41*   GLUCOSE mg/dL 158*   MAGNESIUM mg/dL 1.7   PHOSPHORUS mg/dL 3.8     Coag     HbA1C No results found for: HGBA1C  Infection     Radiology(recent) No radiology results for the last day    Assessment/Plan     Assessment & Plan      Carotid stenosis, asymptomatic, bilateral      81 y.o. female POD #1 left carotid endarterectomy for asymptomatic high-grade stenosis.  Patient tolerated surgery well.  No focal neurologic deficits.  Incision line clean dry and intact, no hematoma.Patient is currently on Cardene, RN was administering oral antihypertensives.We will keep the art line in place until blood pressure is controlled and weaned off Cardene. <150 Goal SBP. Okay to sit at bedside. Advance diet as tolerated.Discussed once BP is controlled can be discharged.     Arlen Kim PA-C  04/15/22  09:25 EDT    Please call my office with any question: [phone]    Active Hospital Problems    Diagnosis  POA   • Carotid stenosis, asymptomatic, bilateral [I65.23]  Yes      Resolved Hospital Problems   No resolved problems to display.

## 2022-04-15 NOTE — PLAN OF CARE
Goal Outcome Evaluation:     Patient ok to be up to chair with systolic bp <130 per vascular surgery

## 2022-04-15 NOTE — PROGRESS NOTES
"PULMONARY CRITICAL CARE Progress  NOTE      PATIENT IDENTIFICATION:  Name: Elizabeth Whaley  MRN: [id number]  :  1940     Age: 81 y.o.  Sex: female    DATE OF Note:  4/15/2022   Referring Physician: Dilshad Domingo MD                  Subjective:   Feeling better, no new issue, no SOB no chest pain, no nausea or vomiting, no change in bowel habit, no dysuria,  no new  skin rash or itching.      Objective:  tMax 24 hrs: Temp (24hrs), Av.9 °F (36.6 °C), Min:97.3 °F (36.3 °C), Max:98.2 °F (36.8 °C)      Vitals Ranges:   Temp:  [97.3 °F (36.3 °C)-98.2 °F (36.8 °C)] 98.1 °F (36.7 °C)  Heart Rate:  [59-76] 76  Resp:  [12-16] 16  BP: ()/(30-60) 131/56  Arterial Line BP: (132-190)/(36-65) 154/43    Intake and Output Last 3 Shifts:   I/O last 3 completed shifts:  In: 2593 [P.O.:220; I.V.:2373]  Out: 300 [Urine:150; Blood:150]    Exam:  /56   Pulse 76   Temp 98.1 °F (36.7 °C) (Oral)   Resp 16   Ht 157.5 cm (62\")   Wt 71.5 kg (157 lb 10.1 oz)   SpO2 93%   BMI 28.83 kg/m²     General Appearance:     HEENT:  Normocephalic, without obvious abnormality, Conjunctiva/corneas clear,.  Normal external ear canals, Nares normal, no drainage     Neck:  Supple, symmetrical, trachea midline. No JVD.  Lungs /Chest wall:   Bilateral basal rhonchi, respirations unlabored symmetrical wall movement.     Heart:  Regular rate and rhythm, systolic murmur PMI left sternal border  Abdomen: Soft, non-tender, no masses, no organomegaly.    Extremities: Trace edema no clubbing or Cyanosis        Medications:    Current Facility-Administered Medications:   •  acetaminophen (TYLENOL) tablet 650 mg, 650 mg, Oral, Q4H PRN **OR** acetaminophen (TYLENOL) suppository 650 mg, 650 mg, Rectal, Q4H PRN, Paresh Calero APRN  •  aluminum-magnesium hydroxide-simethicone (MAALOX MAX) 400-400-40 MG/5ML suspension 15 mL, 15 mL, Oral, Q6H PRN, Paresh Calero APRN  •  aspirin EC tablet 81 mg, 81 mg, Oral, Daily, Dilshad Domingo MD, " 81 mg at 04/15/22 0814  •  sennosides-docusate (PERICOLACE) 8.6-50 MG per tablet 2 tablet, 2 tablet, Oral, BID, 2 tablet at 04/15/22 0822 **AND** polyethylene glycol (MIRALAX) packet 17 g, 17 g, Oral, Daily PRN **AND** bisacodyl (DULCOLAX) EC tablet 5 mg, 5 mg, Oral, Daily PRN **AND** bisacodyl (DULCOLAX) suppository 10 mg, 10 mg, Rectal, Daily PRN, Paresh Calero APRN  •  budesonide (PULMICORT) nebulizer solution 0.5 mg, 0.5 mg, Nebulization, BID - RT, Peg Lara APRN, 0.5 mg at 04/14/22 1955  •  enoxaparin (LOVENOX) syringe 40 mg, 40 mg, Subcutaneous, Daily, Dilshad Domingo MD  •  escitalopram (LEXAPRO) tablet 5 mg, 5 mg, Oral, Daily, Dilshad Domingo MD, 5 mg at 04/15/22 0814  •  HYDROcodone-acetaminophen (NORCO) 5-325 MG per tablet 1 tablet, 1 tablet, Oral, Q4H PRN, Dilshad Domingo MD, 1 tablet at 04/14/22 1410  •  ipratropium-albuterol (DUO-NEB) nebulizer solution 3 mL, 3 mL, Nebulization, BID - RT, Iman Sierra MD, 3 mL at 04/14/22 1959  •  labetalol (NORMODYNE,TRANDATE) injection 10 mg, 10 mg, Intravenous, Q4H PRN, Paresh Calero APRN, 10 mg at 04/14/22 1233  •  lactated ringers infusion, 50 mL/hr, Intravenous, Continuous, Dilshad Domingo MD, Last Rate: 50 mL/hr at 04/14/22 1116, 50 mL/hr at 04/14/22 1116  •  losartan (COZAAR) tablet 75 mg, 75 mg, Oral, Daily, Dilshad Domingo MD, 75 mg at 04/15/22 0815  •  metoprolol succinate XL (TOPROL-XL) 24 hr tablet 25 mg, 25 mg, Oral, Daily, Dlishad Domingo MD, 25 mg at 04/15/22 0814  •  Morphine sulfate (PF) injection 2 mg, 2 mg, Intravenous, Q4H PRN, 2 mg at 04/14/22 2100 **AND** naloxone (NARCAN) injection 0.4 mg, 0.4 mg, Intravenous, Q5 Min PRN, Dilshad Domingo MD  •  niCARdipine (CARDENE) 25mg in 250mL NS infusion, 2.5-15 mg/hr, Intravenous, Titrated, Paresh Calero APRN, Last Rate: 100 mL/hr at 04/15/22 0911, 10 mg/hr at 04/15/22 0911  •  nitrofurantoin (macrocrystal-monohydrate) (MACROBID) capsule 100 mg, 100 mg, Oral, Q12H, Jose L  Dilshad STEEN MD, 100 mg at 04/15/22 0815  •  nitroglycerin (NITROSTAT) SL tablet 0.4 mg, 0.4 mg, Sublingual, Q5 Min PRN, Paresh Calero APRN  •  ondansetron (ZOFRAN) tablet 4 mg, 4 mg, Oral, Q6H PRN **OR** ondansetron (ZOFRAN) injection 4 mg, 4 mg, Intravenous, Q6H PRN, Dilshad Domingo MD, 4 mg at 04/14/22 1158  •  pantoprazole (PROTONIX) EC tablet 40 mg, 40 mg, Oral, Daily, Dilshad Domingo MD, 40 mg at 04/15/22 0814  •  rosuvastatin (CRESTOR) tablet 10 mg, 10 mg, Oral, Daily, Dilshad Domingo MD, 10 mg at 04/15/22 0814  •  sodium chloride 0.9 % flush 10 mL, 10 mL, Intravenous, Q12H, Dilshad Domingo MD, 10 mL at 04/15/22 0816  •  sodium chloride 0.9 % flush 10 mL, 10 mL, Intravenous, PRN, Dilshad Domigno MD  •  sodium chloride 0.9 % flush 10 mL, 10 mL, Intravenous, Q12H, Paresh Calero APRN, 10 mL at 04/15/22 0816  •  sodium chloride 0.9 % flush 10 mL, 10 mL, Intravenous, PRN, Paresh Calero APRN  •  temazepam (RESTORIL) capsule 15 mg, 15 mg, Oral, Nightly PRN, Dilshad Domingo MD, 15 mg at 04/15/22 0149    Data Review:  All labs (24hrs):   Recent Results (from the past 24 hour(s))   Lipid Panel    Collection Time: 04/15/22  3:55 AM    Specimen: Blood   Result Value Ref Range    Total Cholesterol 118 0 - 200 mg/dL    Triglycerides 96 0 - 150 mg/dL    HDL Cholesterol 46 40 - 60 mg/dL    LDL Cholesterol  54 0 - 100 mg/dL    VLDL Cholesterol 18 5 - 40 mg/dL    LDL/HDL Ratio 1.15    Magnesium    Collection Time: 04/15/22  3:55 AM    Specimen: Blood   Result Value Ref Range    Magnesium 1.7 1.6 - 2.4 mg/dL   Phosphorus    Collection Time: 04/15/22  3:55 AM    Specimen: Blood   Result Value Ref Range    Phosphorus 3.8 2.5 - 4.5 mg/dL   Comprehensive Metabolic Panel    Collection Time: 04/15/22  3:55 AM    Specimen: Blood   Result Value Ref Range    Glucose 158 (H) 65 - 99 mg/dL    BUN 11 8 - 23 mg/dL    Creatinine 0.41 (L) 0.57 - 1.00 mg/dL    Sodium 136 136 - 145 mmol/L    Potassium 4.2 3.5 - 5.2 mmol/L     Chloride 101 98 - 107 mmol/L    CO2 24.0 22.0 - 29.0 mmol/L    Calcium 8.1 (L) 8.6 - 10.5 mg/dL    Total Protein 5.7 (L) 6.0 - 8.5 g/dL    Albumin 3.60 3.50 - 5.20 g/dL    ALT (SGPT) 10 1 - 33 U/L    AST (SGOT) 13 1 - 32 U/L    Alkaline Phosphatase 69 39 - 117 U/L    Total Bilirubin 0.3 0.0 - 1.2 mg/dL    Globulin 2.1 gm/dL    A/G Ratio 1.7 g/dL    BUN/Creatinine Ratio 26.8 (H) 7.0 - 25.0    Anion Gap 11.0 5.0 - 15.0 mmol/L    eGFR 99.6 >60.0 mL/min/1.73   CBC Auto Differential    Collection Time: 04/15/22  3:55 AM    Specimen: Blood   Result Value Ref Range    WBC 10.60 3.40 - 10.80 10*3/mm3    RBC 4.13 3.77 - 5.28 10*6/mm3    Hemoglobin 12.3 12.0 - 15.9 g/dL    Hematocrit 36.6 34.0 - 46.6 %    MCV 88.5 79.0 - 97.0 fL    MCH 29.7 26.6 - 33.0 pg    MCHC 33.6 31.5 - 35.7 g/dL    RDW 12.8 12.3 - 15.4 %    RDW-SD 40.3 37.0 - 54.0 fl    MPV 8.3 6.0 - 12.0 fL    Platelets 168 140 - 450 10*3/mm3    Neutrophil % 87.4 (H) 42.7 - 76.0 %    Lymphocyte % 4.9 (L) 19.6 - 45.3 %    Monocyte % 6.9 5.0 - 12.0 %    Eosinophil % 0.4 0.3 - 6.2 %    Basophil % 0.4 0.0 - 1.5 %    Neutrophils, Absolute 9.20 (H) 1.70 - 7.00 10*3/mm3    Lymphocytes, Absolute 0.50 (L) 0.70 - 3.10 10*3/mm3    Monocytes, Absolute 0.70 0.10 - 0.90 10*3/mm3    Eosinophils, Absolute 0.00 0.00 - 0.40 10*3/mm3    Basophils, Absolute 0.00 0.00 - 0.20 10*3/mm3    nRBC 0.0 0.0 - 0.2 /100 WBC        Imaging:  Intra-Op Duplex Carotid Left Lmt CAR  · Imaging indicates a normal intra-op exam.     Arterial Line  Anish De La Fuente CAA     4/14/2022  8:20 AM  Arterial Line    Patient location during procedure: OR  Start time: 4/14/2022 7:45 AM  Stop Time:4/14/2022 7:47 AM       Line placed for hemodynamic monitoring and ABGs/Labs/ISTAT.  Performed By   Anesthesiologist: Sam Neal MD  Preanesthetic Checklist  Completed: patient identified, IV checked, site marked, risks and benefits   discussed, surgical consent, monitors and equipment checked, pre-op   evaluation  and timeout performed  Arterial Line Prep   Sterile Tech: cap, gloves and mask  Prep: ChloraPrep  Patient monitoring: blood pressure monitoring, continuous pulse oximetry   and EKG  Arterial Line Procedure   Laterality:right  Location:  radial artery  Catheter size: 20 G   Guidance: landmark technique and palpation technique  Number of attempts: 1  Successful placement: yes  Heparin (Porcine) in NaCl 1000-0.9 UT/500ML-% for arterial line pressure   bag, 1,000 Units  Post Assessment   Dressing Type: secured with tape and wrist guard applied.   Complications no  Circ/Move/Sens Assessment: normal.   Patient Tolerance: patient tolerated the procedure well with no apparent   complications       ASSESSMENT:  Carotid stenosis, asymptomatic, bilateral  COPD  A-fib  CAD  GERD  Hx breast cancer  Anxiety  HTN        PLAN:  ICU management  Blood pressure management   antiplatelets   Bronchodilator  Inhaled corticosteroids  Hemodynamic support as needed  Electrolytes/ glycemic control  DVT and GI prophylaxis    Total Critical care time in direct medical management (   ) minutes, This time specifically excludes time spent performing procedures.  Iman Sierra MD. D, ABSM.     4/15/2022  09:47 EDT

## 2022-04-15 NOTE — CASE MANAGEMENT/SOCIAL WORK
Discharge Planning Assessment  [facility]     Patient Name: Elizabeth Whaley  MRN: [id number]  Today's Date: 4/15/2022    Admit Date: 4/14/2022     Discharge Needs Assessment     Row Name 04/15/22 1421       Living Environment    People in Home alone    Current Living Arrangements home    Primary Care Provided by self    Provides Primary Care For no one    Family Caregiver if Needed child(vonda), adult    Family Caregiver Names Daughters Georgia and Nicol    Quality of Family Relationships helpful;involved;supportive    Able to Return to Prior Arrangements yes       Resource/Environmental Concerns    Resource/Environmental Concerns none    Transportation Concerns none       Transition Planning    Patient/Family Anticipates Transition to home;home with family    Patient/Family Anticipated Services at Transition none    Transportation Anticipated family or friend will provide       Discharge Needs Assessment    Readmission Within the Last 30 Days no previous admission in last 30 days    Equipment Currently Used at Home glucometer;bp cuff  bought privately    Concerns to be Addressed denies needs/concerns at this time    Anticipated Changes Related to Illness none    Equipment Needed After Discharge none    Provided Post Acute Provider List? N/A    Provided Post Acute Provider Quality & Resource List? N/A    Current Discharge Risk lives alone               Discharge Plan     Row Name 04/15/22 1423       Plan    Plan DC Plan: Anticicpate Routine Home with family    Provided Post Acute Provider List? N/A    Provided Post Acute Provider Quality & Resource List? N/A    Patient/Family in Agreement with Plan yes    Plan Comments CM met with patient at bedside to discuss admission assessment and discharge planning. Patient confirms PCP and pharmacy. Patient denies any difficulty affording medications. Patient denies any need for additional services or DME at this time. Patient anticipates discharging from [facility] today 4/15/22.[facility]  will continue to follow for developing needs and adjust discharge plan accordingly. DC Barrier: Cardene Gtt               Demographic Summary     Row Name 04/15/22 1420       General Information    Admission Type inpatient    Arrived From home;operating room    Required Notices Provided Important Message from Medicare  Delivered per registration    Referral Source admission list    Reason for Consult discharge planning    Preferred Language English       Contact Information    Permission Granted to Share Info With                Functional Status     Row Name 04/15/22 1421       Functional Status    Usual Activity Tolerance excellent    Current Activity Tolerance good       Functional Status, IADL    Medications independent    Meal Preparation independent    Housekeeping independent    Laundry independent    Shopping independent       Mental Status    General Appearance WDL WDL       Mental Status Summary    Recent Changes in Mental Status/Cognitive Functioning no changes       Employment/    Employment Status employed part-time    Current or Previous Occupation service industry    Employment/ Comments [employer]              Met with patient in room wearing PPE: mask,    Maintain distance greater than six feet and spent less than fifteen minutes in the room.      Violeta Dickens, RN     Office Phone: [phone]  Office Cell:     [phone]

## 2022-04-16 ENCOUNTER — APPOINTMENT (OUTPATIENT)
Dept: GENERAL RADIOLOGY | Facility: HOSPITAL | Age: 82
End: 2022-04-16

## 2022-04-16 ENCOUNTER — READMISSION MANAGEMENT (OUTPATIENT)
Dept: CALL CENTER | Facility: HOSPITAL | Age: 82
End: 2022-04-16

## 2022-04-16 VITALS
OXYGEN SATURATION: 93 % | HEART RATE: 78 BPM | RESPIRATION RATE: 17 BRPM | DIASTOLIC BLOOD PRESSURE: 66 MMHG | HEIGHT: 62 IN | TEMPERATURE: 99 F | SYSTOLIC BLOOD PRESSURE: 169 MMHG | WEIGHT: 162.04 LBS | BODY MASS INDEX: 29.82 KG/M2

## 2022-04-16 LAB
ALBUMIN SERPL-MCNC: 3.6 G/DL (ref 3.5–5.2)
ALBUMIN/GLOB SERPL: 1.6 G/DL
ALP SERPL-CCNC: 64 U/L (ref 39–117)
ALT SERPL W P-5'-P-CCNC: 9 U/L (ref 1–33)
ANION GAP SERPL CALCULATED.3IONS-SCNC: 10 MMOL/L (ref 5–15)
AST SERPL-CCNC: 12 U/L (ref 1–32)
BASOPHILS # BLD AUTO: 0.1 10*3/MM3 (ref 0–0.2)
BASOPHILS NFR BLD AUTO: 0.6 % (ref 0–1.5)
BILIRUB SERPL-MCNC: 0.4 MG/DL (ref 0–1.2)
BUN SERPL-MCNC: 12 MG/DL (ref 8–23)
BUN/CREAT SERPL: 26.7 (ref 7–25)
CALCIUM SPEC-SCNC: 8.9 MG/DL (ref 8.6–10.5)
CHLORIDE SERPL-SCNC: 104 MMOL/L (ref 98–107)
CO2 SERPL-SCNC: 26 MMOL/L (ref 22–29)
CREAT SERPL-MCNC: 0.45 MG/DL (ref 0.57–1)
DEPRECATED RDW RBC AUTO: 41.1 FL (ref 37–54)
EGFRCR SERPLBLD CKD-EPI 2021: 97.4 ML/MIN/1.73
EOSINOPHIL # BLD AUTO: 0 10*3/MM3 (ref 0–0.4)
EOSINOPHIL NFR BLD AUTO: 0.3 % (ref 0.3–6.2)
ERYTHROCYTE [DISTWIDTH] IN BLOOD BY AUTOMATED COUNT: 13.2 % (ref 12.3–15.4)
GLOBULIN UR ELPH-MCNC: 2.2 GM/DL
GLUCOSE SERPL-MCNC: 108 MG/DL (ref 65–99)
HCT VFR BLD AUTO: 36.9 % (ref 34–46.6)
HGB BLD-MCNC: 12.3 G/DL (ref 12–15.9)
LYMPHOCYTES # BLD AUTO: 0.8 10*3/MM3 (ref 0.7–3.1)
LYMPHOCYTES NFR BLD AUTO: 8 % (ref 19.6–45.3)
MAGNESIUM SERPL-MCNC: 1.8 MG/DL (ref 1.6–2.4)
MCH RBC QN AUTO: 29.7 PG (ref 26.6–33)
MCHC RBC AUTO-ENTMCNC: 33.2 G/DL (ref 31.5–35.7)
MCV RBC AUTO: 89.3 FL (ref 79–97)
MONOCYTES # BLD AUTO: 0.6 10*3/MM3 (ref 0.1–0.9)
MONOCYTES NFR BLD AUTO: 6.4 % (ref 5–12)
NEUTROPHILS NFR BLD AUTO: 8.1 10*3/MM3 (ref 1.7–7)
NEUTROPHILS NFR BLD AUTO: 84.7 % (ref 42.7–76)
NRBC BLD AUTO-RTO: 0 /100 WBC (ref 0–0.2)
PHOSPHATE SERPL-MCNC: 3 MG/DL (ref 2.5–4.5)
PLATELET # BLD AUTO: 166 10*3/MM3 (ref 140–450)
PMV BLD AUTO: 8.7 FL (ref 6–12)
POTASSIUM SERPL-SCNC: 4.1 MMOL/L (ref 3.5–5.2)
PROT SERPL-MCNC: 5.8 G/DL (ref 6–8.5)
RBC # BLD AUTO: 4.13 10*6/MM3 (ref 3.77–5.28)
SODIUM SERPL-SCNC: 140 MMOL/L (ref 136–145)
WBC NRBC COR # BLD: 9.6 10*3/MM3 (ref 3.4–10.8)

## 2022-04-16 PROCEDURE — 80053 COMPREHEN METABOLIC PANEL: CPT | Performed by: NURSE PRACTITIONER

## 2022-04-16 PROCEDURE — 83735 ASSAY OF MAGNESIUM: CPT | Performed by: NURSE PRACTITIONER

## 2022-04-16 PROCEDURE — 63710000001 ONDANSETRON PER 8 MG: Performed by: SURGERY

## 2022-04-16 PROCEDURE — 71045 X-RAY EXAM CHEST 1 VIEW: CPT

## 2022-04-16 PROCEDURE — 85025 COMPLETE CBC W/AUTO DIFF WBC: CPT | Performed by: NURSE PRACTITIONER

## 2022-04-16 PROCEDURE — 84100 ASSAY OF PHOSPHORUS: CPT | Performed by: NURSE PRACTITIONER

## 2022-04-16 PROCEDURE — 99239 HOSP IP/OBS DSCHRG MGMT >30: CPT | Performed by: NURSE PRACTITIONER

## 2022-04-16 RX ORDER — HYDRALAZINE HYDROCHLORIDE 50 MG/1
50 TABLET, FILM COATED ORAL EVERY 8 HOURS SCHEDULED
Qty: 90 TABLET | Refills: 0 | Status: SHIPPED | OUTPATIENT
Start: 2022-04-16 | End: 2022-06-10

## 2022-04-16 RX ORDER — DEXMEDETOMIDINE HYDROCHLORIDE 4 UG/ML
.2-1.5 INJECTION, SOLUTION INTRAVENOUS
Status: DISCONTINUED | OUTPATIENT
Start: 2022-04-16 | End: 2022-04-16

## 2022-04-16 RX ORDER — IPRATROPIUM BROMIDE AND ALBUTEROL SULFATE 2.5; .5 MG/3ML; MG/3ML
3 SOLUTION RESPIRATORY (INHALATION) EVERY 6 HOURS PRN
Status: DISCONTINUED | OUTPATIENT
Start: 2022-04-16 | End: 2022-04-16 | Stop reason: HOSPADM

## 2022-04-16 RX ADMIN — ESCITALOPRAM OXALATE 5 MG: 10 TABLET ORAL at 07:57

## 2022-04-16 RX ADMIN — Medication 10 ML: at 08:00

## 2022-04-16 RX ADMIN — HYDROCODONE BITARTRATE AND ACETAMINOPHEN 1 TABLET: 5; 325 TABLET ORAL at 03:23

## 2022-04-16 RX ADMIN — HYDRALAZINE HYDROCHLORIDE 50 MG: 25 TABLET, FILM COATED ORAL at 11:38

## 2022-04-16 RX ADMIN — LOSARTAN POTASSIUM 75 MG: 25 TABLET, FILM COATED ORAL at 07:58

## 2022-04-16 RX ADMIN — METOPROLOL SUCCINATE 25 MG: 25 TABLET, FILM COATED, EXTENDED RELEASE ORAL at 07:57

## 2022-04-16 RX ADMIN — PANTOPRAZOLE SODIUM 40 MG: 40 TABLET, DELAYED RELEASE ORAL at 07:58

## 2022-04-16 RX ADMIN — ASPIRIN 81 MG: 81 TABLET, COATED ORAL at 07:58

## 2022-04-16 RX ADMIN — ROSUVASTATIN 20 MG: 10 TABLET, FILM COATED ORAL at 07:59

## 2022-04-16 RX ADMIN — HYDROCODONE BITARTRATE AND ACETAMINOPHEN 1 TABLET: 5; 325 TABLET ORAL at 07:58

## 2022-04-16 NOTE — DISCHARGE SUMMARY
PULMONARY/CRITICAL CARE DISCHARGE SUMMARY NOTE           PATIENT NAME:  Elizabeth Whaley             :  1940            MRN:  [id number]    DATE OF ADMISSION: 2022     DATE OF DISCHARGE: 2022    DISCHARGE DIAGNOSES:  High-grade carotid stenosis  Nocturnal hypoxia with known history of sleep apnea  Atrial fibrillation  Anxiety  Coronary artery disease s/p CABG  Hypertension  Hyperlipidemia  History of tobacco abuse, 1.5 packs/day x 50 years.  Quit smoking 2015  Lung nodule      HOSPITAL COURSE  The patient is an 81-year-old female who was admitted on 2022 for scheduled, elective surgery.  She had been followed by vascular surgery in the outpatient setting.  CT angiogram of the neck revealed high-grade carotid stenosis, > 80%, and heavily calcified.  Surgical intervention was recommended and on 2022 the patient underwent a left carotid endarterectomy.  The case was reportedly unexceptional and the patient was awakened easily following the case and exhibited no neuro deficits.  She was admitted to the ICU for close postoperative management and neurovascular checks.  Postoperatively she had some issues with hypertension and required a Cardene infusion while p.o. medications were being restarted.  The patient's diet was advanced with no issues and no dysphagia.  The patient's left lateral neck incision revealed some ecchymosis however was well approximated and no hematoma.  On the night of 4/15 into  the patient was noted to have some hypoxia while sleeping with a known history of obstructive sleep apnea.  The patient reports that she does not use CPAP due to intolerance with the mask.  Hypoxia resolved with awakening the patient.  She states that she would decline follow-up for sleep study and CPAP recommendations and reports that she has a dental device that works in its place.  Today the patient's blood pressure control has improved with p.o. medications.  She has had no neurological  deficits.  She is eating well.  She is stable for discharge home with appropriate follow-up with vascular surgery.    Of note the patient has a known/documented history of pulmonary nodule which was redemonstrated on CT.  The patient reports that she has serial follow-up with Dr. Navarrete within the [facility] system.    PROCEDURES PERFORMED    Procedure(s):  LEFT CAROTID ENDARTERECTOMY  -------------------       LABS/RADIOLOGICAL STUDIES  Lab Results (last 72 hours)       Procedure Component Value Units Date/Time    Phosphorus [[id number]]  (Normal) Collected: 04/16/22 0436    Specimen: Blood Updated: 04/16/22 0502     Phosphorus 3.0 mg/dL     Comprehensive Metabolic Panel [[id number]]  (Abnormal) Collected: 04/16/22 0436    Specimen: Blood Updated: 04/16/22 0502     Glucose 108 mg/dL      BUN 12 mg/dL      Creatinine 0.45 mg/dL      Sodium 140 mmol/L      Potassium 4.1 mmol/L      Chloride 104 mmol/L      CO2 26.0 mmol/L      Calcium 8.9 mg/dL      Total Protein 5.8 g/dL      Albumin 3.60 g/dL      ALT (SGPT) 9 U/L      AST (SGOT) 12 U/L      Alkaline Phosphatase 64 U/L      Total Bilirubin 0.4 mg/dL      Globulin 2.2 gm/dL      A/G Ratio 1.6 g/dL      BUN/Creatinine Ratio 26.7     Anion Gap 10.0 mmol/L      eGFR 97.4 mL/min/1.73      Comment: National Kidney Foundation and American Society of Nephrology (ASN) Task Force recommended calculation based on the Chronic Kidney Disease Epidemiology Collaboration (CKD-EPI) equation refit without adjustment for race.       Narrative:      GFR Normal >60  Chronic Kidney Disease <60  Kidney Failure <15      Magnesium [[id number]]  (Normal) Collected: 04/16/22 0436    Specimen: Blood Updated: 04/16/22 0502     Magnesium 1.8 mg/dL     CBC & Differential [[id number]]  (Abnormal) Collected: 04/16/22 0436    Specimen: Blood Updated: 04/16/22 0444    Narrative:      The following orders were created for panel order CBC & Differential.  Procedure                               Abnormality          Status                     ---------                               -----------         ------                     CBC Auto Differential[[id number]]        Abnormal            Final result                 Please view results for these tests on the individual orders.    CBC Auto Differential [[id number]]  (Abnormal) Collected: 04/16/22 0436    Specimen: Blood Updated: 04/16/22 0444     WBC 9.60 10*3/mm3      RBC 4.13 10*6/mm3      Hemoglobin 12.3 g/dL      Hematocrit 36.9 %      MCV 89.3 fL      MCH 29.7 pg      MCHC 33.2 g/dL      RDW 13.2 %      RDW-SD 41.1 fl      MPV 8.7 fL      Platelets 166 10*3/mm3      Neutrophil % 84.7 %      Lymphocyte % 8.0 %      Monocyte % 6.4 %      Eosinophil % 0.3 %      Basophil % 0.6 %      Neutrophils, Absolute 8.10 10*3/mm3      Lymphocytes, Absolute 0.80 10*3/mm3      Monocytes, Absolute 0.60 10*3/mm3      Eosinophils, Absolute 0.00 10*3/mm3      Basophils, Absolute 0.10 10*3/mm3      nRBC 0.0 /100 WBC     Comprehensive Metabolic Panel [[id number]]  (Abnormal) Collected: 04/15/22 0355    Specimen: Blood Updated: 04/15/22 0442     Glucose 158 mg/dL      BUN 11 mg/dL      Creatinine 0.41 mg/dL      Sodium 136 mmol/L      Potassium 4.2 mmol/L      Chloride 101 mmol/L      CO2 24.0 mmol/L      Calcium 8.1 mg/dL      Total Protein 5.7 g/dL      Albumin 3.60 g/dL      ALT (SGPT) 10 U/L      AST (SGOT) 13 U/L      Alkaline Phosphatase 69 U/L      Total Bilirubin 0.3 mg/dL      Globulin 2.1 gm/dL      A/G Ratio 1.7 g/dL      BUN/Creatinine Ratio 26.8     Anion Gap 11.0 mmol/L      eGFR 99.6 mL/min/1.73      Comment: National Kidney Foundation and American Society of Nephrology (ASN) Task Force recommended calculation based on the Chronic Kidney Disease Epidemiology Collaboration (CKD-EPI) equation refit without adjustment for race.       Narrative:      GFR Normal >60  Chronic Kidney Disease <60  Kidney Failure <15      Magnesium [[id number]]  (Normal) Collected: 04/15/22 0355     Specimen: Blood Updated: 04/15/22 0442     Magnesium 1.7 mg/dL     Phosphorus [[id number]]  (Normal) Collected: 04/15/22 0355    Specimen: Blood Updated: 04/15/22 0442     Phosphorus 3.8 mg/dL     Lipid Panel [[id number]] Collected: 04/15/22 0355    Specimen: Blood Updated: 04/15/22 0442     Total Cholesterol 118 mg/dL      Triglycerides 96 mg/dL      HDL Cholesterol 46 mg/dL      LDL Cholesterol  54 mg/dL      VLDL Cholesterol 18 mg/dL      LDL/HDL Ratio 1.15    Narrative:      Cholesterol Reference Ranges  (U.S. Department of Health and Human Services ATP III Classifications)    Desirable          <200 mg/dL  Borderline High    200-239 mg/dL  High Risk          >240 mg/dL      Triglyceride Reference Ranges  (U.S. Department of Health and Human Services ATP III Classifications)    Normal           <150 mg/dL  Borderline High  150-199 mg/dL  High             200-499 mg/dL  Very High        >500 mg/dL    HDL Reference Ranges  (U.S. Department of Health and Human Services ATP III Classifications)    Low     <40 mg/dl (major risk factor for CHD)  High    >60 mg/dl ('negative' risk factor for CHD)        LDL Reference Ranges  (U.S. Department of Health and Human Services ATP III Classifications)    Optimal          <100 mg/dL  Near Optimal     100-129 mg/dL  Borderline High  130-159 mg/dL  High             160-189 mg/dL  Very High        >189 mg/dL    CBC & Differential [[id number]]  (Abnormal) Collected: 04/15/22 0355    Specimen: Blood Updated: 04/15/22 0401    Narrative:      The following orders were created for panel order CBC & Differential.  Procedure                               Abnormality         Status                     ---------                               -----------         ------                     CBC Auto Differential[[id number]]        Abnormal            Final result                 Please view results for these tests on the individual orders.    CBC Auto Differential [[id number]]  (Abnormal)  Collected: 04/15/22 0355    Specimen: Blood Updated: 04/15/22 0401     WBC 10.60 10*3/mm3      RBC 4.13 10*6/mm3      Hemoglobin 12.3 g/dL      Hematocrit 36.6 %      MCV 88.5 fL      MCH 29.7 pg      MCHC 33.6 g/dL      RDW 12.8 %      RDW-SD 40.3 fl      MPV 8.3 fL      Platelets 168 10*3/mm3      Neutrophil % 87.4 %      Lymphocyte % 4.9 %      Monocyte % 6.9 %      Eosinophil % 0.4 %      Basophil % 0.4 %      Neutrophils, Absolute 9.20 10*3/mm3      Lymphocytes, Absolute 0.50 10*3/mm3      Monocytes, Absolute 0.70 10*3/mm3      Eosinophils, Absolute 0.00 10*3/mm3      Basophils, Absolute 0.00 10*3/mm3      nRBC 0.0 /100 WBC     POC Activated Clotting Time [[id number]]  (Abnormal) Collected: 04/14/22 0918    Specimen: Arterial Blood Updated: 04/14/22 1007     Activated Clotting Time  255 Seconds      Comment: Serial Number: [id number]:  [id number]       POC Activated Clotting Time [[id number]]  (Abnormal) Collected: 04/14/22 0840    Specimen: Arterial Blood Updated: 04/14/22 1007     Activated Clotting Time  327 Seconds      Comment: Serial Number: [id number]:  [id number]       POC Activated Clotting Time [[id number]]  (Abnormal) Collected: 04/14/22 0809    Specimen: Arterial Blood Updated: 04/14/22 1007     Activated Clotting Time  148 Seconds      Comment: Serial Number: [id number]:  [id number]               XR Chest 1 View    Result Date: 4/16/2022  Mild blunting the costophrenic angles. This could be seen with small pleural effusions. Cardiomegaly and stable scarring lung bases.  Electronically Signed By-Cathie Grissom MD On:4/16/2022 11:22 AM This report was finalized on [id number] by  Cathie Grissom MD.    CT Angiogram Carotids    Result Date: 3/26/2022   1. 64% stenosis within the right proximal ICA 2. A percent stenosis in the left proximal ICA 3. 3 noncalcified right lung pulmonary nodules. These findings are nonspecific. These are a new or enlarged compared to 2015. Consider chest CT or PET scan for further  "workup.  Electronically Signed By-Elijah Ivey MD On:3/26/2022 2:01 PM This report was finalized on [id number] by  Elijah Ivey MD.    CT Angiogram Head    Result Date: 3/26/2022   1. 64% stenosis within the right proximal ICA 2. A percent stenosis in the left proximal ICA 3. 3 noncalcified right lung pulmonary nodules. These findings are nonspecific. These are a new or enlarged compared to 2015. Consider chest CT or PET scan for further workup.  Electronically Signed By-Elijah Ivey MD On:3/26/2022 2:01 PM This report was finalized on [id number] by  Elijah Ivey MD.      CONSULTS   Consults       Date and Time Order Name Status Description    4/14/2022 10:10 AM Inpatient Intensivist Consult Completed             CONDITION ON DISCHARGE    Stable    VITAL SIGNS  /66   Pulse 78   Temp 99 °F (37.2 °C) (Oral)   Resp 17   Ht 157.5 cm (62\")   Wt 73.5 kg (162 lb 0.6 oz)   SpO2 93%   BMI 29.64 kg/m²     PHYSICAL EXAM  Constitutional:  Well developed, well nourished, no acute distress, non-toxic appearance   Eyes:  PERRL, conjunctiva normal, EOM grossly intact  HENT:  Atraumatic, external ears normal, nose normal. Neck-left lateral incision well approximated, supple, trachea midline  Respiratory: Fine bibasilar crackles, no wheezing, no rhonchi, non-labored respirations without accessory muscle use  Cardiovascular:  Normal rate, sinus rhythm, no murmurs, no gallops, no rubs   GI:  Soft, nondistended, normal bowel sounds, nontender, no rebound or guarding  : Deferred  Musculoskeletal:  No edema, no clubbing or cyanosis, no deformities  Integument:  Well hydrated, no rash.  Left lateral neck incision well approximated  Neurologic:  Alert & oriented x 3, no lateralizing deficits  Psychiatric:  Speech and behavior appropriate       DISCHARGE DISPOSITION  Home or Self Care    DISCHARGE MEDICATIONS     Discharge Medications        New Medications        Instructions Start Date   hydrALAZINE 50 MG " tablet  Commonly known as: APRESOLINE   50 mg, Oral, Every 8 Hours Scheduled             Continue These Medications        Instructions Start Date   aspirin 81 MG tablet   81 mg, Oral, Daily, HOLD DOS      clopidogrel 75 MG tablet  Commonly known as: PLAVIX   75 mg, Oral, Daily      escitalopram 5 MG tablet  Commonly known as: LEXAPRO   5 mg, Oral, Daily      ezetimibe 10 MG tablet  Commonly known as: ZETIA   10 mg, Oral, Daily      losartan 50 MG tablet  Commonly known as: COZAAR   75 mg, Oral, Daily, Take 1 1/2 tab daily     HOLD 24 hours before surgery      metoprolol succinate XL 25 MG 24 hr tablet  Commonly known as: TOPROL-XL   25 mg, Oral, Daily      nitrofurantoin 50 MG capsule  Commonly known as: MACRODANTIN   Nightly, Daily for UTI prevention      nitroglycerin 0.4 MG SL tablet  Commonly known as: NITROSTAT   As Needed      pantoprazole 40 MG EC tablet  Commonly known as: Protonix   40 mg, Oral, Daily      rosuvastatin 10 MG tablet  Commonly known as: CRESTOR   10 mg, Oral, Daily      temazepam 15 MG capsule  Commonly known as: RESTORIL   15 mg, Oral      vitamin D3 125 MCG (5000 UT) capsule capsule   5,000 Units, Oral, Daily                 DISCHARGE DIET healthy heart      ACTIVITY AT DISCHARGE as tolerated      FOLLOW-UP APPOINTMENTS  Future Appointments   Date Time Provider Department Center   6/10/2022  9:00 AM Jagdeep Perez DO [facility]           TEST RESULTS PENDING AT DISCHARGE-none        This note scribed by me for Dr. Sierra    Time: 45 minutes    Olive  Day, APRN  4/17/2022    Note reviewed    Electronically signed by MD. JACKLYN Chinchilla, ABSM.

## 2022-04-16 NOTE — PROGRESS NOTES
"PULMONARY CRITICAL CARE Progress  NOTE      PATIENT IDENTIFICATION:  Name: Elizabeth Whaley  MRN: [id number]  :  1940     Age: 81 y.o.  Sex: female    DATE OF Note:  2022   Referring Physician: Dilshad Domingo MD                  Subjective:   Event hypotensive BP  This morning more stable    no SOB no chest pain, no nausea or vomiting, no change in bowel habit, no dysuria,  no new  skin rash or itching.      Objective:  tMax 24 hrs: Temp (24hrs), Av.8 °F (36.6 °C), Min:97 °F (36.1 °C), Max:98.1 °F (36.7 °C)      Vitals Ranges:   Temp:  [97 °F (36.1 °C)-98.1 °F (36.7 °C)] 98 °F (36.7 °C)  Heart Rate:  [16-76] 16  Resp:  [14-17] 16  BP: (120-188)/(27-70) 152/35  Arterial Line BP: (114-161)/(29-43) 114/34    Intake and Output Last 3 Shifts:   I/O last 3 completed shifts:  In: 2844 [P.O.:440; I.V.:2404]  Out: 1700 [Urine:1700]    Exam:  BP (!) 152/35   Pulse (!) 16   Temp 98 °F (36.7 °C) (Oral)   Resp 16   Ht 157.5 cm (62\")   Wt 73.5 kg (162 lb 0.6 oz)   SpO2 98%   BMI 29.64 kg/m²     General Appearance:   AA  HEENT:  Normocephalic, without obvious abnormality, Conjunctiva/corneas clear,.  Normal external ear canals, Nares normal, no drainage     Neck:  Supple, symmetrical, trachea midline. No JVD.  Lungs /Chest wall:   Bilateral basal rhonchi, respirations unlabored symmetrical wall movement.     Heart:  Regular rate and rhythm, systolic murmur PMI left sternal border  Abdomen: Soft, non-tender, no masses, no organomegaly.    Extremities: Trace edema no clubbing or Cyanosis        Medications:    Current Facility-Administered Medications:   •  acetaminophen (TYLENOL) tablet 650 mg, 650 mg, Oral, Q4H PRN **OR** acetaminophen (TYLENOL) suppository 650 mg, 650 mg, Rectal, Q4H PRN, Paresh Calero, COCO  •  aluminum-magnesium hydroxide-simethicone (MAALOX MAX) 400-400-40 MG/5ML suspension 15 mL, 15 mL, Oral, Q6H PRN, Paresh Calero, COCO  •  aspirin EC tablet 81 mg, 81 mg, Oral, Daily, Jose L, " Dilshad STEEN MD, 81 mg at 04/16/22 0758  •  sennosides-docusate (PERICOLACE) 8.6-50 MG per tablet 2 tablet, 2 tablet, Oral, BID, 2 tablet at 04/15/22 2132 **AND** polyethylene glycol (MIRALAX) packet 17 g, 17 g, Oral, Daily PRN **AND** bisacodyl (DULCOLAX) EC tablet 5 mg, 5 mg, Oral, Daily PRN **AND** bisacodyl (DULCOLAX) suppository 10 mg, 10 mg, Rectal, Daily PRN, Paresh Calero APRN  •  budesonide (PULMICORT) nebulizer solution 0.5 mg, 0.5 mg, Nebulization, BID - RT, Peg Lara APRN, 0.5 mg at 04/15/22 2019  •  enoxaparin (LOVENOX) syringe 40 mg, 40 mg, Subcutaneous, Daily, Dilshad Domingo MD, 40 mg at 04/15/22 1643  •  escitalopram (LEXAPRO) tablet 5 mg, 5 mg, Oral, Daily, Dilshad Domingo MD, 5 mg at 04/16/22 0757  •  hydrALAZINE (APRESOLINE) tablet 50 mg, 50 mg, Oral, Q8H, Iman Sierra MD, 50 mg at 04/16/22 1138  •  HYDROcodone-acetaminophen (NORCO) 5-325 MG per tablet 1 tablet, 1 tablet, Oral, Q4H PRN, Dilshad Domingo MD, 1 tablet at 04/16/22 0758  •  ipratropium-albuterol (DUO-NEB) nebulizer solution 3 mL, 3 mL, Nebulization, BID - RT, Iman Sierra MD, 3 mL at 04/15/22 2018  •  ipratropium-albuterol (DUO-NEB) nebulizer solution 3 mL, 3 mL, Nebulization, Q6H PRN, Iman Sierra MD  •  labetalol (NORMODYNE,TRANDATE) injection 10 mg, 10 mg, Intravenous, Q4H PRN, Paresh Calero APRN, 10 mg at 04/14/22 1233  •  lactated ringers infusion, 50 mL/hr, Intravenous, Continuous, Dilshad Domingo MD, Last Rate: 50 mL/hr at 04/15/22 2133, 50 mL/hr at 04/15/22 2133  •  losartan (COZAAR) tablet 75 mg, 75 mg, Oral, Daily, Dilshad Domingo MD, 75 mg at 04/16/22 0758  •  metoprolol succinate XL (TOPROL-XL) 24 hr tablet 25 mg, 25 mg, Oral, Daily, Dilshad Domingo MD, 25 mg at 04/16/22 0757  •  Morphine sulfate (PF) injection 2 mg, 2 mg, Intravenous, Q4H PRN, 2 mg at 04/14/22 2100 **AND** naloxone (NARCAN) injection 0.4 mg, 0.4 mg, Intravenous, Q5 Min PRN, Dilshad Domingo MD  •  niCARdipine (CARDENE) 25mg  in 250mL NS infusion, 2.5-15 mg/hr, Intravenous, Titrated, Colby Nevarez MD, Stopped at 04/16/22 0912  •  nitroglycerin (NITROSTAT) SL tablet 0.4 mg, 0.4 mg, Sublingual, Q5 Min PRN, Paresh Calero APRN  •  ondansetron (ZOFRAN) tablet 4 mg, 4 mg, Oral, Q6H PRN **OR** ondansetron (ZOFRAN) injection 4 mg, 4 mg, Intravenous, Q6H PRN, Dilshad Domingo MD, 4 mg at 04/14/22 1158  •  pantoprazole (PROTONIX) EC tablet 40 mg, 40 mg, Oral, Daily, Dilshad Domingo MD, 40 mg at 04/16/22 0758  •  rosuvastatin (CRESTOR) tablet 20 mg, 20 mg, Oral, Daily, Dilshad Domingo MD, 20 mg at 04/16/22 0759  •  sodium chloride 0.9 % flush 10 mL, 10 mL, Intravenous, Q12H, Dilshad Domingo MD, 10 mL at 04/16/22 0800  •  sodium chloride 0.9 % flush 10 mL, 10 mL, Intravenous, PRN, Dilshad Domingo MD  •  sodium chloride 0.9 % flush 10 mL, 10 mL, Intravenous, Q12H, Paresh Calero APRN, 10 mL at 04/16/22 0800  •  sodium chloride 0.9 % flush 10 mL, 10 mL, Intravenous, PRN, Paresh Calero APRN  •  temazepam (RESTORIL) capsule 15 mg, 15 mg, Oral, Nightly PRN, Dilshad Domingo MD, 15 mg at 04/15/22 1701    Data Review:  All labs (24hrs):   Recent Results (from the past 24 hour(s))   Magnesium    Collection Time: 04/16/22  4:36 AM    Specimen: Blood   Result Value Ref Range    Magnesium 1.8 1.6 - 2.4 mg/dL   Phosphorus    Collection Time: 04/16/22  4:36 AM    Specimen: Blood   Result Value Ref Range    Phosphorus 3.0 2.5 - 4.5 mg/dL   Comprehensive Metabolic Panel    Collection Time: 04/16/22  4:36 AM    Specimen: Blood   Result Value Ref Range    Glucose 108 (H) 65 - 99 mg/dL    BUN 12 8 - 23 mg/dL    Creatinine 0.45 (L) 0.57 - 1.00 mg/dL    Sodium 140 136 - 145 mmol/L    Potassium 4.1 3.5 - 5.2 mmol/L    Chloride 104 98 - 107 mmol/L    CO2 26.0 22.0 - 29.0 mmol/L    Calcium 8.9 8.6 - 10.5 mg/dL    Total Protein 5.8 (L) 6.0 - 8.5 g/dL    Albumin 3.60 3.50 - 5.20 g/dL    ALT (SGPT) 9 1 - 33 U/L    AST (SGOT) 12 1 - 32 U/L    Alkaline  Phosphatase 64 39 - 117 U/L    Total Bilirubin 0.4 0.0 - 1.2 mg/dL    Globulin 2.2 gm/dL    A/G Ratio 1.6 g/dL    BUN/Creatinine Ratio 26.7 (H) 7.0 - 25.0    Anion Gap 10.0 5.0 - 15.0 mmol/L    eGFR 97.4 >60.0 mL/min/1.73   CBC Auto Differential    Collection Time: 04/16/22  4:36 AM    Specimen: Blood   Result Value Ref Range    WBC 9.60 3.40 - 10.80 10*3/mm3    RBC 4.13 3.77 - 5.28 10*6/mm3    Hemoglobin 12.3 12.0 - 15.9 g/dL    Hematocrit 36.9 34.0 - 46.6 %    MCV 89.3 79.0 - 97.0 fL    MCH 29.7 26.6 - 33.0 pg    MCHC 33.2 31.5 - 35.7 g/dL    RDW 13.2 12.3 - 15.4 %    RDW-SD 41.1 37.0 - 54.0 fl    MPV 8.7 6.0 - 12.0 fL    Platelets 166 140 - 450 10*3/mm3    Neutrophil % 84.7 (H) 42.7 - 76.0 %    Lymphocyte % 8.0 (L) 19.6 - 45.3 %    Monocyte % 6.4 5.0 - 12.0 %    Eosinophil % 0.3 0.3 - 6.2 %    Basophil % 0.6 0.0 - 1.5 %    Neutrophils, Absolute 8.10 (H) 1.70 - 7.00 10*3/mm3    Lymphocytes, Absolute 0.80 0.70 - 3.10 10*3/mm3    Monocytes, Absolute 0.60 0.10 - 0.90 10*3/mm3    Eosinophils, Absolute 0.00 0.00 - 0.40 10*3/mm3    Basophils, Absolute 0.10 0.00 - 0.20 10*3/mm3    nRBC 0.0 0.0 - 0.2 /100 WBC        Imaging:  XR Chest 1 View  Narrative: DATE OF EXAM:  4/16/2022 11:14 AM     PROCEDURE:  XR CHEST 1 VW-     INDICATIONS:  acute hypoxic respiratory failure     COMPARISON:  5/3/2015     TECHNIQUE:   Single radiographic view of the chest was obtained.     FINDINGS:  Lungs normal expanded. Cardiomegaly and postoperative changes the heart.  Mild blunting of both costophrenic angles. Scar in the lung bases  unchanged. Pulmonary vessels are distinct. No pneumothorax.     Impression: Mild blunting the costophrenic angles. This could be seen with small  pleural effusions.  Cardiomegaly and stable scarring lung bases.     Electronically Signed By-Cathie Grissom MD On:4/16/2022 11:22 AM  This report was finalized on [id number] by  Cathie Grissom MD.       ASSESSMENT:  Carotid stenosis, asymptomatic,  bilateral  COPD  A-fib  CAD  GERD  Hx breast cancer  Anxiety  HTN  MYRTLE      PLAN:  ICU management  Blood pressure management   antiplatelets   Bronchodilator  Inhaled corticosteroids  Hemodynamic support as needed  Electrolytes/ glycemic control  DVT and GI prophylaxis.    Total Critical care time in direct medical management (   ) minutes, This time specifically excludes time spent performing procedures.  Iman Sierra MD. D, ABSM.     4/16/2022  14:26 EDT

## 2022-04-16 NOTE — NURSING NOTE
Patient refusing to continue hospital stay, despite education on critical care status. Rigo LEHMAN at bedside, spoke with patient and asked RN to contact vascular surgery for discharge planning. Guanako Nevarez contacted via phone call and stated that patient can be discharged regardless off SBP >180 as long as patient is aware of risk factors. Patient thoroughly educated on risk factors of hypertension post surgery and educated to return to [facility] ER if development of headache occurs. Rigo LEHMAN updated, FOZIA Feliciano to place discharge orders.

## 2022-04-16 NOTE — PROGRESS NOTES
Name: Elizabeth Whaley ADMIT: 2022   : 1940  PCP: Gerda Wadsworth MD    MRN: [id number] LOS: 2 days   AGE/SEX: 81 y.o. female  ROOM: [address] [place] [address] [facility]    [place], [facility] [employer]    No chief complaint on file.    CC: carotid artery disease   Subjective     81 y.o. female s/p left carotid endarterectomy for high grade stenosis, asymptomatic disease. Doing well. Denies focal neurologic deficits.  No issues swallowing.  Denies headache.    Review of Systems   Constitutional: Negative for chills and fever.   Respiratory: Negative for shortness of breath.    Cardiovascular: Negative for chest pain.   Neurological: Negative.        Objective     Scheduled Medications:   aspirin, 81 mg, Oral, Daily  budesonide, 0.5 mg, Nebulization, BID - RT  enoxaparin, 40 mg, Subcutaneous, Daily  escitalopram, 5 mg, Oral, Daily  hydrALAZINE, 50 mg, Oral, Q8H  ipratropium-albuterol, 3 mL, Nebulization, BID - RT  losartan, 75 mg, Oral, Daily  metoprolol succinate XL, 25 mg, Oral, Daily  pantoprazole, 40 mg, Oral, Daily  rosuvastatin, 20 mg, Oral, Daily  senna-docusate sodium, 2 tablet, Oral, BID  sodium chloride, 10 mL, Intravenous, Q12H  sodium chloride, 10 mL, Intravenous, Q12H        Active Infusions:  lactated ringers, 50 mL/hr, Last Rate: 50 mL/hr (04/15/22 6043)  niCARdipine, 2.5-15 mg/hr, Last Rate: Stopped (22 0912)        As Needed Medications:  •  acetaminophen **OR** acetaminophen  •  aluminum-magnesium hydroxide-simethicone  •  senna-docusate sodium **AND** polyethylene glycol **AND** bisacodyl **AND** bisacodyl  •  HYDROcodone-acetaminophen  •  ipratropium-albuterol  •  labetalol  •  Morphine **AND** naloxone  •  nitroglycerin  •  ondansetron **OR** ondansetron  •  sodium chloride  •  sodium chloride  •  temazepam    Vital Signs  Vital Signs   Patient Vitals for the past 24 hrs:   BP Temp Temp src Pulse Resp SpO2 Weight   22 0837 (!) 188/70 -- -- 74 -- -- --   22 0800 -- 97.9 °F (36.6  °C) Oral -- -- -- --   04/16/22 0757 (!) 150/39 -- -- 68 -- -- --   04/16/22 0750 -- 97.9 °F (36.6 °C) Oral -- -- -- --   04/16/22 0600 -- -- -- 60 -- 98 % --   04/16/22 0500 -- -- -- 63 -- 96 % --   04/16/22 0432 -- -- -- -- -- -- 73.5 kg (162 lb 0.6 oz)   04/16/22 0430 (!) 125/35 -- -- 64 -- -- --   04/16/22 0400 -- 97.7 °F (36.5 °C) Oral 70 -- (!) 87 % --   04/16/22 0325 171/44 -- -- 73 -- -- --   04/16/22 0300 -- -- -- 76 -- (!) 89 % --   04/16/22 0200 -- -- -- 69 -- 96 % --   04/16/22 0100 -- -- -- 66 -- 97 % --   04/16/22 0000 -- 97 °F (36.1 °C) Oral 65 -- 97 % --   04/15/22 2300 -- -- -- 67 -- 93 % --   04/15/22 2212 (!) 120/27 -- -- 65 -- -- --   04/15/22 2200 -- -- -- 69 -- 93 % --   04/15/22 2132 (!) 139/34 -- -- 69 -- -- --   04/15/22 2100 -- -- -- 67 -- 94 % --   04/15/22 2057 (!) 131/34 -- -- 68 -- -- --   04/15/22 2030 (!) 126/33 -- -- 70 -- -- --   04/15/22 2021 -- -- -- 61 -- 95 % --   04/15/22 2019 -- -- -- 62 17 93 % --   04/15/22 2000 -- 98 °F (36.7 °C) Oral 65 -- 95 % --   04/15/22 1915 (!) 155/37 -- -- 69 -- -- --   04/15/22 1900 -- -- -- 71 -- 93 % --   04/15/22 1800 -- -- -- 71 -- 92 % --   04/15/22 1700 -- -- -- 68 -- 95 % --   04/15/22 1600 -- 98.1 °F (36.7 °C) Oral 67 -- 96 % --   04/15/22 1547 -- -- -- -- 16 -- --   04/15/22 1500 -- -- -- 70 -- (!) 89 % --   04/15/22 1400 -- -- -- 64 -- 97 % --   04/15/22 1300 145/63 -- -- 64 -- 96 % --   04/15/22 1200 108/47 98.3 °F (36.8 °C) Oral 68 23 93 % --   04/15/22 1114 (!) 139/35 -- -- 70 -- -- --   04/15/22 1100 125/50 -- -- 69 -- 95 % --   04/15/22 1036 (!) 143/39 -- -- 63 -- -- --   04/15/22 1000 124/49 -- -- 64 -- 96 % --     I/O:  I/O last 3 completed shifts:  In: 2844 [P.O.:440; I.V.:2404]  Out: 1700 [Urine:1700]  BMI:  Body mass index is 29.64 kg/m².    Physical Exam:  Physical Exam  Constitutional:       General: She is not in acute distress.  Cardiovascular:      Rate and Rhythm: Normal rate.   Pulmonary:      Effort: Pulmonary effort is  normal.   Skin:     Comments: Incision line is clean, dry and intact.  Mild ecchymosis.  No hematoma.   Neurological:      General: No focal deficit present.      Mental Status: She is alert and oriented to person, place, and time.      Motor: No weakness.      Comments: Tongue is midline.  Moving all 4 extremities without issue.  Speaking and alert.     Her voice is gravelly today and it has gotten worse which signifies likely throat irritation from her NG tube.    Results Review:     CBC    Results from last 7 days   Lab Units 04/16/22  0436 04/15/22  0355   WBC 10*3/mm3 9.60 10.60   HEMOGLOBIN g/dL 12.3 12.3   PLATELETS 10*3/mm3 166 168     BMP   Results from last 7 days   Lab Units 04/16/22  0436 04/15/22  0355   SODIUM mmol/L 140 136   POTASSIUM mmol/L 4.1 4.2   CHLORIDE mmol/L 104 101   CO2 mmol/L 26.0 24.0   BUN mg/dL 12 11   CREATININE mg/dL 0.45* 0.41*   GLUCOSE mg/dL 108* 158*   MAGNESIUM mg/dL 1.8 1.7   PHOSPHORUS mg/dL 3.0 3.8     Coag     HbA1C No results found for: HGBA1C  Infection     Radiology(recent) No radiology results for the last day    Assessment/Plan     Assessment & Plan      Carotid stenosis, asymptomatic, bilateral      81 y.o. female POD #2 left carotid endarterectomy for asymptomatic high-grade stenosis.  Patient tolerated surgery well.  No focal neurologic deficits.  Incision line clean dry and intact, no hematoma.blood pressure remains elevated.  We will tolerate blood pressure less than 160 now and attempt to get her art line out.  At this point time she is stable for discharge from our standpoint as long as she can maintain her pressure under 160 and she does not have associated headaches.  She will follow up in 2 weeks in the office for wound check and we will schedule her scans from there out.      Colby Nevarez MD  04/16/22  09:33 EDT    Please call my office with any question: [phone]    Active Hospital Problems    Diagnosis  POA   • Carotid stenosis, asymptomatic,  bilateral [I65.23]  Yes      Resolved Hospital Problems   No resolved problems to display.

## 2022-04-17 NOTE — OUTREACH NOTE
Prep Survey    Flowsheet Row Responses   [facility] facility patient discharged from? [facility]   Is LACE score < 7 ? No   Emergency Room discharge w/ pulse ox? No   Eligibility Readm Mgmt   Discharge diagnosis Carotid stenosis s/p left carotid endarterectomy  with intraoperative shunting   Does the patient have one of the following disease processes/diagnoses(primary or secondary)? General Surgery   Does the patient have Home health ordered? No   Is there a DME ordered? No   Prep survey completed? Yes          LENARD HENRY - Registered Nurse

## 2022-04-18 NOTE — CASE MANAGEMENT/SOCIAL WORK
Case Management Discharge Note      Transportation Services  Private: Car (family)    Final Discharge Disposition Code: 01 - home or self-care   coffee

## 2022-04-20 ENCOUNTER — READMISSION MANAGEMENT (OUTPATIENT)
Dept: CALL CENTER | Facility: HOSPITAL | Age: 82
End: 2022-04-20

## 2022-04-20 NOTE — OUTREACH NOTE
General Surgery Week 1 Survey    Flowsheet Row Responses   [facility] patient discharged from? [facility]   Does the patient have one of the following disease processes/diagnoses(primary or secondary)? General Surgery   Week 1 attempt successful? Yes   Call start time 1115   Call end time 1121   Discharge diagnosis Carotid stenosis s/p left carotid endarterectomy  with intraoperative shunting   Is patient permission given to speak with other caregiver? No   Meds reviewed with patient/caregiver? Yes   Does the patient have all medications related to this admission filled (includes all antibiotics, pain medications, etc.) No   What is keeping the patient from filling the prescriptions? Script on hold per patient  [Patient reports that she is not going to take the hydralazine. ]   Nursing Interventions Nurse provided patient education, Nurse advised patient to call provider  [Advised to monitor home blood pressure and follow up with her Dr. ]   Is the patient taking all medications as directed (includes completed medication regime)? No   What is preventing the patient from taking all medications as directed? Other  [see above.]   Does the patient have a follow up appointment scheduled with their surgeon? Yes   Has the patient kept scheduled appointments due by today? Yes   Has home health visited the patient within 72 hours of discharge? N/A   Psychosocial issues? No   Did the patient receive a copy of their discharge instructions? Yes   Nursing interventions Reviewed instructions with patient   What is the patient's perception of their health status since discharge? Improving   Is the patient/caregiver able to teach back signs and symptoms of incisional infection? Fever, Increased redness, swelling or pain at the incisonal site, Increased drainage or bleeding   If the patient is a current smoker, are they able to teach back resources for cessation? Not a smoker   Is the patient/caregiver able to teach back the  hierarchy of who to call/visit for symptoms/problems? PCP, Specialist, Home health nurse, Urgent Care, ED, 911 Yes   Week 1 call completed? Yes   Revoked No further contact(revokes)-requires comment   Is the patient interested in additional calls from an ambulatory ?  NOTE:  applies to high risk patients requiring additional follow-up. No   Graduated/Revoked comments Declines follow up calls.           CINTHYA CRUZ - Registered Nurse

## 2022-04-28 ENCOUNTER — APPOINTMENT (OUTPATIENT)
Dept: VASCULAR SURGERY | Facility: HOSPITAL | Age: 82
End: 2022-04-28

## 2022-04-28 PROCEDURE — G0463 HOSPITAL OUTPT CLINIC VISIT: HCPCS

## 2022-04-29 ENCOUNTER — TRANSCRIBE ORDERS (OUTPATIENT)
Dept: ADMINISTRATIVE | Facility: HOSPITAL | Age: 82
End: 2022-04-29

## 2022-04-29 DIAGNOSIS — I65.23 BILATERAL CAROTID ARTERY OCCLUSION: Primary | ICD-10-CM

## 2022-06-08 ENCOUNTER — APPOINTMENT (OUTPATIENT)
Dept: VASCULAR SURGERY | Facility: HOSPITAL | Age: 82
End: 2022-06-08

## 2022-06-08 PROCEDURE — G0463 HOSPITAL OUTPT CLINIC VISIT: HCPCS

## 2022-06-10 ENCOUNTER — OFFICE VISIT (OUTPATIENT)
Dept: CARDIOLOGY | Facility: CLINIC | Age: 82
End: 2022-06-10

## 2022-06-10 VITALS
WEIGHT: 151 LBS | HEIGHT: 62 IN | SYSTOLIC BLOOD PRESSURE: 138 MMHG | DIASTOLIC BLOOD PRESSURE: 50 MMHG | HEART RATE: 56 BPM | OXYGEN SATURATION: 97 % | BODY MASS INDEX: 27.79 KG/M2

## 2022-06-10 DIAGNOSIS — I25.10 ARTERIOSCLEROSIS OF CORONARY ARTERY: Primary | ICD-10-CM

## 2022-06-10 DIAGNOSIS — E78.2 MIXED HYPERLIPIDEMIA: ICD-10-CM

## 2022-06-10 DIAGNOSIS — Z79.02 LONG TERM (CURRENT) USE OF ANTITHROMBOTICS/ANTIPLATELETS: ICD-10-CM

## 2022-06-10 DIAGNOSIS — I10 ESSENTIAL HYPERTENSION: ICD-10-CM

## 2022-06-10 DIAGNOSIS — I25.119 CORONARY ARTERY DISEASE WITH ANGINA PECTORIS, UNSPECIFIED VESSEL OR LESION TYPE, UNSPECIFIED WHETHER NATIVE OR TRANSPLANTED HEART: ICD-10-CM

## 2022-06-10 PROCEDURE — 99214 OFFICE O/P EST MOD 30 MIN: CPT | Performed by: INTERNAL MEDICINE

## 2022-06-10 NOTE — PROGRESS NOTES
Cardiology Office Visit      Encounter Date:  06/10/2022    Patient ID:   Elizabeth Whaley is a 81 y.o. female.    Reason For Followup:  Coronary artery disease  Hypertension  Hypertensive cardiovascular disease  Hyperlipidemia  Antiplatelet therapy    Brief Clinical History:  Dear Dr. Wadsworth, Gerda Diamond MD    I had the pleasure of seeing Elizabeth Whaley today. As you are well aware, this is a 81 y.o. female with a history of coronary artery disease she is undergone coronary arterial bypass grafting in 2015.  She has additional history that includes hypertension, hypertensive cardiovascular disease, hyperlipidemia, and antiplatelet therapy.  She presents today for follow-up on the above conditions.    Interval History:  She denies any chest pain pressure heaviness or tightness.  She denies any shortness of breath.  She denies any PND orthopnea.  She denies any syncope or near syncope.  Other than some occasional hot flashes, she reports feeling well from a cardiac perspective.    She underwent a vascular screening exam after her last visit.  It was demonstrable of significant carotid disease left greater than right.    As a result of the vascular screening exam, she underwent carotid surgery.  She has a spot at the inferior margin of her incision that is not healing.  She is following with vascular surgery for this.  She reports that she feels much better.  She reports that she had a bad experience with the arterial line at the hospital.    Assessment & Plan    Impressions:  Coronary artery disease status post two-vessel CABG in May 2015      SRINATH-RCA, LIMA-LAD  Hypertension  Hypertensive cardiovascular disease  Hyperlipidemia  Carotid artery disease status post carotid endarterectomy  Sweating episodes    Recommendations:  Continuation of her current cardiovascular regimen at the present time.     This includes antiplatelet therapy, antihypertensives, short acting nitrates, and statin therapy.  Follow-up in 6 months  "time sooner should the be difficulties.    Diagnoses and all orders for this visit:    1. Arteriosclerosis of coronary artery (Primary)    2. Coronary artery disease with angina pectoris, unspecified vessel or lesion type, unspecified whether native or transplanted heart (HCC)    3. Essential hypertension    4. Mixed hyperlipidemia    5. Long term (current) use of antithrombotics/antiplatelets          Objective:    Vitals:  Vitals:    06/10/22 0854   BP: 138/50   Pulse: 56   SpO2: 97%   Weight: 68.5 kg (151 lb)   Height: 157.5 cm (62\")     Body mass index is 27.62 kg/m².      Physical Exam:    General: Alert, cooperative, no distress, appears stated age  Head:  Normocephalic, atraumatic, mucous membranes moist  Eyes:  Conjunctiva/corneas clear, EOM's intact     Neck:  Supple, bilateral carotid bruits  Lungs: Clear to auscultation bilaterally, no wheezes rhonchi rales are noted  Chest wall: No tenderness  Heart::  Regular rate and rhythm, S1 and S2 normal, 1/6 holosystolic murmur.  No, rub or gallop  Abdomen: Soft, non-tender, nondistended bowel sounds active  Extremities: No cyanosis, clubbing, or edema  Pulses: 2+ and symmetric all extremities  Skin:  No rashes or lesions  Neuro/psych: A&O x3. CN II through XII are grossly intact with appropriate affect      Allergies:  Allergies   Allergen Reactions   • Codeine Unknown - Low Severity     Pt Cannot remember   • Levaquin [Levofloxacin] GI Intolerance   • Penicillins Rash   • Sulfa Antibiotics Unknown (See Comments) and Rash       Medication Review:     Current Outpatient Medications:   •  aspirin 81 MG tablet, Take 81 mg by mouth Daily. HOLD DOS, Disp: , Rfl:   •  escitalopram (LEXAPRO) 5 MG tablet, Take 5 mg by mouth Daily., Disp: , Rfl:   •  ezetimibe (ZETIA) 10 MG tablet, TAKE 1 TABLET BY MOUTH DAILY, Disp: 90 tablet, Rfl: 1  •  losartan (COZAAR) 50 MG tablet, Take 75 mg by mouth Daily. Take 1 1/2 tab daily, Disp: , Rfl:   •  metoprolol succinate XL (TOPROL-XL) " 25 MG 24 hr tablet, TAKE 1 TABLET BY MOUTH DAILY, Disp: 90 tablet, Rfl: 2  •  nitrofurantoin (MACRODANTIN) 50 MG capsule, Every Night. Daily for UTI prevention, Disp: , Rfl: 4  •  pantoprazole (Protonix) 40 MG EC tablet, Take 1 tablet by mouth Daily., Disp: 90 tablet, Rfl: 3  •  rosuvastatin (CRESTOR) 10 MG tablet, TAKE 1 TABLET BY MOUTH DAILY, Disp: 90 tablet, Rfl: 3  •  temazepam (RESTORIL) 15 MG capsule, Take 15 mg by mouth., Disp: , Rfl:   •  vitamin D3 125 MCG (5000 UT) capsule capsule, Take 5,000 Units by mouth Daily., Disp: , Rfl:     Family History:  Family History   Problem Relation Age of Onset   • Diabetes Mother        Past Medical History:  Past Medical History:   Diagnosis Date   • Anxiety    • Atrial fibrillation (HCC)    • Cancer (HCC)     Left breast Cancer and Skin cancer   • Coronary artery disease    • GERD (gastroesophageal reflux disease)    • Hyperlipidemia    • Hypertension    • Palpitations        Past Surgical History:  Past Surgical History:   Procedure Laterality Date   • BREAST LUMPECTOMY Left    • CARDIAC CATHETERIZATION     • CAROTID ENDARTERECTOMY Left 2022    Procedure: LEFT CAROTID ENDARTERECTOMY;  Surgeon: Dilshad Domingo MD;  Location: [facility];  Service: Vascular;  Laterality: Left;   • CHOLECYSTECTOMY     • CORONARY ARTERY BYPASS GRAFT     • HYSTERECTOMY         Social History:  Social History     Socioeconomic History   • Marital status:    Tobacco Use   • Smoking status: Former Smoker     Packs/day: 1.50     Types: Cigarettes     Quit date:      Years since quittin.4   • Smokeless tobacco: Never Used   Vaping Use   • Vaping Use: Never used   Substance and Sexual Activity   • Alcohol use: Yes     Alcohol/week: 14.0 standard drinks     Types: 14 Glasses of wine per week     Comment: routinely    • Drug use: No   • Sexual activity: Defer       Review of Systems:  The following systems were reviewed as they relate to the cardiovascular system:  Constitutional, Eyes, ENT, Cardiovascular, Respiratory, Gastrointestinal, Integumentary, Neurological, Psychiatric, Hematologic, Endocrine, Musculoskeletal, and Genitourinary. The pertinent cardiovascular findings are reported above with all other cardiovascular points within those systems being negative.    Diagnostic Study Review:     Current Electrocardiogram:  Procedures no new EKG. EKG dated 6 April 2022 demonstrates sinus bradycardia with a ventricular rate of 58 bpm.    Laboratory Data:  Lab Results   Component Value Date    GLUCOSE 108 (H) 04/16/2022    BUN 12 04/16/2022    CREATININE 0.45 (L) 04/16/2022    BCR 26.7 (H) 04/16/2022    K 4.1 04/16/2022    CO2 26.0 04/16/2022    CALCIUM 8.9 04/16/2022    ALBUMIN 3.60 04/16/2022    LABIL2 1.3 01/23/2020    AST 12 04/16/2022    ALT 9 04/16/2022     Lab Results   Component Value Date    GLUCOSE 108 (H) 04/16/2022    CALCIUM 8.9 04/16/2022     04/16/2022    K 4.1 04/16/2022    CO2 26.0 04/16/2022     04/16/2022    BUN 12 04/16/2022    CREATININE 0.45 (L) 04/16/2022    BCR 26.7 (H) 04/16/2022    ANIONGAP 10.0 04/16/2022     Lab Results   Component Value Date    WBC 9.60 04/16/2022    HGB 12.3 04/16/2022    HCT 36.9 04/16/2022    MCV 89.3 04/16/2022     04/16/2022     Lab Results   Component Value Date    CHOL 118 04/15/2022    TRIG 96 04/15/2022    HDL 46 04/15/2022    LDL 54 04/15/2022     No results found for: HGBA1C  No results found for: INR, PROTIME    Most Recent Echo:       Most Recent Stress Test:  Results for orders placed during the hospital encounter of 07/20/21    Stress Test With Myocardial Perfusion One Day    Interpretation Summary  · Myocardial perfusion imaging indicates a normal myocardial perfusion study with no evidence of ischemia.  · Fixed apical defect likely secondary to technical factors given normal wall motion in this area  · Left ventricular ejection fraction is hyperdynamic (Calculated EF > 70%). .  · Findings  consistent with a normal ECG stress test.  · Impressions are consistent with a low risk study.  · There is no prior study available for comparison.       Most Recent Cardiac Catheterization:   No results found for this or any previous visit.       NOTE: The following portions of the patient's note were reviewed, confirmed and/or updated this visit as appropriate: History of present illness/Interval history, physical examination, assessment & plan, allergies, current medications, past family history, past medical history, past social history, past surgical history and problem list.

## 2022-07-18 ENCOUNTER — APPOINTMENT (OUTPATIENT)
Dept: CARDIOLOGY | Facility: HOSPITAL | Age: 82
End: 2022-07-18

## 2022-08-01 ENCOUNTER — HOSPITAL ENCOUNTER (OUTPATIENT)
Dept: CARDIOLOGY | Facility: HOSPITAL | Age: 82
Discharge: HOME | End: 2022-08-01

## 2022-08-01 ENCOUNTER — APPOINTMENT (OUTPATIENT)
Dept: VASCULAR SURGERY | Facility: HOSPITAL | Age: 82
End: 2022-08-01

## 2022-08-01 ENCOUNTER — TRANSCRIBE ORDERS (OUTPATIENT)
Dept: ADMINISTRATIVE | Facility: HOSPITAL | Age: 82
End: 2022-08-01

## 2022-08-01 DIAGNOSIS — I65.23 BILATERAL CAROTID ARTERY OCCLUSION: ICD-10-CM

## 2022-08-01 DIAGNOSIS — I65.29 CAROTID STENOSIS, ASYMPTOMATIC, UNSPECIFIED LATERALITY: Primary | ICD-10-CM

## 2022-08-01 LAB
BH CV XLRA MEAS LEFT DIST CCA EDV: -13.4 CM/SEC
BH CV XLRA MEAS LEFT DIST CCA PSV: -167 CM/SEC
BH CV XLRA MEAS LEFT DIST ICA EDV: -20.9 CM/SEC
BH CV XLRA MEAS LEFT DIST ICA PSV: -147 CM/SEC
BH CV XLRA MEAS LEFT ICA/CCA RATIO: -1.58
BH CV XLRA MEAS LEFT MID ICA EDV: -31 CM/SEC
BH CV XLRA MEAS LEFT MID ICA PSV: -246 CM/SEC
BH CV XLRA MEAS LEFT PROX CCA EDV: 9.9 CM/SEC
BH CV XLRA MEAS LEFT PROX CCA PSV: 169 CM/SEC
BH CV XLRA MEAS LEFT PROX ECA PSV: -124 CM/SEC
BH CV XLRA MEAS LEFT PROX ICA EDV: -25.2 CM/SEC
BH CV XLRA MEAS LEFT PROX ICA PSV: -267 CM/SEC
BH CV XLRA MEAS LEFT PROX SCLA PSV: 126 CM/SEC
BH CV XLRA MEAS LEFT VERTEBRAL A PSV: 108 CM/SEC
BH CV XLRA MEAS RIGHT DIST CCA EDV: 8.1 CM/SEC
BH CV XLRA MEAS RIGHT DIST CCA PSV: 114 CM/SEC
BH CV XLRA MEAS RIGHT DIST ICA EDV: -16.2 CM/SEC
BH CV XLRA MEAS RIGHT DIST ICA PSV: -108 CM/SEC
BH CV XLRA MEAS RIGHT ICA/CCA RATIO: 1.81
BH CV XLRA MEAS RIGHT PROX CCA EDV: 11.2 CM/SEC
BH CV XLRA MEAS RIGHT PROX CCA PSV: 106 CM/SEC
BH CV XLRA MEAS RIGHT PROX ECA PSV: 115 CM/SEC
BH CV XLRA MEAS RIGHT PROX ICA EDV: 21.3 CM/SEC
BH CV XLRA MEAS RIGHT PROX ICA PSV: 206 CM/SEC
BH CV XLRA MEAS RIGHT PROX SCLA PSV: 370 CM/SEC
BH CV XLRA MEAS RIGHT VERTEBRAL A EDV: -6.8 CM/SEC
BH CV XLRA MEAS RIGHT VERTEBRAL A PSV: -123 CM/SEC
MAXIMAL PREDICTED HEART RATE: 140 BPM
RIGHT ARM BP: NORMAL MMHG
STRESS TARGET HR: 119 BPM

## 2022-08-01 PROCEDURE — 93880 EXTRACRANIAL BILAT STUDY: CPT

## 2022-08-01 PROCEDURE — G0463 HOSPITAL OUTPT CLINIC VISIT: HCPCS

## 2022-08-02 ENCOUNTER — HOSPITAL ENCOUNTER (OUTPATIENT)
Dept: CT IMAGING | Facility: HOSPITAL | Age: 82
Discharge: HOME | End: 2022-08-02
Admitting: SURGERY

## 2022-08-02 DIAGNOSIS — I65.29 CAROTID STENOSIS, ASYMPTOMATIC, UNSPECIFIED LATERALITY: ICD-10-CM

## 2022-08-02 LAB
CREAT BLDA-MCNC: 0.7 MG/DL (ref 0.6–1.3)
EGFRCR SERPLBLD CKD-EPI 2021: 87.6 ML/MIN/1.73

## 2022-08-02 PROCEDURE — 70496 CT ANGIOGRAPHY HEAD: CPT

## 2022-08-02 PROCEDURE — 70498 CT ANGIOGRAPHY NECK: CPT

## 2022-08-02 PROCEDURE — 82565 ASSAY OF CREATININE: CPT

## 2022-08-02 PROCEDURE — 0 IOPAMIDOL PER 1 ML: Performed by: SURGERY

## 2022-08-02 RX ADMIN — IOPAMIDOL 100 ML: 755 INJECTION, SOLUTION INTRAVENOUS at 09:59

## 2022-08-04 ENCOUNTER — PRE-ADMISSION TESTING (OUTPATIENT)
Dept: PREADMISSION TESTING | Facility: HOSPITAL | Age: 82
End: 2022-08-04

## 2022-08-04 VITALS
DIASTOLIC BLOOD PRESSURE: 62 MMHG | OXYGEN SATURATION: 97 % | HEIGHT: 62 IN | RESPIRATION RATE: 18 BRPM | WEIGHT: 150 LBS | TEMPERATURE: 98.1 F | SYSTOLIC BLOOD PRESSURE: 165 MMHG | HEART RATE: 73 BPM | BODY MASS INDEX: 27.6 KG/M2

## 2022-08-04 LAB
ANION GAP SERPL CALCULATED.3IONS-SCNC: 11.8 MMOL/L (ref 5–15)
BUN SERPL-MCNC: 21 MG/DL (ref 8–23)
BUN/CREAT SERPL: 33.3 (ref 7–25)
CALCIUM SPEC-SCNC: 9.1 MG/DL (ref 8.6–10.5)
CHLORIDE SERPL-SCNC: 104 MMOL/L (ref 98–107)
CO2 SERPL-SCNC: 22.2 MMOL/L (ref 22–29)
CREAT SERPL-MCNC: 0.63 MG/DL (ref 0.57–1)
DEPRECATED RDW RBC AUTO: 39.3 FL (ref 37–54)
EGFRCR SERPLBLD CKD-EPI 2021: 89.8 ML/MIN/1.73
ERYTHROCYTE [DISTWIDTH] IN BLOOD BY AUTOMATED COUNT: 12.4 % (ref 12.3–15.4)
GLUCOSE SERPL-MCNC: 133 MG/DL (ref 65–99)
HCT VFR BLD AUTO: 38.9 % (ref 34–46.6)
HGB BLD-MCNC: 12.9 G/DL (ref 12–15.9)
MCH RBC QN AUTO: 29 PG (ref 26.6–33)
MCHC RBC AUTO-ENTMCNC: 33.2 G/DL (ref 31.5–35.7)
MCV RBC AUTO: 87.4 FL (ref 79–97)
PLATELET # BLD AUTO: 198 10*3/MM3 (ref 140–450)
PMV BLD AUTO: 10.5 FL (ref 6–12)
POTASSIUM SERPL-SCNC: 4.4 MMOL/L (ref 3.5–5.2)
RBC # BLD AUTO: 4.45 10*6/MM3 (ref 3.77–5.28)
SODIUM SERPL-SCNC: 138 MMOL/L (ref 136–145)
WBC NRBC COR # BLD: 10.87 10*3/MM3 (ref 3.4–10.8)

## 2022-08-04 PROCEDURE — 36415 COLL VENOUS BLD VENIPUNCTURE: CPT

## 2022-08-04 PROCEDURE — 85027 COMPLETE CBC AUTOMATED: CPT

## 2022-08-04 PROCEDURE — 80048 BASIC METABOLIC PNL TOTAL CA: CPT

## 2022-08-04 RX ORDER — CHLORHEXIDINE GLUCONATE 500 MG/1
CLOTH TOPICAL TAKE AS DIRECTED
COMMUNITY
End: 2022-08-12 | Stop reason: HOSPADM

## 2022-08-04 RX ORDER — CLINDAMYCIN HYDROCHLORIDE 300 MG/1
300 CAPSULE ORAL 3 TIMES DAILY
Status: ON HOLD | COMMUNITY
End: 2022-08-12

## 2022-08-04 NOTE — DISCHARGE INSTRUCTIONS
Take the following medications the morning of surgery: PANTOPRAZOLE    ARRIVE  AM ON 8/12/22    If you are on prescription narcotic pain medication to control your pain you may also take that medication the morning of surgery.    General Instructions:  Do not eat solid food after midnight the night before surgery.  You may drink clear liquids day of surgery but must stop at least one hour before your hospital arrival time.  It is beneficial for you to have a clear drink that contains carbohydrates the day of surgery.  We suggest a 12 to 20 ounce bottle of Gatorade or Powerade for non-diabetic patients or a 12 to 20 ounce bottle of G2 or Powerade Zero for diabetic patients. (Pediatric patients, are not advised to drink a 12 to 20 ounce carbohydrate drink)    Clear liquids are liquids you can see through.  Nothing red in color.     Plain water                               Sports drinks  Sodas                                   Gelatin (Jell-O)  Fruit juices without pulp such as white grape juice and apple juice  Popsicles that contain no fruit or yogurt  Tea or coffee (no cream or milk added)  Gatorade / Powerade  G2 / Powerade Zero    Infants may have breast milk up to four hours before surgery.  Infants drinking formula may drink formula up to six hours before surgery.   Patients who avoid smoking, chewing tobacco and alcohol for 4 weeks prior to surgery have a reduced risk of post-operative complications.  Quit smoking as many days before surgery as you can.  Do not smoke, use chewing tobacco or drink alcohol the day of surgery.   If applicable bring your C-PAP/ BI-PAP machine.  Bring any papers given to you in the doctor’s office.  Wear clean comfortable clothes.  Do not wear contact lenses, false eyelashes or make-up.  Bring a case for your glasses.   Bring crutches or walker if applicable.  Remove all piercings.  Leave jewelry and any other valuables at home.  Hair extensions with metal clips must be  removed prior to surgery.  The Pre-Admission Testing nurse will instruct you to bring medications if unable to obtain an accurate list in Pre-Admission Testing.        If you were given a blood bank ID arm band remember to bring it with you the day of surgery.    Preventing a Surgical Site Infection:  For 2 to 3 days before surgery, avoid shaving with a razor because the razor can irritate skin and make it easier to develop an infection.    Any areas of open skin can increase the risk of a post-operative wound infection by allowing bacteria to enter and travel throughout the body.  Notify your surgeon if you have any skin wounds / rashes even if it is not near the expected surgical site.  The area will need assessed to determine if surgery should be delayed until it is healed.  The night prior to surgery shower using a fresh bar of anti-bacterial soap (such as Dial) and clean washcloth.  Sleep in a clean bed with clean clothing.  Do not allow pets to sleep with you.  Shower on the morning of surgery using a fresh bar of anti-bacterial soap (such as Dial) and clean washcloth.  Dry with a clean towel and dress in clean clothing.  Ask your surgeon if you will be receiving antibiotics prior to surgery.  Make sure you, your family, and all healthcare providers clean their hands with soap and water or an alcohol based hand  before caring for you or your wound.    Day of surgery:  Your arrival time is approximately two hours before your scheduled surgery time.  Upon arrival, a Pre-op nurse and Anesthesiologist will review your health history, obtain vital signs, and answer questions you may have.  The only belongings needed at this time will be a list of your home medications and if applicable your C-PAP/BI-PAP machine.  A Pre-op nurse will start an IV and you may receive medication in preparation for surgery, including something to help you relax.     Please be aware that surgery does come with discomfort.  We  want to make every effort to control your discomfort so please discuss any uncontrolled symptoms with your nurse.   Your doctor will most likely have prescribed pain medications.      If you are going home after surgery you will receive individualized written care instructions before being discharged.  A responsible adult must drive you to and from the hospital on the day of your surgery and stay with you for 24 hours.  Discharge prescriptions can be filled by the hospital pharmacy during regular pharmacy hours.  If you are having surgery late in the day/evening your prescription may be e-prescribed to your pharmacy.  Please verify your pharmacy hours or chose a 24 hour pharmacy to avoid not having access to your prescription because your pharmacy has closed for the day.    If you are staying overnight following surgery, you will be transported to your hospital room following the recovery period.  [facility] has all private rooms.    If you have any questions please call Pre-Admission Testing at [phone].  Deductibles and co-payments are collected on the day of service. Please be prepared to pay the required co-pay, deductible or deposit on the day of service as defined by your plan.    Patient Education for Self-Quarantine Process    Following your COVID testing, we strongly recommend that you wear a mask when you are with other people and practice social distancing.   Limit your activities to only required outings.  Wash your hands with soap and water frequently for at least 20 seconds.   Avoid touching your eyes, nose and mouth with unwashed hands.  Do not share anything - utensils, drinking glasses, food from the same bowl.   Sanitize household surfaces daily. Include all high touch areas (door handles, light switches, phones, countertops, etc.)    Call your surgeon immediately if you experience any of the following symptoms:  Sore Throat  Shortness of Breath or difficulty  breathing  Cough  Chills  Body soreness or muscle pain  Headache  Fever  New loss of taste or smell  Do not arrive for your surgery ill.  Your procedure will need to be rescheduled to another time.  You will need to call your physician before the day of surgery to avoid any unnecessary exposure to hospital staff as well as other patients.   CHLORHEXIDINE CLOTH INSTRUCTIONS  The morning of surgery follow these instructions using the Chlorhexidine cloths you've been given.  These steps reduce bacteria on the body.  Do not use the cloths near your eyes, ears mouth, genitalia or on open wounds.  Throw the cloths away after use but do not try to flush them down a toilet.      Open and remove one cloth at a time from the package.    Leave the cloth unfolded and begin the bathing.  Massage the skin with the cloths using gentle pressure to remove bacteria.  Do not scrub harshly.   Follow the steps below with one 2% CHG cloth per area (6 total cloths).  One cloth for neck, shoulders and chest.  One cloth for both arms, hands, fingers and underarms (do underarms last).  One cloth for the abdomen followed by groin.  One cloth for right leg and foot including between the toes.  One cloth for left leg and foot including between the toes.  The last cloth is to be used for the back of the neck, back and buttocks.    Allow the CHG to air dry 3 minutes on the skin which will give it time to work and decrease the chance of irritation.  The skin may feel sticky until it is dry.  Do not rinse with water or any other liquid or you will lose the beneficial effects of the CHG.  If mild skin irritation occurs, do rinse the skin to remove the CHG.  Report this to the nurse at time of admission.  Do not apply lotions, creams, ointments, deodorants or perfumes after using the clothes. Dress in clean clothes before coming to the hospital.

## 2022-08-10 ENCOUNTER — LAB (OUTPATIENT)
Dept: LAB | Facility: HOSPITAL | Age: 82
End: 2022-08-10

## 2022-08-10 DIAGNOSIS — Z01.818 PRE-OP TESTING: Primary | ICD-10-CM

## 2022-08-10 LAB — SARS-COV-2 ORF1AB RESP QL NAA+PROBE: NOT DETECTED

## 2022-08-10 PROCEDURE — U0005 INFEC AGEN DETEC AMPLI PROBE: HCPCS

## 2022-08-10 PROCEDURE — C9803 HOPD COVID-19 SPEC COLLECT: HCPCS

## 2022-08-10 PROCEDURE — U0004 COV-19 TEST NON-CDC HGH THRU: HCPCS

## 2022-08-12 ENCOUNTER — APPOINTMENT (OUTPATIENT)
Dept: GENERAL RADIOLOGY | Facility: HOSPITAL | Age: 82
End: 2022-08-12

## 2022-08-12 ENCOUNTER — ANESTHESIA EVENT (OUTPATIENT)
Dept: PERIOP | Facility: HOSPITAL | Age: 82
End: 2022-08-12

## 2022-08-12 ENCOUNTER — HOSPITAL ENCOUNTER (OUTPATIENT)
Facility: HOSPITAL | Age: 82
Setting detail: HOSPITAL OUTPATIENT SURGERY
Discharge: HOME | End: 2022-08-12
Attending: SURGERY | Admitting: SURGERY

## 2022-08-12 ENCOUNTER — ANESTHESIA (OUTPATIENT)
Dept: PERIOP | Facility: HOSPITAL | Age: 82
End: 2022-08-12

## 2022-08-12 VITALS
WEIGHT: 147 LBS | TEMPERATURE: 97.8 F | DIASTOLIC BLOOD PRESSURE: 54 MMHG | HEIGHT: 62 IN | HEART RATE: 57 BPM | SYSTOLIC BLOOD PRESSURE: 170 MMHG | BODY MASS INDEX: 27.05 KG/M2 | OXYGEN SATURATION: 98 % | RESPIRATION RATE: 16 BRPM

## 2022-08-12 DIAGNOSIS — I77.1 SUBCLAVIAN ARTERIAL STENOSIS: Primary | ICD-10-CM

## 2022-08-12 PROCEDURE — C1769 GUIDE WIRE: HCPCS | Performed by: SURGERY

## 2022-08-12 PROCEDURE — C1725 CATH, TRANSLUMIN NON-LASER: HCPCS | Performed by: SURGERY

## 2022-08-12 PROCEDURE — C1887 CATHETER, GUIDING: HCPCS | Performed by: SURGERY

## 2022-08-12 PROCEDURE — C1894 INTRO/SHEATH, NON-LASER: HCPCS | Performed by: SURGERY

## 2022-08-12 PROCEDURE — 0 IOPAMIDOL PER 1 ML: Performed by: SURGERY

## 2022-08-12 PROCEDURE — C1874 STENT, COATED/COV W/DEL SYS: HCPCS | Performed by: SURGERY

## 2022-08-12 PROCEDURE — 25010000002 HEPARIN (PORCINE) PER 1000 UNITS: Performed by: NURSE ANESTHETIST, CERTIFIED REGISTERED

## 2022-08-12 PROCEDURE — 0 LIDOCAINE 1 % SOLUTION 20 ML VIAL: Performed by: SURGERY

## 2022-08-12 PROCEDURE — 75710 ARTERY X-RAYS ARM/LEG: CPT

## 2022-08-12 PROCEDURE — 25010000002 HYDRALAZINE PER 20 MG: Performed by: NURSE ANESTHETIST, CERTIFIED REGISTERED

## 2022-08-12 PROCEDURE — 25010000002 ONDANSETRON PER 1 MG: Performed by: NURSE ANESTHETIST, CERTIFIED REGISTERED

## 2022-08-12 PROCEDURE — 25010000002 FENTANYL CITRATE (PF) 50 MCG/ML SOLUTION: Performed by: NURSE ANESTHETIST, CERTIFIED REGISTERED

## 2022-08-12 PROCEDURE — 25010000002 DEXAMETHASONE PER 1 MG: Performed by: NURSE ANESTHETIST, CERTIFIED REGISTERED

## 2022-08-12 PROCEDURE — 25010000002 PROPOFOL 10 MG/ML EMULSION: Performed by: NURSE ANESTHETIST, CERTIFIED REGISTERED

## 2022-08-12 PROCEDURE — 25010000002 HEPARIN (PORCINE) PER 1000 UNITS: Performed by: SURGERY

## 2022-08-12 PROCEDURE — 25010000002 CEFAZOLIN IN DEXTROSE 2-4 GM/100ML-% SOLUTION: Performed by: SURGERY

## 2022-08-12 PROCEDURE — 25010000002 PROTAMINE SULFATE PER 10 MG: Performed by: NURSE ANESTHETIST, CERTIFIED REGISTERED

## 2022-08-12 DEVICE — LIGACLIP MCA MULTIPLE CLIP APPLIERS, 20 SMALL CLIPS
Type: IMPLANTABLE DEVICE | Site: SUBCLAVIAN | Status: FUNCTIONAL
Brand: LIGACLIP

## 2022-08-12 DEVICE — STENTGR ENDOPROSTH VIABAHN VBX EXP 7F 7X11X29MM 135CM: Type: IMPLANTABLE DEVICE | Site: SUBCLAVIAN | Status: FUNCTIONAL

## 2022-08-12 RX ORDER — PROMETHAZINE HYDROCHLORIDE 25 MG/1
25 SUPPOSITORY RECTAL ONCE AS NEEDED
Status: DISCONTINUED | OUTPATIENT
Start: 2022-08-12 | End: 2022-08-12 | Stop reason: HOSPADM

## 2022-08-12 RX ORDER — HYDROCODONE BITARTRATE AND ACETAMINOPHEN 5; 325 MG/1; MG/1
1 TABLET ORAL ONCE AS NEEDED
Status: DISCONTINUED | OUTPATIENT
Start: 2022-08-12 | End: 2022-08-12 | Stop reason: HOSPADM

## 2022-08-12 RX ORDER — LIDOCAINE HYDROCHLORIDE 20 MG/ML
INJECTION, SOLUTION INFILTRATION; PERINEURAL AS NEEDED
Status: DISCONTINUED | OUTPATIENT
Start: 2022-08-12 | End: 2022-08-12 | Stop reason: SURG

## 2022-08-12 RX ORDER — HYDROCODONE BITARTRATE AND ACETAMINOPHEN 5; 325 MG/1; MG/1
1 TABLET ORAL EVERY 6 HOURS PRN
Qty: 5 TABLET | Refills: 0 | Status: SHIPPED | OUTPATIENT
Start: 2022-08-12 | End: 2022-12-09

## 2022-08-12 RX ORDER — DEXAMETHASONE SODIUM PHOSPHATE 4 MG/ML
INJECTION, SOLUTION INTRA-ARTICULAR; INTRALESIONAL; INTRAMUSCULAR; INTRAVENOUS; SOFT TISSUE AS NEEDED
Status: DISCONTINUED | OUTPATIENT
Start: 2022-08-12 | End: 2022-08-12 | Stop reason: SURG

## 2022-08-12 RX ORDER — LABETALOL HYDROCHLORIDE 5 MG/ML
5 INJECTION, SOLUTION INTRAVENOUS
Status: DISCONTINUED | OUTPATIENT
Start: 2022-08-12 | End: 2022-08-12 | Stop reason: HOSPADM

## 2022-08-12 RX ORDER — FENTANYL CITRATE 50 UG/ML
INJECTION, SOLUTION INTRAMUSCULAR; INTRAVENOUS AS NEEDED
Status: DISCONTINUED | OUTPATIENT
Start: 2022-08-12 | End: 2022-08-12 | Stop reason: SURG

## 2022-08-12 RX ORDER — FAMOTIDINE 10 MG/ML
20 INJECTION, SOLUTION INTRAVENOUS ONCE
Status: COMPLETED | OUTPATIENT
Start: 2022-08-12 | End: 2022-08-12

## 2022-08-12 RX ORDER — PROPOFOL 10 MG/ML
VIAL (ML) INTRAVENOUS AS NEEDED
Status: DISCONTINUED | OUTPATIENT
Start: 2022-08-12 | End: 2022-08-12 | Stop reason: SURG

## 2022-08-12 RX ORDER — FLUMAZENIL 0.1 MG/ML
0.2 INJECTION INTRAVENOUS AS NEEDED
Status: DISCONTINUED | OUTPATIENT
Start: 2022-08-12 | End: 2022-08-12 | Stop reason: HOSPADM

## 2022-08-12 RX ORDER — EPHEDRINE SULFATE 50 MG/ML
5 INJECTION, SOLUTION INTRAVENOUS ONCE AS NEEDED
Status: DISCONTINUED | OUTPATIENT
Start: 2022-08-12 | End: 2022-08-12 | Stop reason: HOSPADM

## 2022-08-12 RX ORDER — FENTANYL CITRATE 50 UG/ML
50 INJECTION, SOLUTION INTRAMUSCULAR; INTRAVENOUS
Status: DISCONTINUED | OUTPATIENT
Start: 2022-08-12 | End: 2022-08-12 | Stop reason: HOSPADM

## 2022-08-12 RX ORDER — ONDANSETRON 2 MG/ML
4 INJECTION INTRAMUSCULAR; INTRAVENOUS ONCE AS NEEDED
Status: DISCONTINUED | OUTPATIENT
Start: 2022-08-12 | End: 2022-08-12 | Stop reason: HOSPADM

## 2022-08-12 RX ORDER — HYDRALAZINE HYDROCHLORIDE 20 MG/ML
5 INJECTION INTRAMUSCULAR; INTRAVENOUS
Status: DISCONTINUED | OUTPATIENT
Start: 2022-08-12 | End: 2022-08-12 | Stop reason: HOSPADM

## 2022-08-12 RX ORDER — SODIUM CHLORIDE 0.9 % (FLUSH) 0.9 %
3 SYRINGE (ML) INJECTION EVERY 12 HOURS SCHEDULED
Status: DISCONTINUED | OUTPATIENT
Start: 2022-08-12 | End: 2022-08-12 | Stop reason: HOSPADM

## 2022-08-12 RX ORDER — ONDANSETRON 2 MG/ML
INJECTION INTRAMUSCULAR; INTRAVENOUS AS NEEDED
Status: DISCONTINUED | OUTPATIENT
Start: 2022-08-12 | End: 2022-08-12 | Stop reason: SURG

## 2022-08-12 RX ORDER — SODIUM CHLORIDE, SODIUM LACTATE, POTASSIUM CHLORIDE, CALCIUM CHLORIDE 600; 310; 30; 20 MG/100ML; MG/100ML; MG/100ML; MG/100ML
9 INJECTION, SOLUTION INTRAVENOUS CONTINUOUS
Status: DISCONTINUED | OUTPATIENT
Start: 2022-08-12 | End: 2022-08-12 | Stop reason: HOSPADM

## 2022-08-12 RX ORDER — PROPOFOL 10 MG/ML
VIAL (ML) INTRAVENOUS CONTINUOUS PRN
Status: DISCONTINUED | OUTPATIENT
Start: 2022-08-12 | End: 2022-08-12 | Stop reason: SURG

## 2022-08-12 RX ORDER — DIPHENHYDRAMINE HYDROCHLORIDE 50 MG/ML
12.5 INJECTION INTRAMUSCULAR; INTRAVENOUS
Status: DISCONTINUED | OUTPATIENT
Start: 2022-08-12 | End: 2022-08-12 | Stop reason: HOSPADM

## 2022-08-12 RX ORDER — FENTANYL CITRATE 50 UG/ML
25 INJECTION, SOLUTION INTRAMUSCULAR; INTRAVENOUS
Status: DISCONTINUED | OUTPATIENT
Start: 2022-08-12 | End: 2022-08-12 | Stop reason: HOSPADM

## 2022-08-12 RX ORDER — IBUPROFEN 600 MG/1
600 TABLET ORAL ONCE AS NEEDED
Status: DISCONTINUED | OUTPATIENT
Start: 2022-08-12 | End: 2022-08-12 | Stop reason: HOSPADM

## 2022-08-12 RX ORDER — NALOXONE HCL 0.4 MG/ML
0.2 VIAL (ML) INJECTION AS NEEDED
Status: DISCONTINUED | OUTPATIENT
Start: 2022-08-12 | End: 2022-08-12 | Stop reason: HOSPADM

## 2022-08-12 RX ORDER — SODIUM CHLORIDE 0.9 % (FLUSH) 0.9 %
3-10 SYRINGE (ML) INJECTION AS NEEDED
Status: DISCONTINUED | OUTPATIENT
Start: 2022-08-12 | End: 2022-08-12 | Stop reason: HOSPADM

## 2022-08-12 RX ORDER — HEPARIN SODIUM 1000 [USP'U]/ML
INJECTION, SOLUTION INTRAVENOUS; SUBCUTANEOUS AS NEEDED
Status: DISCONTINUED | OUTPATIENT
Start: 2022-08-12 | End: 2022-08-12 | Stop reason: SURG

## 2022-08-12 RX ORDER — CEFAZOLIN SODIUM 2 G/100ML
2 INJECTION, SOLUTION INTRAVENOUS ONCE
Status: COMPLETED | OUTPATIENT
Start: 2022-08-12 | End: 2022-08-12

## 2022-08-12 RX ORDER — PROTAMINE SULFATE 10 MG/ML
INJECTION, SOLUTION INTRAVENOUS AS NEEDED
Status: DISCONTINUED | OUTPATIENT
Start: 2022-08-12 | End: 2022-08-12 | Stop reason: SURG

## 2022-08-12 RX ORDER — LIDOCAINE HYDROCHLORIDE 10 MG/ML
0.5 INJECTION, SOLUTION EPIDURAL; INFILTRATION; INTRACAUDAL; PERINEURAL ONCE AS NEEDED
Status: DISCONTINUED | OUTPATIENT
Start: 2022-08-12 | End: 2022-08-12 | Stop reason: HOSPADM

## 2022-08-12 RX ORDER — DIPHENHYDRAMINE HCL 25 MG
25 CAPSULE ORAL
Status: DISCONTINUED | OUTPATIENT
Start: 2022-08-12 | End: 2022-08-12 | Stop reason: HOSPADM

## 2022-08-12 RX ORDER — PROMETHAZINE HYDROCHLORIDE 25 MG/1
25 TABLET ORAL ONCE AS NEEDED
Status: DISCONTINUED | OUTPATIENT
Start: 2022-08-12 | End: 2022-08-12 | Stop reason: HOSPADM

## 2022-08-12 RX ADMIN — FENTANYL CITRATE 50 MCG: 50 INJECTION INTRAMUSCULAR; INTRAVENOUS at 13:44

## 2022-08-12 RX ADMIN — LIDOCAINE HYDROCHLORIDE 100 MG: 20 INJECTION, SOLUTION INFILTRATION; PERINEURAL at 13:25

## 2022-08-12 RX ADMIN — CEFAZOLIN SODIUM 2 G: 2 INJECTION, SOLUTION INTRAVENOUS at 13:23

## 2022-08-12 RX ADMIN — HYDRALAZINE HYDROCHLORIDE 5 MG: 20 INJECTION INTRAMUSCULAR; INTRAVENOUS at 15:28

## 2022-08-12 RX ADMIN — DEXAMETHASONE SODIUM PHOSPHATE 8 MG: 4 INJECTION, SOLUTION INTRA-ARTICULAR; INTRALESIONAL; INTRAMUSCULAR; INTRAVENOUS; SOFT TISSUE at 13:43

## 2022-08-12 RX ADMIN — SODIUM CHLORIDE, POTASSIUM CHLORIDE, SODIUM LACTATE AND CALCIUM CHLORIDE 9 ML/HR: 600; 310; 30; 20 INJECTION, SOLUTION INTRAVENOUS at 11:14

## 2022-08-12 RX ADMIN — HEPARIN SODIUM 7500 UNITS: 1000 INJECTION INTRAVENOUS; SUBCUTANEOUS at 13:49

## 2022-08-12 RX ADMIN — ONDANSETRON 4 MG: 2 INJECTION INTRAMUSCULAR; INTRAVENOUS at 14:34

## 2022-08-12 RX ADMIN — PROTAMINE SULFATE 40 MG: 10 INJECTION, SOLUTION INTRAVENOUS at 14:27

## 2022-08-12 RX ADMIN — IOPAMIDOL 37 ML: 510 INJECTION, SOLUTION INTRAVASCULAR at 14:11

## 2022-08-12 RX ADMIN — Medication 60 MG: at 13:25

## 2022-08-12 RX ADMIN — FAMOTIDINE 20 MG: 10 INJECTION INTRAVENOUS at 11:14

## 2022-08-12 RX ADMIN — PROPOFOL 80 MCG/KG/MIN: 10 INJECTION, EMULSION INTRAVENOUS at 13:29

## 2022-08-12 NOTE — H&P
Name: Elizabeth Whaley ADMIT: 2022   : 1940  PCP: Gerda Wadsworth MD    MRN: [id number] LOS: 0 days   AGE/SEX: 81 y.o. female  ROOM: [facility]    Pre-operative H&P    Patient Care Team:  Gerda Wadsworth MD as PCP - General  Dilshad Domingo MD as Surgeon (Vascular Surgery)  No chief complaint on file.    CC: Preop subclavian stent placement.    Subjective     Patient with slow healing left necrotic infection of her index finger with known subclavian artery stenosis/occlusion.    Review of Systems   All other systems reviewed and are negative.      Past Medical History:   Diagnosis Date   • Anxiety    • Arthritis    • Atrial fibrillation (HCC)    • Cancer (HCC)     Left breast Cancer and Skin cancer   • Coronary artery disease    • Frequent UTI    • GERD (gastroesophageal reflux disease)    • Hyperlipidemia    • Hypertension    • MRSA infection 2022    LEFT FINGER/ [facility] [place]   • Non-healing skin lesion     LEFT INDEX FINGER   • Palpitations    • Sleep apnea      Past Surgical History:   Procedure Laterality Date   • BREAST LUMPECTOMY Left    • CARDIAC CATHETERIZATION     • CAROTID ENDARTERECTOMY Left 2022    Procedure: LEFT CAROTID ENDARTERECTOMY;  Surgeon: Dilshad Domingo MD;  Location: [facility];  Service: Vascular;  Laterality: Left;   • CHOLECYSTECTOMY     • CORONARY ARTERY BYPASS GRAFT     • HYSTERECTOMY       Family History   Problem Relation Age of Onset   • Diabetes Mother    • Malig Hyperthermia Neg Hx      Social History     Tobacco Use   • Smoking status: Former Smoker     Packs/day: 1.50     Years: 50.00     Pack years: 75.00     Types: Cigarettes     Quit date:      Years since quittin.6   • Smokeless tobacco: Never Used   Vaping Use   • Vaping Use: Never used   Substance Use Topics   • Alcohol use: Yes     Alcohol/week: 7.0 standard drinks     Types: 7 Glasses of wine per week     Comment: routinely    •  Drug use: No     Medications Prior to Admission   Medication Sig Dispense Refill Last Dose   • Chlorhexidine Gluconate Cloth 2 % pads Apply  topically Take As Directed.   8/12/2022 at 0800   • escitalopram (LEXAPRO) 5 MG tablet Take 5 mg by mouth Every Night.   8/11/2022 at 2200   • ezetimibe (ZETIA) 10 MG tablet TAKE 1 TABLET BY MOUTH DAILY 90 tablet 1 8/11/2022 at 0930   • losartan (COZAAR) 50 MG tablet Take 75 mg by mouth Daily. Take 1 1/2 tab daily   8/11/2022 at 0730   • metoprolol succinate XL (TOPROL-XL) 25 MG 24 hr tablet TAKE 1 TABLET BY MOUTH DAILY (Patient taking differently: Take 25 mg by mouth Every Night.) 90 tablet 2 8/11/2022 at 2200   • nitrofurantoin (MACRODANTIN) 50 MG capsule Take 50 mg by mouth Every Night. Daily for UTI prevention  4 8/11/2022 at 2200   • pantoprazole (Protonix) 40 MG EC tablet Take 1 tablet by mouth Daily. 90 tablet 3 8/11/2022 at 0930   • rosuvastatin (CRESTOR) 10 MG tablet TAKE 1 TABLET BY MOUTH DAILY 90 tablet 3 8/11/2022 at 0930   • temazepam (RESTORIL) 15 MG capsule Take 15 mg by mouth At Night As Needed.   8/12/2022 at 130   • vitamin D3 125 MCG (5000 UT) capsule capsule Take 5,000 Units by mouth Daily.   8/11/2022 at 0930   • aspirin 81 MG tablet Take 81 mg by mouth Every Night. HOLD DOS   8/10/2022     ceFAZolin, 2 g, Intravenous, Once  sodium chloride, 3 mL, Intravenous, Q12H      lactated ringers, 9 mL/hr, Last Rate: 9 mL/hr (08/12/22 1114)      fentanyl  •  lidocaine PF 1%  •  sodium chloride  Codeine, Levaquin [levofloxacin], Penicillins, and Sulfa antibiotics    Objective     Physical Exam:  Physical Exam  Constitutional:       Appearance: She is well-developed.   Pulmonary:      Effort: Pulmonary effort is normal. No respiratory distress.   Abdominal:      General: There is no distension.      Palpations: Abdomen is soft.   Neurological:      Mental Status: She is alert and oriented to person, place, and time.     1+ pulse left radial.  2+ pulse right  "radial.    Vital Signs and Labs:  Vital Signs Patient Vitals for the past 24 hrs:   BP Temp Temp src Pulse Resp SpO2 Height Weight   08/12/22 1017 140/64 98.3 °F (36.8 °C) Oral 53 16 100 % 157.5 cm (62\") 66.7 kg (147 lb)     I/O:  No intake/output data recorded.    CBC      BMP     Cr Clearance Estimated Creatinine Clearance: 63.8 mL/min (by C-G formula based on SCr of 0.63 mg/dL).  Coag     HbA1C No results found for: HGBA1C  Blood Glucose No results found for: POCGLU  Infection     CMP     ABG      UA      AURELIA  No results found for: POCMETH, POCAMPHET, POCBARBITUR, POCBENZO, POCCOCAINE, POCOPIATES, POCOXYCODO, POCPHENCYC, POCPROPOXY, POCTHC, POCTRICYC  Radiology(recent) No radiology results for the last day    There are no hospital problems to display for this patient.      Assessment & Plan       * No active hospital problems. *      81 y.o. female severe stenosis of the left subclavian artery with retrograde vertebral flow and slow healing index finger infection.  I discussed with her about retrograde arm access with stent placement that.  She understands the risks and benefits of the proposed procedure.    I discussed the patients findings and my recommendations with patient, family and nursing staff.    Dilshad Domingo MD  08/12/22  12:31 EDT    Please call my office with any question: [phone]            "

## 2022-08-12 NOTE — DISCHARGE INSTRUCTIONS
[facility]  Richi Cobian, Fe Nevarez Scherrer, [place], Lina  [address], [address]  [phone]    Post-Operative Instructions for vascular  Diet: Regular Diet    Medications: Take your regularly scheduled medications on the day of your surgery, unless your doctor has directed you otherwise. You may be sent home with a prescription for pain medication, follow the directions as prescribed.    Activity Restrictions / Driving: Avoid lifting more than 15 pounds or other activities that stress or compress the access area. No driving for the remainder of the day after surgery. You may drive when you no longer are taking narcotic pain medications. If a nerve block was done to numb your arm for surgery, you will be placed in an arm sling.  This numbness and inability to move the arm can last for as little as 6 hours but as many as 18.  The sling should be used during this time but can be removed when sensation and movement of your arm is normal and does not need to be used after that. Use of the arm is encouraged after the surgery.    Incision Care: Some bruising is normal. If you have drainage from the incision please notify the office. Dressing should be removed in 48 hours. After dressing is removed, it is OK to shower. Do not submerge incision until cleared by your surgeon (bath or swimming).    Bathing and Showering: You may shower after you remove your dressing.    Follow-up Appointments: You will need to return to the office for a follow-up visit within 1-3 weeks after your surgery. Please make sure you have your appointment scheduled, call [phone].    The patient (you) should:  1. Avoid wearing tight constrictive clothing over that arm.  2. Avoid wearing jewelry that is tight, such as a watch on the access arm.  3. Avoid carrying heavy objects.  4. Avoid purse straps over the fistula.  5. Avoid sleeping on the arm or keeping it bent for extended periods of time.  6. Each day,  "using your opposite hand, feel over the fistula for the \"thrill\" or vibration that is normally present.    Call the office for the followin. Fever greater than 101.0  2. Uncontrolled pain. This is on a scale of 1-10 (10 being the worst pain imaginable) your pain is a level 7 or above.  3. It is important that you notify our office if you are having numbness and significant pain in the extremity in which you have just had surgery!  4. Decreased or absent thrill.  5. Nausea, diarrhea, and/or vomiting that continue for 12-24 hours.  6. Signs of an infection: redness, increased swelling, drainage, fever and/or chills.  7. Chest pain or difficulty breathing.    If you have further questions after reading this handout, the office is open from 8:30am to 5:00pm Monday through Friday. Call [phone].   "

## 2022-08-12 NOTE — OP NOTE
Date of Admission:  8/12/2022  Today's Date:  08/12/22  Dilshad Domingo MD  [facility]    Preoperative Diagnosis:   Severe left subclavian artery stenosis with retrograde left vertebral flow.  Slow healing left finger wound.    Postoperative Diagnosis:   Same    Procedure Performed:   Left brachial cutdown with primary arterial repair.  A sending aortic catheter placement.  Aortic arch arteriogram.  Subclavian artery arteriogram.  Subclavian artery stent graft placement with 7 x 29 Viabahn VBX stent graft.    Surgeon:   Dilshad Domingo MD    Assistant:    Laurie Coleman RN    Anesthesia:   General    Estimated Blood Loss:   Minimal    Findings:    Successful endovascular stent grafting of a severe near occlusive subclavian artery stenosis.  Preservation of flow to the left vertebral artery in an antegrade manner.    Triphasic radial and ulnar signal at case completion.    Implants:    Implant Name Type Inv. Item Serial No.  Lot No. LRB No. Used Action   CLIPAPPLR M/ ENDO LIGACLIP 9 3/8IN SM - [id number] Implant CLIPAPPLR M/ ENDO LIGACLIP 9 3/8IN   [employer] . Left 1 Implanted   STENTGR ENDOPROSTH VIABAHN VBX EXP 7F [id number] - [id number] - [id number] Implant STENTGR ENDOPROSTH VIABAHN VBX EXP 7F [id number] 135CM [id number] [employer]  . Left 1 Implanted       Staff:   Circulator: Neelima Mckeon RN  Radiology Technologist: Nissa Hitchcock  Scrub Person: Sarina Orozco  Assistant: Laurie Coleman  Vascular Radiology Technician: Jordyn Packer    Specimen:   none    Complications:   none    Dispo:   to PACU    Indication for procedure:   81 y.o. female with severe cerebrovascular disease.  She is relatively recent from a left carotid endarterectomy.  She was noted to have retrograde vertebral flow on a postoperative duplex in the vertebral artery.  She also has a very slow healing left finger wound being taken care of by the hand surgeons.  Risk benefits alternatives  to endovascular revascularization discussed.  Informed consent obtained.    Description of procedure:   Patient was taken to the operating room, anesthesia induced without difficulty. Surgical sites prepped and draped in the usual sterile manner. A full surgical timeout was performed. Ultrasound was used to evaluate the brachial bifurcation. It was near the antecubital. A small, about 1 inch, incision was made over this. Bovie with electrocautery was used to dissect down to the aponeurosis and this was opened up vertically exposing the brachial artery. Heparinization was performed. MicroSheath access performed, ultimately upsizing to 7-Eritrean sheath. I was unable to cross the near occlusive lesion of the subclavian artery with 0.035 Glidewire. I was able to get across with an 0.018 Advantage Glidewire. Catheter placed in the ascending aorta and an arteriogram was performed in oblique projection. This showed the essentially occlusive stenosis at the subclavian artery origin. Over the guidewire I placed a 4-Eritrean Иван sheath into the subclavian artery and performed a subclavian artery angiogram to find the vertebral anatomy. A 4 mm balloon predilatation was performed and a 7 x 29 Viabahn VBX stent graft was placed over the severe occlusion with slight intraaortic arch component. This resolved the stenosis on subclavian artery arteriogram. All wires, catheters, and sheaths were removed. Arteriotomy was closed with a 7-0 suture. Appropriate flushing maneuvers were taken prior to restoration of flow. Protamine administered. Good hemostasis was confirmed. Deep tissues closed with 3-0 Vicryl, skin running closed with 4-0 Vicryl in subcuticular manner. At case completion all counts were correct x 2.     At case completion all instrument count, needle count, and sponge counts were correct x2.    Dilshad Domingo MD  08/12/22     There are no hospital problems to display for this patient.

## 2022-08-12 NOTE — NURSING NOTE
Notified Dr Gee ALBERT that pt took her Metoprolol last pm at 2200. No additional orders given.     Pt has 2nd digit covered in dressing. Stated it was an infected nail she has had for 5 weeks. Pt states Dr Domingo aware. Dr Gee ALBERT aware of infection.

## 2022-08-12 NOTE — ANESTHESIA POSTPROCEDURE EVALUATION
"Patient: Elizabeth Whaley    Procedure Summary     Date: 08/12/22 [facility] / [facility]    Anesthesia Start: 1313 Anesthesia Stop: 1449    Procedures:       LEFT ARM CUTDOWN, (Left )      LEFT SUBCLAVIAN ARTERY STENT PLACEMENT (Left ) Diagnosis:     Surgeons: Dilshad Domingo MD Provider: Veto Jurado MD    Anesthesia Type: MAC ASA Status: 3          Anesthesia Type: MAC    Vitals  Vitals Value Taken Time   /59 08/12/22 1538   Temp 36.6 °C (97.8 °F) 08/12/22 1445   Pulse 56 08/12/22 1545   Resp 16 08/12/22 1500   SpO2 99 % 08/12/22 1545   Vitals shown include unvalidated device data.        Post Anesthesia Care and Evaluation    Patient location during evaluation: bedside  Patient participation: complete - patient participated  Level of consciousness: awake and alert  Pain management: adequate    Airway patency: patent  Anesthetic complications: No anesthetic complications    Cardiovascular status: acceptable  Respiratory status: acceptable  Hydration status: acceptable    Comments: /54   Pulse 57   Temp 36.6 °C (97.8 °F) (Oral)   Resp 16   Ht 157.5 cm (62\")   Wt 66.7 kg (147 lb)   SpO2 98%   BMI 26.89 kg/m²       "

## 2022-08-12 NOTE — ANESTHESIA PREPROCEDURE EVALUATION
Anesthesia Evaluation     Patient summary reviewed and Nursing notes reviewed   history of anesthetic complications (has had TMD syptoms and claims a broken right lower molar from intubation during LCEA a few months ago):  NPO Solid Status: > 8 hours  NPO Liquid Status: > 8 hours           Airway   Mallampati: II  TM distance: >3 FB  Neck ROM: full  No difficulty expected  Comment: Previous grade 1 view with MAC 3   Dental - normal exam   (+) poor dentition        Pulmonary    (+) sleep apnea,   (-) rhonchi, decreased breath sounds, wheezes, not a smoker  Cardiovascular   Exercise tolerance: good (4-7 METS)    Rhythm: regular  Rate: normal    (+) hypertension, CAD, CABG, dysrhythmias Atrial Fib, hyperlipidemia,  carotid artery disease carotid bilateral  (-) angina, GOULD, murmur      Neuro/Psych  (-) seizures, CVA  GI/Hepatic/Renal/Endo    (+)  GERD,    (-) no renal disease, diabetes    Musculoskeletal         ROS comment: Poorly healing left 2nd digit s/p debridement   Abdominal     Abdomen: soft.   Substance History      OB/GYN          Other   arthritis,                    Anesthesia Plan    ASA 3     MAC   total IV anesthesia  intravenous induction     Anesthetic plan, risks, benefits, and alternatives have been provided, discussed and informed consent has been obtained with: patient.        CODE STATUS:

## 2022-08-15 RX ORDER — METOPROLOL SUCCINATE 25 MG/1
25 TABLET, EXTENDED RELEASE ORAL DAILY
Qty: 90 TABLET | Refills: 2 | Status: SHIPPED | OUTPATIENT
Start: 2022-08-15

## 2022-08-29 RX ORDER — EZETIMIBE 10 MG/1
10 TABLET ORAL DAILY
Qty: 90 TABLET | Refills: 1 | Status: SHIPPED | OUTPATIENT
Start: 2022-08-29 | End: 2023-02-27

## 2022-09-12 ENCOUNTER — APPOINTMENT (OUTPATIENT)
Dept: VASCULAR SURGERY | Facility: HOSPITAL | Age: 82
End: 2022-09-12

## 2022-09-12 PROCEDURE — G0463 HOSPITAL OUTPT CLINIC VISIT: HCPCS

## 2022-09-13 ENCOUNTER — TRANSCRIBE ORDERS (OUTPATIENT)
Dept: ADMINISTRATIVE | Facility: HOSPITAL | Age: 82
End: 2022-09-13

## 2022-09-13 DIAGNOSIS — I65.23 BILATERAL CAROTID ARTERY OCCLUSION: Primary | ICD-10-CM

## 2022-11-03 ENCOUNTER — OFFICE (AMBULATORY)
Dept: URBAN - METROPOLITAN AREA PATHOLOGY 4 | Facility: PATHOLOGY | Age: 82
End: 2022-11-03
Payer: COMMERCIAL

## 2022-11-03 ENCOUNTER — OFFICE (AMBULATORY)
Dept: URBAN - METROPOLITAN AREA PATHOLOGY 4 | Facility: PATHOLOGY | Age: 82
End: 2022-11-03

## 2022-11-03 ENCOUNTER — ON CAMPUS - OUTPATIENT (AMBULATORY)
Dept: URBAN - METROPOLITAN AREA HOSPITAL 2 | Facility: HOSPITAL | Age: 82
End: 2022-11-03

## 2022-11-03 VITALS
HEART RATE: 90 BPM | WEIGHT: 144 LBS | HEART RATE: 81 BPM | SYSTOLIC BLOOD PRESSURE: 127 MMHG | SYSTOLIC BLOOD PRESSURE: 101 MMHG | SYSTOLIC BLOOD PRESSURE: 115 MMHG | HEART RATE: 72 BPM | SYSTOLIC BLOOD PRESSURE: 117 MMHG | DIASTOLIC BLOOD PRESSURE: 74 MMHG | DIASTOLIC BLOOD PRESSURE: 49 MMHG | HEART RATE: 86 BPM | RESPIRATION RATE: 18 BRPM | DIASTOLIC BLOOD PRESSURE: 57 MMHG | HEART RATE: 69 BPM | OXYGEN SATURATION: 99 % | OXYGEN SATURATION: 100 % | HEART RATE: 70 BPM | RESPIRATION RATE: 17 BRPM | TEMPERATURE: 96.9 F | DIASTOLIC BLOOD PRESSURE: 65 MMHG | RESPIRATION RATE: 20 BRPM | RESPIRATION RATE: 16 BRPM | HEIGHT: 62 IN | SYSTOLIC BLOOD PRESSURE: 103 MMHG | SYSTOLIC BLOOD PRESSURE: 153 MMHG | SYSTOLIC BLOOD PRESSURE: 126 MMHG | DIASTOLIC BLOOD PRESSURE: 47 MMHG | DIASTOLIC BLOOD PRESSURE: 55 MMHG | OXYGEN SATURATION: 97 %

## 2022-11-03 DIAGNOSIS — D12.3 BENIGN NEOPLASM OF TRANSVERSE COLON: ICD-10-CM

## 2022-11-03 DIAGNOSIS — K64.1 SECOND DEGREE HEMORRHOIDS: ICD-10-CM

## 2022-11-03 DIAGNOSIS — D12.5 BENIGN NEOPLASM OF SIGMOID COLON: ICD-10-CM

## 2022-11-03 DIAGNOSIS — K57.30 DIVERTICULOSIS OF LARGE INTESTINE WITHOUT PERFORATION OR ABS: ICD-10-CM

## 2022-11-03 DIAGNOSIS — Z86.010 PERSONAL HISTORY OF COLONIC POLYPS: ICD-10-CM

## 2022-11-03 PROBLEM — K63.5 POLYP OF COLON: Status: ACTIVE | Noted: 2022-11-03

## 2022-11-03 LAB
GI HISTOLOGY: A. UNSPECIFIED: (no result)
GI HISTOLOGY: B. UNSPECIFIED: (no result)
GI HISTOLOGY: PDF REPORT: (no result)

## 2022-11-03 PROCEDURE — 88305 TISSUE EXAM BY PATHOLOGIST: CPT | Mod: 26 | Performed by: INTERNAL MEDICINE

## 2022-11-03 PROCEDURE — 45385 COLONOSCOPY W/LESION REMOVAL: CPT | Mod: PT | Performed by: INTERNAL MEDICINE

## 2022-11-03 PROCEDURE — 45390 COLONOSCOPY W/RESECTION: CPT | Mod: 59,PT | Performed by: INTERNAL MEDICINE

## 2022-11-03 NOTE — SERVICEHPINOTES
GLORIA VIRK  is a  81  female   who presents today for a  Colonoscopy   for   the indications listed below. The updated Patient Profile was reviewed prior to the procedure, in conjunction with the Physical Exam, including medical conditions, surgical procedures, medications, allergies, family history and social history. See Physical Exam time stamp below for date and time of HPI completion.Pre-operatively, I reviewed the indication(s) for the procedure, the risks of the procedure [including but not limited to: unexpected bleeding possibly requiring hospitalization and/or unplanned repeat procedures, perforation possibly requiring surgical treatment, missed lesions and complications of sedation/MAC (also explained by anesthesia staff)]. I have evaluated the patient for risks associated with the planned anesthesia and the procedure to be performed and find the patient an acceptable candidate for IV sedation.Multiple opportunities were provided for any questions or concerns, and all questions were answered satisfactorily before any anesthesia was administered. We will proceed with the planned procedure.br

## 2022-12-02 ENCOUNTER — HOSPITAL ENCOUNTER (OUTPATIENT)
Facility: HOSPITAL | Age: 82
Discharge: HOME | End: 2022-12-02
Attending: EMERGENCY MEDICINE | Admitting: EMERGENCY MEDICINE

## 2022-12-02 VITALS
RESPIRATION RATE: 16 BRPM | BODY MASS INDEX: 26.87 KG/M2 | SYSTOLIC BLOOD PRESSURE: 181 MMHG | HEIGHT: 62 IN | HEART RATE: 66 BPM | WEIGHT: 146 LBS | DIASTOLIC BLOOD PRESSURE: 58 MMHG | TEMPERATURE: 97.9 F | OXYGEN SATURATION: 98 %

## 2022-12-02 DIAGNOSIS — S81.802A WOUND OF LEFT LOWER EXTREMITY, INITIAL ENCOUNTER: Primary | ICD-10-CM

## 2022-12-02 PROCEDURE — 99203 OFFICE O/P NEW LOW 30 MIN: CPT | Performed by: EMERGENCY MEDICINE

## 2022-12-02 PROCEDURE — G0463 HOSPITAL OUTPT CLINIC VISIT: HCPCS | Performed by: EMERGENCY MEDICINE

## 2022-12-02 RX ORDER — DIAPER,BRIEF,INFANT-TODD,DISP
1 EACH MISCELLANEOUS ONCE
Status: COMPLETED | OUTPATIENT
Start: 2022-12-02 | End: 2022-12-02

## 2022-12-02 RX ORDER — CLINDAMYCIN HYDROCHLORIDE 300 MG/1
300 CAPSULE ORAL 3 TIMES DAILY
Qty: 15 CAPSULE | Refills: 0 | Status: SHIPPED | OUTPATIENT
Start: 2022-12-02 | End: 2022-12-07

## 2022-12-02 RX ADMIN — Medication 1 APPLICATION: at 09:35

## 2022-12-02 NOTE — FSED PROVIDER NOTE
Subjective   History of Present Illness  Pt w a skin tear to L shin area last week.  Has been applying topical abx and using h202 with a clean the area.  Denies fevers or chills or worsening redness or drainage.  There are some bruising around the area but she tells me that she has bruising all over her arms too.  This occurred while she hit it on a tote item.  She can still stand, ambulate successfully.  Has no pain        Review of Systems   All other systems reviewed and are negative.      Past Medical History:   Diagnosis Date   • Anxiety    • Arthritis    • Atrial fibrillation (HCC)    • Cancer (HCC)     Left breast Cancer and Skin cancer   • Coronary artery disease    • Frequent UTI    • GERD (gastroesophageal reflux disease)    • Hyperlipidemia    • Hypertension    • MRSA infection 07/06/2022    LEFT FINGER/ [facility] [place]   • Non-healing skin lesion     LEFT INDEX FINGER   • Palpitations    • Sleep apnea        Allergies   Allergen Reactions   • Codeine Unknown - Low Severity     Pt Cannot remember   • Levaquin [Levofloxacin] GI Intolerance   • Penicillins Rash   • Sulfa Antibiotics Unknown (See Comments) and Rash       Past Surgical History:   Procedure Laterality Date   • ANGIOPLASTY SUBCLAVIAN ARTERY Left 8/12/2022    Procedure: LEFT SUBCLAVIAN ARTERY STENT PLACEMENT;  Surgeon: Dilshad Domingo MD;  Location: On license of [facility] OR 18/19;  Service: Vascular;  Laterality: Left;   • BREAST LUMPECTOMY Left    • CARDIAC CATHETERIZATION     • CAROTID ENDARTERECTOMY Left 4/14/2022    Procedure: LEFT CAROTID ENDARTERECTOMY;  Surgeon: Dilshad Domingo MD;  Location: [facility];  Service: Vascular;  Laterality: Left;   • CHOLECYSTECTOMY     • CORONARY ARTERY BYPASS GRAFT     • HYSTERECTOMY         Family History   Problem Relation Age of Onset   • Diabetes Mother    • Malig Hyperthermia Neg Hx        Social History     Socioeconomic History   • Marital status:    Tobacco Use   •  Smoking status: Former     Packs/day: 1.50     Years: 50.00     Pack years: 75.00     Types: Cigarettes     Quit date:      Years since quittin.9   • Smokeless tobacco: Never   Vaping Use   • Vaping Use: Never used   Substance and Sexual Activity   • Alcohol use: Yes     Alcohol/week: 7.0 standard drinks     Types: 7 Glasses of wine per week     Comment: routinely    • Drug use: No   • Sexual activity: Defer           Objective   Physical Exam  Vitals and nursing note reviewed.   Constitutional:       Appearance: Normal appearance.   HENT:      Head: Normocephalic.      Right Ear: External ear normal.      Left Ear: External ear normal.      Nose: Nose normal.   Eyes:      Conjunctiva/sclera: Conjunctivae normal.   Cardiovascular:      Rate and Rhythm: Normal rate.   Pulmonary:      Effort: Pulmonary effort is normal.   Abdominal:      General: There is no distension.   Musculoskeletal:      Cervical back: Normal range of motion.   Skin:     Findings: No rash.      Comments: Well demarcated, healing skin tear, superficial.  No drainage.  Granulating tissue present   Neurological:      Mental Status: She is alert and oriented to person, place, and time.   Psychiatric:         Mood and Affect: Mood normal.         Procedures           ED Course                                           MDM  Number of Diagnoses or Management Options  Wound of left lower extremity, initial encounter  Diagnosis management comments: We will dressed the wound.  Looks clean.  She cannot have penicillin or Keflex products.  Follow-up in the outpatient setting.  Will start clindamycin if gets worse.  Watch and wait prescription      Final diagnoses:   Wound of left lower extremity, initial encounter       ED Disposition  ED Disposition     ED Disposition   Discharge    Condition   Stable    Comment   --             Gerda Wadsworth MD  [address] [place] [address]  [address] [place] [address]  [phone]                Medication List      New Prescriptions    clindamycin 300 MG capsule  Commonly known as: CLEOCIN  Take 1 capsule by mouth 3 (Three) Times a Day for 5 days.           Where to Get Your Medications      These medications were sent to [id number] - [place], [place] - [address] [place] [address] AT [address] [place] [address] - [phone]  - [phone] [address] [place] [address] [place] [id number]    Phone: [phone]   · clindamycin 300 MG capsule

## 2022-12-02 NOTE — DISCHARGE INSTRUCTIONS
If pain or swelling gets worse to the left leg, start antibiotics.  And then get rechecked.  Talk to the pharmacist or your family doctor about starting an over-the-counter probiotic if using the antibiotic

## 2022-12-08 NOTE — PROGRESS NOTES
Cardiology Office Visit      Encounter Date:  12/09/2022    Patient ID:   Elizabeth Whaley is a 82 y.o. female.    Reason For Followup:  Coronary artery disease  Hypertension  Hypertensive cardiovascular disease  Hyperlipidemia  Antiplatelet therapy    Brief Clinical History:  Dear Dr. Wadsworth, Gerda Diamond MD    I had the pleasure of seeing Elizabeth Whaley today. As you are well aware, this is a 82 y.o. female with a history of coronary artery disease she is undergone coronary arterial bypass grafting in 2015.  She has additional history that includes hypertension, hypertensive cardiovascular disease, hyperlipidemia, and antiplatelet therapy.  She presents today for follow-up on the above conditions.    Interval History:  She denies any chest pain pressure heaviness or tightness.  She denies any shortness of breath.  She denies any PND orthopnea.  She denies any syncope or near syncope.  Other than some occasional hot flashes, she reports feeling well from a cardiac perspective.    We had a discussion regarding blood thinners because of scrape that she had on her leg.  She reports that in all reality, the amount of trauma she suffered was minor but she has had a great deal of difficulty getting it to heal.  We reduced her antiplatelet therapy at her last visit.  She has a clear indication for antiplatelet therapy given her history of two-vessel CABG.  Ultimately we agreed that she should stay on aspirin but will go to every other day.    She reports that she is going to have surgery on her index finger.  This will be with IV sedation at the hand center.    Assessment & Plan    Impressions:  Coronary artery disease status post two-vessel CABG in May 2015      SRINATH-RCA, LIMA-LAD  Hypertension  Hypertensive cardiovascular disease  Hyperlipidemia  Carotid artery disease status post carotid endarterectomy    Recommendations:  Continuation of her current cardiovascular regimen at the present time.     This includes  "antiplatelet therapy, antihypertensives, short acting nitrates, and statin therapy.  Reduce aspirin dosing to every other day  Follow-up in 6 months time sooner should the be difficulties.    Diagnoses and all orders for this visit:    1. Arteriosclerosis of coronary artery (Primary)  -     ECG 12 Lead    2. Coronary artery disease with angina pectoris, unspecified vessel or lesion type, unspecified whether native or transplanted heart (HCC)  -     ECG 12 Lead    3. Essential hypertension  -     ECG 12 Lead    4. Mixed hyperlipidemia  -     ECG 12 Lead    5. Status post coronary artery bypass graft  -     ECG 12 Lead    6. Long term (current) use of antithrombotics/antiplatelets  -     ECG 12 Lead          Objective:    Vitals:  Vitals:    12/09/22 0903   BP: 133/76   Pulse: 59   SpO2: 97%   Weight: 68 kg (150 lb)   Height: 157.5 cm (62\")     Body mass index is 27.44 kg/m².      Physical Exam:    General: Alert, cooperative, no distress, appears stated age  Head:  Normocephalic, atraumatic, mucous membranes moist  Eyes:  Conjunctiva/corneas clear, EOM's intact     Neck:  Supple, bilateral carotid bruits  Lungs: Clear to auscultation bilaterally, no wheezes rhonchi rales are noted  Chest wall: No tenderness  Heart::  Regular rate and rhythm, S1 and S2 normal, 1/6 holosystolic murmur.  No, rub or gallop  Abdomen: Soft, non-tender, nondistended bowel sounds active  Extremities: No cyanosis, clubbing, or edema  Pulses: 2+ and symmetric all extremities  Skin:  Abrasion to left lower extremity with bandage  Neuro/psych: A&O x3. CN II through XII are grossly intact with appropriate affect      Allergies:  Allergies   Allergen Reactions   • Codeine Unknown - Low Severity     Pt Cannot remember   • Levaquin [Levofloxacin] GI Intolerance   • Penicillins Rash   • Sulfa Antibiotics Unknown (See Comments) and Rash       Medication Review:     Current Outpatient Medications:   •  aspirin 81 MG tablet, Take 81 mg by mouth Every " Night. HOLD DOS, Disp: , Rfl:   •  escitalopram (LEXAPRO) 5 MG tablet, Take 5 mg by mouth Every Night., Disp: , Rfl:   •  ezetimibe (ZETIA) 10 MG tablet, TAKE 1 TABLET BY MOUTH DAILY, Disp: 90 tablet, Rfl: 1  •  losartan (COZAAR) 50 MG tablet, Take 75 mg by mouth Daily. Take 1 1/2 tab daily, Disp: , Rfl:   •  metoprolol succinate XL (TOPROL-XL) 25 MG 24 hr tablet, TAKE 1 TABLET BY MOUTH DAILY, Disp: 90 tablet, Rfl: 2  •  nitrofurantoin (MACRODANTIN) 50 MG capsule, Take 50 mg by mouth Every Night. Daily for UTI prevention, Disp: , Rfl: 4  •  pantoprazole (Protonix) 40 MG EC tablet, Take 1 tablet by mouth Daily., Disp: 90 tablet, Rfl: 3  •  rosuvastatin (CRESTOR) 10 MG tablet, TAKE 1 TABLET BY MOUTH DAILY, Disp: 90 tablet, Rfl: 3  •  temazepam (RESTORIL) 15 MG capsule, Take 15 mg by mouth At Night As Needed., Disp: , Rfl:   •  vitamin D3 125 MCG (5000 UT) capsule capsule, Take 5,000 Units by mouth Daily., Disp: , Rfl:     Family History:  Family History   Problem Relation Age of Onset   • Diabetes Mother    • Malig Hyperthermia Neg Hx        Past Medical History:  Past Medical History:   Diagnosis Date   • Anxiety    • Arthritis    • Atrial fibrillation (HCC)    • Cancer (HCC)     Left breast Cancer and Skin cancer   • Coronary artery disease    • Frequent UTI    • GERD (gastroesophageal reflux disease)    • Hyperlipidemia    • Hypertension    • MRSA infection 07/06/2022    LEFT FINGER/ [facility] OFF[facility] [place]   • Non-healing skin lesion     LEFT INDEX FINGER   • Palpitations    • Sleep apnea        Past Surgical History:  Past Surgical History:   Procedure Laterality Date   • ANGIOPLASTY SUBCLAVIAN ARTERY Left 8/12/2022    Procedure: LEFT SUBCLAVIAN ARTERY STENT PLACEMENT;  Surgeon: Dilshad Domingo MD;  Location: [facility] 18/19;  Service: Vascular;  Laterality: Left;   • BREAST LUMPECTOMY Left    • CARDIAC CATHETERIZATION     • CAROTID ENDARTERECTOMY Left 4/14/2022    Procedure: LEFT CAROTID  ENDARTERECTOMY;  Surgeon: Dilshad Domingo MD;  Location: [facility];  Service: Vascular;  Laterality: Left;   • CHOLECYSTECTOMY     • CORONARY ARTERY BYPASS GRAFT     • HYSTERECTOMY         Social History:  Social History     Socioeconomic History   • Marital status:    Tobacco Use   • Smoking status: Former     Packs/day: 1.50     Years: 50.00     Pack years: 75.00     Types: Cigarettes     Quit date:      Years since quittin.9   • Smokeless tobacco: Never   Vaping Use   • Vaping Use: Never used   Substance and Sexual Activity   • Alcohol use: Yes     Alcohol/week: 7.0 standard drinks     Types: 7 Glasses of wine per week     Comment: routinely    • Drug use: No   • Sexual activity: Defer       Review of Systems:  The following systems were reviewed as they relate to the cardiovascular system: Constitutional, Eyes, ENT, Cardiovascular, Respiratory, Gastrointestinal, Integumentary, Neurological, Psychiatric, Hematologic, Endocrine, Musculoskeletal, and Genitourinary. The pertinent cardiovascular findings are reported above with all other cardiovascular points within those systems being negative.    Diagnostic Study Review:     Current Electrocardiogram:    ECG 12 Lead    Date/Time: 2022 5:29 PM  Performed by: Jagdeep Perez DO  Authorized by: Jagdeep Perez DO   Comparison: not compared with previous ECG   Previous ECG: no previous ECG available  Comments: Sinus bradycardia with a ventricular rate of 58 bpm.  Consider left atrial enlargement.  Incomplete right bundle branch block.  Normal QT and QTc intervals.  Normal QRS axis.            Laboratory Data:  Lab Results   Component Value Date    GLUCOSE 133 (H) 2022    BUN 21 2022    CREATININE 0.63 2022    BCR 33.3 (H) 2022    K 4.4 2022    CO2 22.2 2022    CALCIUM 9.1 2022    ALBUMIN 3.60 2022    LABIL2 1.3 2020    AST 12 2022    ALT 9 2022      Lab Results   Component Value Date    GLUCOSE 133 (H) 08/04/2022    CALCIUM 9.1 08/04/2022     08/04/2022    K 4.4 08/04/2022    CO2 22.2 08/04/2022     08/04/2022    BUN 21 08/04/2022    CREATININE 0.63 08/04/2022    BCR 33.3 (H) 08/04/2022    ANIONGAP 11.8 08/04/2022     Lab Results   Component Value Date    WBC 10.87 (H) 08/04/2022    HGB 12.9 08/04/2022    HCT 38.9 08/04/2022    MCV 87.4 08/04/2022     08/04/2022     Lab Results   Component Value Date    CHOL 118 04/15/2022    TRIG 96 04/15/2022    HDL 46 04/15/2022    LDL 54 04/15/2022     No results found for: HGBA1C  No results found for: INR, PROTIME    Most Recent Echo:       Most Recent Stress Test:  Results for orders placed during the hospital encounter of 07/20/21    Stress Test With Myocardial Perfusion One Day    Interpretation Summary  · Myocardial perfusion imaging indicates a normal myocardial perfusion study with no evidence of ischemia.  · Fixed apical defect likely secondary to technical factors given normal wall motion in this area  · Left ventricular ejection fraction is hyperdynamic (Calculated EF > 70%). .  · Findings consistent with a normal ECG stress test.  · Impressions are consistent with a low risk study.  · There is no prior study available for comparison.       Most Recent Cardiac Catheterization:   No results found for this or any previous visit.       NOTE: The following portions of the patient's note were reviewed, confirmed and/or updated this visit as appropriate: History of present illness/Interval history, physical examination, assessment & plan, allergies, current medications, past family history, past medical history, past social history, past surgical history and problem list.

## 2022-12-09 ENCOUNTER — OFFICE VISIT (OUTPATIENT)
Dept: CARDIOLOGY | Facility: CLINIC | Age: 82
End: 2022-12-09

## 2022-12-09 VITALS
DIASTOLIC BLOOD PRESSURE: 76 MMHG | BODY MASS INDEX: 27.6 KG/M2 | WEIGHT: 150 LBS | SYSTOLIC BLOOD PRESSURE: 133 MMHG | HEART RATE: 59 BPM | OXYGEN SATURATION: 97 % | HEIGHT: 62 IN

## 2022-12-09 DIAGNOSIS — I10 ESSENTIAL HYPERTENSION: ICD-10-CM

## 2022-12-09 DIAGNOSIS — E78.2 MIXED HYPERLIPIDEMIA: ICD-10-CM

## 2022-12-09 DIAGNOSIS — Z79.02 LONG TERM (CURRENT) USE OF ANTITHROMBOTICS/ANTIPLATELETS: ICD-10-CM

## 2022-12-09 DIAGNOSIS — I25.119 CORONARY ARTERY DISEASE WITH ANGINA PECTORIS, UNSPECIFIED VESSEL OR LESION TYPE, UNSPECIFIED WHETHER NATIVE OR TRANSPLANTED HEART: ICD-10-CM

## 2022-12-09 DIAGNOSIS — Z95.1 STATUS POST CORONARY ARTERY BYPASS GRAFT: ICD-10-CM

## 2022-12-09 DIAGNOSIS — I25.10 ARTERIOSCLEROSIS OF CORONARY ARTERY: Primary | ICD-10-CM

## 2022-12-09 PROCEDURE — 93000 ELECTROCARDIOGRAM COMPLETE: CPT | Performed by: INTERNAL MEDICINE

## 2022-12-09 PROCEDURE — 99214 OFFICE O/P EST MOD 30 MIN: CPT | Performed by: INTERNAL MEDICINE

## 2022-12-12 ENCOUNTER — APPOINTMENT (OUTPATIENT)
Dept: VASCULAR SURGERY | Facility: HOSPITAL | Age: 82
End: 2022-12-12

## 2022-12-12 ENCOUNTER — HOSPITAL ENCOUNTER (OUTPATIENT)
Dept: CARDIOLOGY | Facility: HOSPITAL | Age: 82
Discharge: HOME | End: 2022-12-12

## 2022-12-12 ENCOUNTER — TRANSCRIBE ORDERS (OUTPATIENT)
Dept: ADMINISTRATIVE | Facility: HOSPITAL | Age: 82
End: 2022-12-12

## 2022-12-12 DIAGNOSIS — I65.23 BILATERAL CAROTID ARTERY OCCLUSION: ICD-10-CM

## 2022-12-12 DIAGNOSIS — I65.23 BILATERAL CAROTID ARTERY OCCLUSION: Primary | ICD-10-CM

## 2022-12-12 DIAGNOSIS — R22.43 LOCALIZED SWELLING, MASS AND LUMP, LOWER LIMB, BILATERAL: Primary | ICD-10-CM

## 2022-12-12 LAB
BH CV XLRA MEAS LEFT DIST CCA EDV: -12.1 CM/SEC
BH CV XLRA MEAS LEFT DIST CCA PSV: -116 CM/SEC
BH CV XLRA MEAS LEFT DIST ICA EDV: -18.4 CM/SEC
BH CV XLRA MEAS LEFT DIST ICA PSV: -99.7 CM/SEC
BH CV XLRA MEAS LEFT ICA/CCA RATIO: 2.06
BH CV XLRA MEAS LEFT PROX CCA EDV: 19.8 CM/SEC
BH CV XLRA MEAS LEFT PROX CCA PSV: 114 CM/SEC
BH CV XLRA MEAS LEFT PROX ECA PSV: -82.3 CM/SEC
BH CV XLRA MEAS LEFT PROX ICA EDV: -39.9 CM/SEC
BH CV XLRA MEAS LEFT PROX ICA PSV: -239 CM/SEC
BH CV XLRA MEAS LEFT PROX SCLA PSV: 180 CM/SEC
BH CV XLRA MEAS LEFT VERTEBRAL A EDV: -9.5 CM/SEC
BH CV XLRA MEAS LEFT VERTEBRAL A PSV: -65.9 CM/SEC
BH CV XLRA MEAS RIGHT DIST CCA EDV: 11.2 CM/SEC
BH CV XLRA MEAS RIGHT DIST CCA PSV: 105 CM/SEC
BH CV XLRA MEAS RIGHT DIST ICA EDV: -19.1 CM/SEC
BH CV XLRA MEAS RIGHT DIST ICA PSV: -107 CM/SEC
BH CV XLRA MEAS RIGHT ICA/CCA RATIO: 1.66
BH CV XLRA MEAS RIGHT PROX CCA EDV: 10.6 CM/SEC
BH CV XLRA MEAS RIGHT PROX CCA PSV: 90.7 CM/SEC
BH CV XLRA MEAS RIGHT PROX ECA PSV: -98.8 CM/SEC
BH CV XLRA MEAS RIGHT PROX ICA EDV: 30 CM/SEC
BH CV XLRA MEAS RIGHT PROX ICA PSV: 174 CM/SEC
BH CV XLRA MEAS RIGHT PROX SCLA PSV: 263 CM/SEC
BH CV XLRA MEAS RIGHT VERTEBRAL A EDV: 10.6 CM/SEC
BH CV XLRA MEAS RIGHT VERTEBRAL A PSV: 54 CM/SEC
MAXIMAL PREDICTED HEART RATE: 139 BPM
RIGHT ARM BP: NORMAL MMHG
STRESS TARGET HR: 118 BPM

## 2022-12-12 PROCEDURE — 93880 EXTRACRANIAL BILAT STUDY: CPT

## 2022-12-12 PROCEDURE — G0463 HOSPITAL OUTPT CLINIC VISIT: HCPCS

## 2022-12-15 ENCOUNTER — TRANSCRIBE ORDERS (OUTPATIENT)
Dept: ADMINISTRATIVE | Facility: HOSPITAL | Age: 82
End: 2022-12-15

## 2022-12-15 DIAGNOSIS — R22.43 LOCALIZED SWELLING, MASS AND LUMP, LOWER LIMB, BILATERAL: Primary | ICD-10-CM

## 2022-12-28 ENCOUNTER — HOSPITAL ENCOUNTER (OUTPATIENT)
Dept: CARDIOLOGY | Facility: HOSPITAL | Age: 82
Discharge: HOME | End: 2022-12-28

## 2022-12-28 ENCOUNTER — APPOINTMENT (OUTPATIENT)
Dept: VASCULAR SURGERY | Facility: HOSPITAL | Age: 82
End: 2022-12-28

## 2022-12-28 ENCOUNTER — APPOINTMENT (OUTPATIENT)
Dept: CARDIOLOGY | Facility: HOSPITAL | Age: 82
End: 2022-12-28

## 2022-12-28 DIAGNOSIS — R22.43 LOCALIZED SWELLING, MASS AND LUMP, LOWER LIMB, BILATERAL: ICD-10-CM

## 2022-12-28 LAB
BH CV LEFT LOWER VAS COMMON FEMORAL REFLUX TIME: 1.9 SEC
BH CV LEFT LOWER VAS GSV BELOW KNEE REFLUX TIME: 8.2 SEC
BH CV LEFT LOWER VAS GSV KNEE REFLUX TIME: 6 SEC
BH CV LEFT LOWER VAS GSV KNEE TRANSVERSE DIAMETER: 0.32 CM
BH CV LEFT LOWER VAS GSV MID TRANSVERSE DIAMETER: 0.34 CM
BH CV LEFT LOWER VAS GSV PROX TRANSVERSE DIAMETER: 0.38 CM
BH CV LEFT LOWER VAS GSVBELOW KNEE TRANSVERSE DIAMETER: 0.3 CM
BH CV LEFT LOWER VAS POPLITEAL REFLUX TIME: 4.6 SEC
BH CV LEFT LOWER VAS SAPHENOFEMORAL JUNCTION REFLUX TIME: 2.7 SEC
BH CV LEFT LOWER VAS SAPHENOFEMORAL JUNCTION TRANSVERSE DIAMETER: 0.54 CM
BH CV LEFT LOWER VAS SSV PROX CALF TRANS DIAMETER: 0.17 CM
BH CV LOW VAS LEFT COMMON FEM NOT VIS SCRIPTING: 1
BH CV LOW VAS LEFT GREATER SAPH BK VESSEL: 1
BH CV LOW VAS LEFT GSV DIST THIGH VESSEL: 1
BH CV LOW VAS LEFT POPLITEAL NOT VIS SCRIPTING: 1
BH CV LOW VAS LEFT SAPHENOFEM VESSEL: 1
BH CV LOWER VAS LEFT GSV DIST THIGH COMPRESSIBILTY: NORMAL
BH CV LOWER VAS LEFT GSV MID THIGH COMPRESSIBILTY: NORMAL
BH CV LOWER VASCULAR LEFT COMMON FEMORAL AUGMENT: NORMAL
BH CV LOWER VASCULAR LEFT COMMON FEMORAL COMPETENT: NORMAL
BH CV LOWER VASCULAR LEFT COMMON FEMORAL COMPRESS: NORMAL
BH CV LOWER VASCULAR LEFT COMMON FEMORAL PHASIC: NORMAL
BH CV LOWER VASCULAR LEFT COMMON FEMORAL SPONT: NORMAL
BH CV LOWER VASCULAR LEFT DISTAL FEMORAL COMPRESS: NORMAL
BH CV LOWER VASCULAR LEFT GASTRONEMIUS COMPRESS: NORMAL
BH CV LOWER VASCULAR LEFT GREATER SAPH AK COMPETENT: NORMAL
BH CV LOWER VASCULAR LEFT GREATER SAPH AK COMPRESS: NORMAL
BH CV LOWER VASCULAR LEFT GREATER SAPH BK COMPETENT: NORMAL
BH CV LOWER VASCULAR LEFT GREATER SAPH BK COMPRESS: NORMAL
BH CV LOWER VASCULAR LEFT GSV DIST THIGH COMPETENT: NORMAL
BH CV LOWER VASCULAR LEFT GSV MID THIGH COMPETENT: NORMAL
BH CV LOWER VASCULAR LEFT LESSER SAPH COMPETENT: NORMAL
BH CV LOWER VASCULAR LEFT LESSER SAPH COMPRESS: NORMAL
BH CV LOWER VASCULAR LEFT MID FEMORAL AUGMENT: NORMAL
BH CV LOWER VASCULAR LEFT MID FEMORAL COMPETENT: NORMAL
BH CV LOWER VASCULAR LEFT MID FEMORAL COMPRESS: NORMAL
BH CV LOWER VASCULAR LEFT MID FEMORAL PHASIC: NORMAL
BH CV LOWER VASCULAR LEFT MID FEMORAL SPONT: NORMAL
BH CV LOWER VASCULAR LEFT PERONEAL COMPRESS: NORMAL
BH CV LOWER VASCULAR LEFT POPLITEAL AUGMENT: NORMAL
BH CV LOWER VASCULAR LEFT POPLITEAL COMPETENT: NORMAL
BH CV LOWER VASCULAR LEFT POPLITEAL COMPRESS: NORMAL
BH CV LOWER VASCULAR LEFT POPLITEAL PHASIC: NORMAL
BH CV LOWER VASCULAR LEFT POPLITEAL SPONT: NORMAL
BH CV LOWER VASCULAR LEFT POSTERIOR TIBIAL COMPRESS: NORMAL
BH CV LOWER VASCULAR LEFT PROXIMAL FEMORAL COMPRESS: NORMAL
BH CV LOWER VASCULAR LEFT SAPHENOFEMORAL JUNCTION AUGMENT: NORMAL
BH CV LOWER VASCULAR LEFT SAPHENOFEMORAL JUNCTION COMPETENT: NORMAL
BH CV LOWER VASCULAR LEFT SAPHENOFEMORAL JUNCTION COMPRESS: NORMAL
BH CV LOWER VASCULAR LEFT SAPHENOFEMORAL JUNCTION PHASIC: NORMAL
BH CV LOWER VASCULAR LEFT SAPHENOFEMORAL JUNCTION SPONT: NORMAL

## 2022-12-28 PROCEDURE — G0463 HOSPITAL OUTPT CLINIC VISIT: HCPCS

## 2022-12-28 PROCEDURE — 93971 EXTREMITY STUDY: CPT

## 2022-12-29 ENCOUNTER — TELEPHONE (OUTPATIENT)
Dept: CARDIOLOGY | Facility: CLINIC | Age: 82
End: 2022-12-29

## 2022-12-29 DIAGNOSIS — I10 ESSENTIAL HYPERTENSION: Primary | ICD-10-CM

## 2022-12-29 DIAGNOSIS — E78.2 MIXED HYPERLIPIDEMIA: ICD-10-CM

## 2022-12-29 DIAGNOSIS — R60.0 BILATERAL LEG EDEMA: ICD-10-CM

## 2022-12-29 NOTE — TELEPHONE ENCOUNTER
Patient calling states she is swollen all over, had a vascular scan yesterday and Dr Domingo's NP asked if she had ever had heart failure then recommended she see another doctor. She says she feels good other than the swelling and her blood pressure has been running a little higher and does not want to see another doctor until she talks to Dr Perez. I let her know Dr Perez is out until Tuesday and recommend she see her PCP in the meantime. I will call for the office note from yesterday and show it to Dr Perez when he returns. She verbalized agreement to this and will let me know if she does end up getting in with her PCP and what they suggest.

## 2022-12-30 NOTE — TELEPHONE ENCOUNTER
Per Moon Bee- an order was placed to get an echo done, add lasix for 7 days only and have labs drawn in 1 week. Watch salt intake closely and cut back on fluid intake. Patient informed, states she will go to [facility] to have labs drawn next week- orders faxed to the lab at [place].

## 2023-01-05 ENCOUNTER — HOSPITAL ENCOUNTER (OUTPATIENT)
Dept: CARDIOLOGY | Facility: HOSPITAL | Age: 83
Discharge: HOME | End: 2023-01-05
Admitting: NURSE PRACTITIONER
Payer: MEDICARE

## 2023-01-05 VITALS
WEIGHT: 144 LBS | SYSTOLIC BLOOD PRESSURE: 173 MMHG | HEART RATE: 60 BPM | HEIGHT: 63 IN | DIASTOLIC BLOOD PRESSURE: 58 MMHG | BODY MASS INDEX: 25.52 KG/M2

## 2023-01-05 DIAGNOSIS — I10 ESSENTIAL HYPERTENSION: ICD-10-CM

## 2023-01-05 DIAGNOSIS — E78.2 MIXED HYPERLIPIDEMIA: ICD-10-CM

## 2023-01-05 DIAGNOSIS — R60.0 BILATERAL LEG EDEMA: ICD-10-CM

## 2023-01-05 PROCEDURE — 93306 TTE W/DOPPLER COMPLETE: CPT

## 2023-01-05 PROCEDURE — 93306 TTE W/DOPPLER COMPLETE: CPT | Performed by: INTERNAL MEDICINE

## 2023-01-06 ENCOUNTER — TELEPHONE (OUTPATIENT)
Dept: CARDIOLOGY | Facility: CLINIC | Age: 83
End: 2023-01-06

## 2023-01-06 NOTE — TELEPHONE ENCOUNTER
Caller: Elizabeth Whaley    Relationship: Self    Best call back number: [phone]    What test was performed: BLOOD TEST, ECHO     When was the test performed: BLOOD TEST 12.30.22                                                     ECHO 1.5.23    Where was the test performed: [facility]

## 2023-01-06 NOTE — TELEPHONE ENCOUNTER
Called to let patient know we do not have the labs from [facility] but have called for them. Dr Perez will get echos read this weekend

## 2023-01-09 LAB
BH CV ECHO MEAS - ACS: 1.97 CM
BH CV ECHO MEAS - AO MAX PG: 7.1 MMHG
BH CV ECHO MEAS - AO MEAN PG: 4 MMHG
BH CV ECHO MEAS - AO ROOT DIAM: 3.4 CM
BH CV ECHO MEAS - AO V2 MAX: 133.6 CM/SEC
BH CV ECHO MEAS - AO V2 VTI: 34.4 CM
BH CV ECHO MEAS - AVA(I,D): 2.5 CM2
BH CV ECHO MEAS - EDV(CUBED): 102.2 ML
BH CV ECHO MEAS - EDV(MOD-SP4): 43.6 ML
BH CV ECHO MEAS - EF(MOD-SP4): 72.2 %
BH CV ECHO MEAS - ESV(CUBED): 26.4 ML
BH CV ECHO MEAS - ESV(MOD-SP4): 12.1 ML
BH CV ECHO MEAS - FS: 36.3 %
BH CV ECHO MEAS - IVS/LVPW: 1 CM
BH CV ECHO MEAS - IVSD: 1.3 CM
BH CV ECHO MEAS - LA DIMENSION: 4.6 CM
BH CV ECHO MEAS - LV DIASTOLIC VOL/BSA (35-75): 26.2 CM2
BH CV ECHO MEAS - LV MASS(C)D: 234.5 GRAMS
BH CV ECHO MEAS - LV MAX PG: 3.3 MMHG
BH CV ECHO MEAS - LV MEAN PG: 1.78 MMHG
BH CV ECHO MEAS - LV SYSTOLIC VOL/BSA (12-30): 7.3 CM2
BH CV ECHO MEAS - LV V1 MAX: 90.7 CM/SEC
BH CV ECHO MEAS - LV V1 VTI: 25.5 CM
BH CV ECHO MEAS - LVIDD: 4.7 CM
BH CV ECHO MEAS - LVIDS: 3 CM
BH CV ECHO MEAS - LVOT AREA: 3.4 CM2
BH CV ECHO MEAS - LVOT DIAM: 2.09 CM
BH CV ECHO MEAS - LVPWD: 1.29 CM
BH CV ECHO MEAS - MV A MAX VEL: 103.6 CM/SEC
BH CV ECHO MEAS - MV DEC SLOPE: 481.4 CM/SEC2
BH CV ECHO MEAS - MV DEC TIME: 0.22 MSEC
BH CV ECHO MEAS - MV E MAX VEL: 108.2 CM/SEC
BH CV ECHO MEAS - MV E/A: 1.04
BH CV ECHO MEAS - MV MAX PG: 5.4 MMHG
BH CV ECHO MEAS - MV MEAN PG: 1.79 MMHG
BH CV ECHO MEAS - MV V2 VTI: 41.9 CM
BH CV ECHO MEAS - MVA(VTI): 2.08 CM2
BH CV ECHO MEAS - PA V2 MAX: 76.5 CM/SEC
BH CV ECHO MEAS - PI END-D VEL: 62 CM/SEC
BH CV ECHO MEAS - PULM A REVS DUR: 0.09 SEC
BH CV ECHO MEAS - PULM A REVS VEL: 37.4 CM/SEC
BH CV ECHO MEAS - PULM DIAS VEL: 53.4 CM/SEC
BH CV ECHO MEAS - PULM S/D: 1.23
BH CV ECHO MEAS - PULM SYS VEL: 65.7 CM/SEC
BH CV ECHO MEAS - RAP SYSTOLE: 8 MMHG
BH CV ECHO MEAS - RV MAX PG: 0.94 MMHG
BH CV ECHO MEAS - RV V1 MAX: 48.4 CM/SEC
BH CV ECHO MEAS - RV V1 VTI: 12.5 CM
BH CV ECHO MEAS - RVDD: 2.33 CM
BH CV ECHO MEAS - RVSP: 25.9 MMHG
BH CV ECHO MEAS - SI(MOD-SP4): 18.9 ML/M2
BH CV ECHO MEAS - SV(LVOT): 87.1 ML
BH CV ECHO MEAS - SV(MOD-SP4): 31.5 ML
BH CV ECHO MEAS - TR MAX PG: 17.9 MMHG
BH CV ECHO MEAS - TR MAX VEL: 211.4 CM/SEC
MAXIMAL PREDICTED HEART RATE: 139 BPM
STRESS TARGET HR: 118 BPM

## 2023-01-09 NOTE — TELEPHONE ENCOUNTER
Per Moon- Echo is normal- patient should follow up with PCP or Vascular. My Chart message sent.       OK for [facility] to release

## 2023-01-13 ENCOUNTER — TELEPHONE (OUTPATIENT)
Dept: CARDIOLOGY | Facility: CLINIC | Age: 83
End: 2023-01-13
Payer: MEDICARE

## 2023-01-13 NOTE — TELEPHONE ENCOUNTER
Attempted to call pt, unable to reach, v/m not set up.      ----- Message from COCO Case sent at 1/9/2023  3:54 PM EST -----  Labs are good.  ----- Message -----  From: Mariella William Incoming  Sent: 1/9/2023   1:41 PM EST  To: COCO Case

## 2023-02-27 RX ORDER — PANTOPRAZOLE SODIUM 40 MG/1
40 TABLET, DELAYED RELEASE ORAL DAILY
Qty: 90 TABLET | Refills: 2 | Status: SHIPPED | OUTPATIENT
Start: 2023-02-27

## 2023-02-27 RX ORDER — EZETIMIBE 10 MG/1
10 TABLET ORAL DAILY
Qty: 90 TABLET | Refills: 1 | Status: SHIPPED | OUTPATIENT
Start: 2023-02-27

## 2023-02-27 RX ORDER — ROSUVASTATIN CALCIUM 10 MG/1
10 TABLET, COATED ORAL DAILY
Qty: 90 TABLET | Refills: 1 | Status: SHIPPED | OUTPATIENT
Start: 2023-02-27

## 2023-04-10 ENCOUNTER — APPOINTMENT (OUTPATIENT)
Dept: VASCULAR SURGERY | Facility: HOSPITAL | Age: 83
End: 2023-04-10
Payer: MEDICARE

## 2023-04-10 ENCOUNTER — TRANSCRIBE ORDERS (OUTPATIENT)
Dept: ADMINISTRATIVE | Facility: HOSPITAL | Age: 83
End: 2023-04-10
Payer: MEDICARE

## 2023-04-10 DIAGNOSIS — R09.89 BRUIT: Primary | ICD-10-CM

## 2023-04-10 DIAGNOSIS — R09.89 WEAK PULSE: ICD-10-CM

## 2023-04-10 PROCEDURE — G0463 HOSPITAL OUTPT CLINIC VISIT: HCPCS

## 2023-05-12 RX ORDER — METOPROLOL SUCCINATE 25 MG/1
25 TABLET, EXTENDED RELEASE ORAL DAILY
Qty: 90 TABLET | Refills: 2 | Status: SHIPPED | OUTPATIENT
Start: 2023-05-12

## 2023-06-09 ENCOUNTER — OFFICE VISIT (OUTPATIENT)
Dept: CARDIOLOGY | Facility: CLINIC | Age: 83
End: 2023-06-09
Payer: MEDICARE

## 2023-06-09 VITALS
SYSTOLIC BLOOD PRESSURE: 175 MMHG | HEIGHT: 62 IN | WEIGHT: 150 LBS | RESPIRATION RATE: 18 BRPM | BODY MASS INDEX: 27.6 KG/M2 | DIASTOLIC BLOOD PRESSURE: 59 MMHG | HEART RATE: 55 BPM | OXYGEN SATURATION: 98 %

## 2023-06-09 DIAGNOSIS — I10 ESSENTIAL HYPERTENSION: ICD-10-CM

## 2023-06-09 DIAGNOSIS — I25.10 ARTERIOSCLEROSIS OF CORONARY ARTERY: Primary | ICD-10-CM

## 2023-06-09 DIAGNOSIS — I25.119 CORONARY ARTERY DISEASE WITH ANGINA PECTORIS, UNSPECIFIED VESSEL OR LESION TYPE, UNSPECIFIED WHETHER NATIVE OR TRANSPLANTED HEART: ICD-10-CM

## 2023-06-09 DIAGNOSIS — R55 NEAR SYNCOPE: ICD-10-CM

## 2023-06-09 PROCEDURE — 99214 OFFICE O/P EST MOD 30 MIN: CPT | Performed by: INTERNAL MEDICINE

## 2023-06-09 PROCEDURE — 3078F DIAST BP <80 MM HG: CPT | Performed by: INTERNAL MEDICINE

## 2023-06-09 PROCEDURE — 3077F SYST BP >= 140 MM HG: CPT | Performed by: INTERNAL MEDICINE

## 2023-06-09 PROCEDURE — 1159F MED LIST DOCD IN RCRD: CPT | Performed by: INTERNAL MEDICINE

## 2023-06-09 PROCEDURE — 93000 ELECTROCARDIOGRAM COMPLETE: CPT | Performed by: INTERNAL MEDICINE

## 2023-06-09 PROCEDURE — 1160F RVW MEDS BY RX/DR IN RCRD: CPT | Performed by: INTERNAL MEDICINE

## 2023-06-09 RX ORDER — HYDROCHLOROTHIAZIDE 12.5 MG/1
TABLET ORAL
COMMUNITY
Start: 2023-04-19

## 2023-06-09 NOTE — PROGRESS NOTES
Cardiology Office Visit      Encounter Date:  06/09/2023    Patient ID:   Elizabeth Whaley is a 82 y.o. female.    Reason For Followup:  Coronary artery disease  Hypertension  Hypertensive cardiovascular disease  Hyperlipidemia  Antiplatelet therapy    Brief Clinical History:  Dear Dr. Wadsworth, Gerda Diamond MD    I had the pleasure of seeing Elizabeth Whaley today. As you are well aware, this is a 82 y.o. female with a history of coronary artery disease she is undergone coronary arterial bypass grafting in 2015.  She has additional history that includes hypertension, hypertensive cardiovascular disease, hyperlipidemia, and antiplatelet therapy.  She presents today for follow-up on the above conditions.    Interval History:  She denies any chest pain pressure heaviness or tightness.  She denies any shortness of breath.  She denies any PND orthopnea.  She denies any syncope or near syncope.    She does report that she is losing her balance quite a bit.  She reports that she is fallen a couple of times.  She continues to have issues with excessive bruising despite being on aspirin every other day.  She spoke with vascular surgery about going down to twice a week but they adamantly discouraged it.    Because of her unsteadiness, we will get an ambulatory ECG.    Assessment & Plan    Impressions:  Coronary artery disease status post two-vessel CABG in May 2015      SRINATH-RCA, LIMA-LAD  Hypertension  Hypertensive cardiovascular disease  Hyperlipidemia  Carotid artery disease status post carotid endarterectomy  Unsteady gait    Recommendations:  Continuation of her current cardiovascular regimen at the present time.     This includes antiplatelet therapy, antihypertensives, short acting nitrates, and statin therapy.  4-week mobile cardiac telemetry  Follow-up in 3 months    Diagnoses and all orders for this visit:    1. Arteriosclerosis of coronary artery (Primary)  -     Mobile Cardiac Outpatient Telemetry; Future  -     ECG 12  "Lead    2. Coronary artery disease with angina pectoris, unspecified vessel or lesion type, unspecified whether native or transplanted heart  -     Mobile Cardiac Outpatient Telemetry; Future  -     ECG 12 Lead    3. Essential hypertension  -     Mobile Cardiac Outpatient Telemetry; Future  -     ECG 12 Lead    4. Near syncope  -     Mobile Cardiac Outpatient Telemetry; Future  -     ECG 12 Lead          Objective:    Vitals:  Vitals:    06/09/23 0917   BP: 175/59   BP Location: Left arm   Patient Position: Sitting   Cuff Size: Large Adult   Pulse: 55   Resp: 18   SpO2: 98%   Weight: 68 kg (150 lb)   Height: 157.5 cm (62\")     Body mass index is 27.44 kg/m².      Physical Exam:    General: Alert, cooperative, no distress, appears stated age  Head:  Normocephalic, atraumatic, mucous membranes moist  Eyes:  Conjunctiva/corneas clear, EOM's intact     Neck:  Supple, bilateral carotid bruits  Lungs: Clear to auscultation bilaterally, no wheezes rhonchi rales are noted  Chest wall: No tenderness  Heart::  Regular rate and rhythm, S1 and S2 normal, 1/6 holosystolic murmur.  No, rub or gallop  Abdomen: Soft, non-tender, nondistended bowel sounds active  Extremities: No cyanosis, clubbing, or edema  Pulses: 2+ and symmetric all extremities  Skin:  Bruising and abrasions.  Neuro/psych: A&O x3. CN II through XII are grossly intact with appropriate affect      Allergies:  Allergies   Allergen Reactions    Codeine Unknown - Low Severity     Pt Cannot remember    Levaquin [Levofloxacin] GI Intolerance    Penicillins Rash    Sulfa Antibiotics Unknown (See Comments) and Rash       Medication Review:     Current Outpatient Medications:     aspirin 81 MG tablet, Take 1 tablet by mouth Every Night. HOLD DOS, Disp: , Rfl:     escitalopram (LEXAPRO) 5 MG tablet, Take 1 tablet by mouth Every Night., Disp: , Rfl:     ezetimibe (ZETIA) 10 MG tablet, TAKE 1 TABLET BY MOUTH DAILY, Disp: 90 tablet, Rfl: 1    hydroCHLOROthiazide (HYDRODIURIL) " 12.5 MG tablet, TAKE 1 TABLET BY MOUTH DAILY FOR FLUID RETENTION, Disp: , Rfl:     losartan (COZAAR) 50 MG tablet, Take 1.5 tablets by mouth Daily. Take 1 1/2 tab daily, Disp: , Rfl:     metoprolol succinate XL (TOPROL-XL) 25 MG 24 hr tablet, TAKE 1 TABLET BY MOUTH DAILY, Disp: 90 tablet, Rfl: 2    pantoprazole (PROTONIX) 40 MG EC tablet, TAKE 1 TABLET BY MOUTH DAILY, Disp: 90 tablet, Rfl: 2    rosuvastatin (CRESTOR) 10 MG tablet, TAKE 1 TABLET BY MOUTH DAILY, Disp: 90 tablet, Rfl: 1    temazepam (RESTORIL) 15 MG capsule, Take 1 capsule by mouth At Night As Needed., Disp: , Rfl:     vitamin D3 125 MCG (5000 UT) capsule capsule, Take 1 capsule by mouth Daily., Disp: , Rfl:     VITAMIN K PO, Take  by mouth., Disp: , Rfl:     Family History:  Family History   Problem Relation Age of Onset    Diabetes Mother     Malig Hyperthermia Neg Hx        Past Medical History:  Past Medical History:   Diagnosis Date    Anxiety     Arthritis     Atrial fibrillation     Cancer     Left breast Cancer and Skin cancer    Coronary artery disease     Frequent UTI     GERD (gastroesophageal reflux disease)     Hyperlipidemia     Hypertension     MRSA infection 07/06/2022    LEFT FINGER/ [facility] [place]    Non-healing skin lesion     LEFT INDEX FINGER    Palpitations     Sleep apnea        Past Surgical History:  Past Surgical History:   Procedure Laterality Date    ANGIOPLASTY SUBCLAVIAN ARTERY Left 8/12/2022    Procedure: LEFT SUBCLAVIAN ARTERY STENT PLACEMENT;  Surgeon: Dilshad Domingo MD;  Location: [facility] OR 18/19;  Service: Vascular;  Laterality: Left;    BREAST LUMPECTOMY Left     CARDIAC CATHETERIZATION      CAROTID ENDARTERECTOMY Left 4/14/2022    Procedure: LEFT CAROTID ENDARTERECTOMY;  Surgeon: Dilshad Domingo MD;  Location: [facility] MAIN OR;  Service: Vascular;  Laterality: Left;    CHOLECYSTECTOMY      CORONARY ARTERY BYPASS GRAFT      HYSTERECTOMY         Social History:  Social History      Socioeconomic History    Marital status:    Tobacco Use    Smoking status: Former     Packs/day: 1.50     Years: 50.00     Pack years: 75.00     Types: Cigarettes     Quit date:      Years since quittin.4    Smokeless tobacco: Never   Vaping Use    Vaping Use: Never used   Substance and Sexual Activity    Alcohol use: Yes     Alcohol/week: 7.0 standard drinks     Types: 7 Glasses of wine per week     Comment: routinely     Drug use: No    Sexual activity: Defer       Review of Systems:  The following systems were reviewed as they relate to the cardiovascular system: Constitutional, Eyes, ENT, Cardiovascular, Respiratory, Gastrointestinal, Integumentary, Neurological, Psychiatric, Hematologic, Endocrine, Musculoskeletal, and Genitourinary. The pertinent cardiovascular findings are reported above with all other cardiovascular points within those systems being negative.    Diagnostic Study Review:     Current Electrocardiogram:    ECG 12 Lead    Date/Time: 2023 6:10 PM  Performed by: Jagdeep Perez DO  Authorized by: Jagdeep Perez DO   Comparison: not compared with previous ECG   Previous ECG: no previous ECG available  Comments: Normal sinus rhythm with a ventricular rate of 55 bpm.  Incomplete right bundle branch block.  Normal QT and QTc was read normal QRS axis.        Laboratory Data:  Lab Results   Component Value Date    GLUCOSE 133 (H) 2022    BUN 21 2022    CREATININE 0.63 2022    BCR 33.3 (H) 2022    K 4.4 2022    CO2 22.2 2022    CALCIUM 9.1 2022    ALBUMIN 3.60 2022    LABIL2 1.3 2020    AST 12 2022    ALT 9 2022     Lab Results   Component Value Date    GLUCOSE 133 (H) 2022    CALCIUM 9.1 2022     2022    K 4.4 2022    CO2 22.2 2022     2022    BUN 21 2022    CREATININE 0.63 2022    BCR 33.3 (H) 2022    ANIONGAP 11.8  08/04/2022     Lab Results   Component Value Date    WBC 10.87 (H) 08/04/2022    HGB 12.9 08/04/2022    HCT 38.9 08/04/2022    MCV 87.4 08/04/2022     08/04/2022     Lab Results   Component Value Date    CHOL 118 04/15/2022    TRIG 96 04/15/2022    HDL 46 04/15/2022    LDL 54 04/15/2022     No results found for: HGBA1C  No results found for: INR, PROTIME    Most Recent Echo:  Results for orders placed during the hospital encounter of 01/05/23    Adult Transthoracic Echo Complete w/ Color, Spectral and Contrast if necessary per protocol    Interpretation Summary    Left ventricular systolic function is normal. Left ventricular ejection fraction appears to be 56 - 60%.    Left ventricular wall thickness is consistent with mild concentric hypertrophy.    There is mild, posterior mitral leaflet thickening present.    Estimated right ventricular systolic pressure from tricuspid regurgitation is normal (<35 mmHg).       Most Recent Stress Test:  Results for orders placed during the hospital encounter of 07/20/21    Stress Test With Myocardial Perfusion One Day    Interpretation Summary  · Myocardial perfusion imaging indicates a normal myocardial perfusion study with no evidence of ischemia.  · Fixed apical defect likely secondary to technical factors given normal wall motion in this area  · Left ventricular ejection fraction is hyperdynamic (Calculated EF > 70%). .  · Findings consistent with a normal ECG stress test.  · Impressions are consistent with a low risk study.  · There is no prior study available for comparison.       Most Recent Cardiac Catheterization:   No results found for this or any previous visit.       NOTE: The following portions of the patient's note were reviewed, confirmed and/or updated this visit as appropriate: History of present illness/Interval history, physical examination, assessment & plan, allergies, current medications, past family history, past medical history, past social history,  past surgical history and problem list.

## 2023-06-14 ENCOUNTER — APPOINTMENT (OUTPATIENT)
Dept: CARDIOLOGY | Facility: HOSPITAL | Age: 83
End: 2023-06-14

## 2023-08-29 RX ORDER — EZETIMIBE 10 MG/1
10 TABLET ORAL DAILY
Qty: 90 TABLET | Refills: 1 | Status: SHIPPED | OUTPATIENT
Start: 2023-08-29

## 2023-09-11 RX ORDER — ROSUVASTATIN CALCIUM 10 MG/1
10 TABLET, COATED ORAL DAILY
Qty: 90 TABLET | Refills: 1 | Status: SHIPPED | OUTPATIENT
Start: 2023-09-11

## 2023-10-27 ENCOUNTER — TRANSCRIBE ORDERS (OUTPATIENT)
Dept: ADMINISTRATIVE | Facility: HOSPITAL | Age: 83
End: 2023-10-27
Payer: MEDICARE

## 2023-10-27 DIAGNOSIS — R09.89 BRUIT: Primary | ICD-10-CM

## 2023-10-27 DIAGNOSIS — R09.89 WEAK PULSE: ICD-10-CM

## 2023-11-20 RX ORDER — PANTOPRAZOLE SODIUM 40 MG/1
40 TABLET, DELAYED RELEASE ORAL DAILY
Qty: 90 TABLET | Refills: 0 | Status: SHIPPED | OUTPATIENT
Start: 2023-11-20

## 2023-12-20 ENCOUNTER — TELEPHONE (OUTPATIENT)
Dept: CARDIOLOGY | Facility: CLINIC | Age: 83
End: 2023-12-20

## 2023-12-20 NOTE — TELEPHONE ENCOUNTER
Caller: ERLINDA    Relationship to patient: SELF    Best call back number: [phone]    Chief complaint: N/A    Type of visit: FOLLOW UP        If rescheduling, when is the original appointment: 12/21/23    Additional notes: PT'S APPT WAS CANCELLED FOR 12/21/23 AND SHE WOULD LIKE TO RESCHEDULE WITH DR. ONEAL

## 2024-01-03 ENCOUNTER — OFFICE VISIT (OUTPATIENT)
Dept: CARDIOLOGY | Facility: CLINIC | Age: 84
End: 2024-01-03
Payer: MEDICARE

## 2024-01-03 VITALS
SYSTOLIC BLOOD PRESSURE: 124 MMHG | WEIGHT: 153 LBS | HEIGHT: 62 IN | OXYGEN SATURATION: 99 % | HEART RATE: 63 BPM | DIASTOLIC BLOOD PRESSURE: 62 MMHG | BODY MASS INDEX: 28.16 KG/M2

## 2024-01-03 DIAGNOSIS — I25.119 CORONARY ARTERY DISEASE WITH ANGINA PECTORIS, UNSPECIFIED VESSEL OR LESION TYPE, UNSPECIFIED WHETHER NATIVE OR TRANSPLANTED HEART: ICD-10-CM
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
## 2024-01-03 DIAGNOSIS — I10 ESSENTIAL HYPERTENSION: ICD-10-CM

## 2024-01-03 DIAGNOSIS — E78.2 MIXED HYPERLIPIDEMIA: ICD-10-CM

## 2024-01-03 DIAGNOSIS — I25.10 ARTERIOSCLEROSIS OF CORONARY ARTERY: Primary | ICD-10-CM

## 2024-01-03 DIAGNOSIS — Z95.1 STATUS POST CORONARY ARTERY BYPASS GRAFT: ICD-10-CM

## 2024-01-03 RX ORDER — BRINZOLAMIDE 10 MG/ML
1 SUSPENSION/ DROPS OPHTHALMIC 3 TIMES DAILY
COMMUNITY

## 2024-01-03 RX ORDER — L.ACID,CASEI/B.ANIMAL/S.THERMO 16B CELL
1 CAPSULE ORAL DAILY
COMMUNITY

## 2024-01-03 NOTE — PROGRESS NOTES
Cardiology Office Visit      Encounter Date:  01/03/2024    Patient ID:   Elizabeth Whaley is a 83 y.o. female.    Reason For Followup:  Coronary artery disease  Hypertension  Hypertensive cardiovascular disease  Hyperlipidemia  Antiplatelet therapy    Brief Clinical History:  Dear Dr. Wadsworth, Gerda Diamond MD    I had the pleasure of seeing Elizabeth Whaley today. As you are well aware, this is a 83 y.o. female with a history of coronary artery disease she is undergone coronary arterial bypass grafting in 2015.  She has additional history that includes hypertension, hypertensive cardiovascular disease, hyperlipidemia, and antiplatelet therapy.  She presents today for follow-up on the above conditions.    Interval History:  She denies any chest pain pressure heaviness or tightness.  She denies any shortness of breath.  She denies any PND orthopnea.  She denies any syncope or near syncope.    She has reported several falls.  She notes that she is not dizzy or lightheaded with this.  She does not have any chest pain or discomfort.  It fact she notes that this occurs exclusively when she makes a quick turn to the right while standing.  Does not occur while she is sitting.  She has suffered a fall requiring surgery for a fractured elbow.    She continues to work at [employer].  She is considering retiring because of her unsteadiness.    We had ordered an ambulatory ECG last time however she did not have this done because she was going to a reunion and this fell through the cracks.  After further discussing with her today, this may be more appropriately referred to ENT.  She has undergone CABG in the past and is due for a stress test.  We will order this.    Assessment & Plan    Impressions:  Coronary artery disease status post two-vessel CABG in May 2015      SRINATH-RCA, LIMA-LAD  Hypertension  Hypertensive cardiovascular disease  Hyperlipidemia  Carotid artery disease status post carotid endarterectomy  Unsteady gait with frequent  "falls    Recommendations:  Continuation of her current cardiovascular regimen at the present time.     This includes antiplatelet therapy, antihypertensives, short acting nitrates, and statin therapy.  Nuclear stress test  ENT referral  Follow-up in 6 weeks      Diagnoses and all orders for this visit:    1. Arteriosclerosis of coronary artery (Primary)  -     Stress Test With Myocardial Perfusion One Day; Future  -     ECG 12 Lead    2. Coronary artery disease with angina pectoris, unspecified vessel or lesion type, unspecified whether native or transplanted heart  -     Stress Test With Myocardial Perfusion One Day; Future  -     ECG 12 Lead    3. Essential hypertension  -     Stress Test With Myocardial Perfusion One Day; Future  -     ECG 12 Lead    4. Mixed hyperlipidemia  -     Stress Test With Myocardial Perfusion One Day; Future  -     ECG 12 Lead    5. Status post coronary artery bypass graft  -     Stress Test With Myocardial Perfusion One Day; Future  -     ECG 12 Lead    6. Fall, subsequent encounter  -     Stress Test With Myocardial Perfusion One Day; Future  -     ECG 12 Lead            Objective:    Vitals:  Vitals:    01/03/24 1512   BP: 124/62   Pulse: 63   SpO2: 99%   Weight: 69.4 kg (153 lb)   Height: 157.5 cm (62\")       Body mass index is 27.98 kg/m².      Physical Exam:    General: Alert, cooperative, no distress, appears stated age  Head:  Normocephalic, atraumatic, mucous membranes moist  Eyes:  Conjunctiva/corneas clear, EOM's intact     Neck:  Supple, bilateral carotid bruits  Lungs: Clear to auscultation bilaterally, no wheezes rhonchi rales are noted  Chest wall: No tenderness  Heart::  Regular rate and rhythm, S1 and S2 normal, 1/6 holosystolic murmur.  No, rub or gallop  Abdomen: Soft, non-tender, nondistended bowel sounds active  Extremities: No cyanosis, clubbing, or edema  Pulses: 2+ and symmetric all extremities  Skin:  Bruising and abrasions.  Neuro/psych: A&O x3. CN II through " XII are grossly intact with appropriate affect      Allergies:  Allergies   Allergen Reactions    Codeine Unknown - Low Severity     Pt Cannot remember    Levaquin [Levofloxacin] GI Intolerance    Penicillins Rash    Sulfa Antibiotics Unknown (See Comments) and Rash       Medication Review:     Current Outpatient Medications:     aspirin 81 MG tablet, Take 1 tablet by mouth Every Night. HOLD DOS, Disp: , Rfl:     brinzolamide (AZOPT) 1 % ophthalmic suspension, 1 drop 3 (Three) Times a Day., Disp: , Rfl:     escitalopram (LEXAPRO) 5 MG tablet, Take 1 tablet by mouth Every Night., Disp: , Rfl:     ezetimibe (ZETIA) 10 MG tablet, TAKE 1 TABLET BY MOUTH DAILY, Disp: 90 tablet, Rfl: 1    hydroCHLOROthiazide (HYDRODIURIL) 12.5 MG tablet, TAKE 1 TABLET BY MOUTH DAILY FOR FLUID RETENTION, Disp: , Rfl:     losartan (COZAAR) 50 MG tablet, Take 1.5 tablets by mouth Daily. Take 1 1/2 tab daily, Disp: , Rfl:     metoprolol succinate XL (TOPROL-XL) 25 MG 24 hr tablet, TAKE 1 TABLET BY MOUTH DAILY, Disp: 90 tablet, Rfl: 2    pantoprazole (PROTONIX) 40 MG EC tablet, TAKE 1 TABLET BY MOUTH DAILY, Disp: 90 tablet, Rfl: 0    Probiotic Product (Risaquad-2) capsule capsule, Take 1 capsule by mouth Daily., Disp: , Rfl:     rosuvastatin (CRESTOR) 10 MG tablet, TAKE 1 TABLET BY MOUTH DAILY, Disp: 90 tablet, Rfl: 1    temazepam (RESTORIL) 15 MG capsule, Take 1 capsule by mouth At Night As Needed., Disp: , Rfl:     vitamin D3 125 MCG (5000 UT) capsule capsule, Take 1 capsule by mouth Daily., Disp: , Rfl:     VITAMIN K PO, Take  by mouth., Disp: , Rfl:     azithromycin (Zithromax Z-Hossein) 250 MG tablet, Take 2 tablets by mouth on day 1, then 1 tablet daily on days 2-5, Disp: 6 tablet, Rfl: 0    Family History:  Family History   Problem Relation Age of Onset    Diabetes Mother     Malig Hyperthermia Neg Hx        Past Medical History:  Past Medical History:   Diagnosis Date    Anxiety     Arthritis     Atrial fibrillation     Cancer     Left  breast Cancer and Skin cancer    Coronary artery disease     Frequent UTI     GERD (gastroesophageal reflux disease)     Hyperlipidemia     Hypertension     MRSA infection 2022    LEFT FINGER/ [facility] [place]    Non-healing skin lesion     LEFT INDEX FINGER    Palpitations     Sleep apnea        Past Surgical History:  Past Surgical History:   Procedure Laterality Date    ANGIOPLASTY SUBCLAVIAN ARTERY Left 2022    Procedure: LEFT SUBCLAVIAN ARTERY STENT PLACEMENT;  Surgeon: Dilshad Domingo MD;  [facility] ;  Service: Vascular;  Laterality: Left;    BREAST LUMPECTOMY Left     CARDIAC CATHETERIZATION      CAROTID ENDARTERECTOMY Left 2022    Procedure: LEFT CAROTID ENDARTERECTOMY;  Surgeon: Dilshad Domingo MD;  Location: [facility] MAIN OR;  Service: Vascular;  Laterality: Left;    CHOLECYSTECTOMY      CORONARY ARTERY BYPASS GRAFT      HYSTERECTOMY         Social History:  Social History     Socioeconomic History    Marital status:    Tobacco Use    Smoking status: Former     Packs/day: 1.50     Years: 50.00     Additional pack years: 0.00     Total pack years: 75.00     Types: Cigarettes     Quit date:      Years since quittin.0    Smokeless tobacco: Never   Vaping Use    Vaping Use: Never used   Substance and Sexual Activity    Alcohol use: Yes     Alcohol/week: 7.0 standard drinks of alcohol     Types: 7 Glasses of wine per week     Comment: routinely     Drug use: No    Sexual activity: Defer       Review of Systems:  The following systems were reviewed as they relate to the cardiovascular system: Constitutional, Eyes, ENT, Cardiovascular, Respiratory, Gastrointestinal, Integumentary, Neurological, Psychiatric, Hematologic, Endocrine, Musculoskeletal, and Genitourinary. The pertinent cardiovascular findings are reported above with all other cardiovascular points within those systems being negative.    Diagnostic Study Review:     Current  "Electrocardiogram:    ECG 12 Lead    Date/Time: 1/3/2024 4:39 PM  Performed by: Jagdeep Perez DO    Authorized by: Jagdeep Perez DO  Comparison: not compared with previous ECG   Previous ECG: no previous ECG available  Comments: Normal sinus rhythm with a ventricular rate of 63 bpm.  Atrial premature complex.  Consider right ventricular hypertrophy.  Normal QT and QTc.          Laboratory Data:  Lab Results   Component Value Date    GLUCOSE 104 (H) 10/09/2023    BUN 19 10/09/2023    CREATININE 0.55 (L) 10/09/2023    BCR 34.5 (H) 10/09/2023    K 4.0 10/09/2023    CO2 25.0 10/09/2023    CALCIUM 9.1 10/09/2023    ALBUMIN 3.6 10/05/2023    LABIL2 1.3 01/23/2020    AST 13 10/05/2023    ALT 8 10/05/2023     Lab Results   Component Value Date    GLUCOSE 104 (H) 10/09/2023    CALCIUM 9.1 10/09/2023     10/09/2023    K 4.0 10/09/2023    CO2 25.0 10/09/2023     10/09/2023    BUN 19 10/09/2023    CREATININE 0.55 (L) 10/09/2023    BCR 34.5 (H) 10/09/2023    ANIONGAP 9.0 10/09/2023     Lab Results   Component Value Date    WBC 6.60 10/09/2023    HGB 12.3 10/09/2023    HCT 38.1 10/09/2023    MCV 90.8 10/09/2023     10/09/2023     Lab Results   Component Value Date    CHOL 118 04/15/2022    TRIG 96 04/15/2022    HDL 46 04/15/2022    LDL 54 04/15/2022     No results found for: \"HGBA1C\"  No results found for: \"INR\", \"PROTIME\"    Most Recent Echo:  Results for orders placed during the hospital encounter of 01/05/23    Adult Transthoracic Echo Complete w/ Color, Spectral and Contrast if necessary per protocol    Interpretation Summary    Left ventricular systolic function is normal. Left ventricular ejection fraction appears to be 56 - 60%.    Left ventricular wall thickness is consistent with mild concentric hypertrophy.    There is mild, posterior mitral leaflet thickening present.    Estimated right ventricular systolic pressure from tricuspid regurgitation is normal (<35 mmHg).   "     Most Recent Stress Test:  Results for orders placed during the hospital encounter of 07/20/21    Stress Test With Myocardial Perfusion One Day    Interpretation Summary  · Myocardial perfusion imaging indicates a normal myocardial perfusion study with no evidence of ischemia.  · Fixed apical defect likely secondary to technical factors given normal wall motion in this area  · Left ventricular ejection fraction is hyperdynamic (Calculated EF > 70%). .  · Findings consistent with a normal ECG stress test.  · Impressions are consistent with a low risk study.  · There is no prior study available for comparison.       Most Recent Cardiac Catheterization:   No results found for this or any previous visit.       NOTE: The following portions of the patient's note were reviewed, confirmed and/or updated this visit as appropriate: History of present illness/Interval history, physical examination, assessment & plan, allergies, current medications, past family history, past medical history, past social history, past surgical history and problem list.

## 2024-01-18 ENCOUNTER — HOSPITAL ENCOUNTER (OUTPATIENT)
Dept: CARDIOLOGY | Facility: HOSPITAL | Age: 84
Discharge: HOME | End: 2024-01-18
Payer: MEDICARE

## 2024-01-18 DIAGNOSIS — I10 ESSENTIAL HYPERTENSION: ICD-10-CM

## 2024-01-18 DIAGNOSIS — Z95.1 STATUS POST CORONARY ARTERY BYPASS GRAFT: ICD-10-CM

## 2024-01-18 DIAGNOSIS — I25.119 CORONARY ARTERY DISEASE WITH ANGINA PECTORIS, UNSPECIFIED VESSEL OR LESION TYPE, UNSPECIFIED WHETHER NATIVE OR TRANSPLANTED HEART: ICD-10-CM

## 2024-01-18 DIAGNOSIS — I25.10 ARTERIOSCLEROSIS OF CORONARY ARTERY: ICD-10-CM
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
## 2024-01-18 DIAGNOSIS — E78.2 MIXED HYPERLIPIDEMIA: ICD-10-CM

## 2024-01-18 PROCEDURE — A9500 TC99M SESTAMIBI: HCPCS | Performed by: INTERNAL MEDICINE

## 2024-01-18 PROCEDURE — 0 TECHNETIUM SESTAMIBI: Performed by: INTERNAL MEDICINE

## 2024-01-18 PROCEDURE — 25010000002 REGADENOSON 0.4 MG/5ML SOLUTION: Performed by: INTERNAL MEDICINE

## 2024-01-18 PROCEDURE — 93017 CV STRESS TEST TRACING ONLY: CPT

## 2024-01-18 PROCEDURE — 78452 HT MUSCLE IMAGE SPECT MULT: CPT

## 2024-01-18 RX ORDER — REGADENOSON 0.08 MG/ML
0.4 INJECTION, SOLUTION INTRAVENOUS
Status: COMPLETED | OUTPATIENT
Start: 2024-01-18 | End: 2024-01-18

## 2024-01-18 RX ADMIN — TECHNETIUM TC 99M SESTAMIBI 1 DOSE: 1 INJECTION INTRAVENOUS at 09:17

## 2024-01-18 RX ADMIN — TECHNETIUM TC 99M SESTAMIBI 1 DOSE: 1 INJECTION INTRAVENOUS at 10:18

## 2024-01-18 RX ADMIN — REGADENOSON 0.4 MG: 0.08 INJECTION, SOLUTION INTRAVENOUS at 10:18

## 2024-01-19 LAB
BH CV STRESS BP STAGE 1: NORMAL
BH CV STRESS COMMENTS STAGE 1: NORMAL
BH CV STRESS DOSE REGADENOSON STAGE 1: 0.4
BH CV STRESS DURATION MIN STAGE 1: 0
BH CV STRESS DURATION SEC STAGE 1: 10
BH CV STRESS HR STAGE 1: 80
BH CV STRESS PROTOCOL 1: NORMAL
BH CV STRESS RECOVERY BP: NORMAL MMHG
BH CV STRESS RECOVERY HR: 82 BPM
BH CV STRESS STAGE 1: 1
LV EF NUC BP: 65 %
MAXIMAL PREDICTED HEART RATE: 138 BPM
PERCENT MAX PREDICTED HR: 57.97 %
STRESS BASELINE BP: NORMAL MMHG
STRESS BASELINE HR: 60 BPM
STRESS PERCENT HR: 68 %
STRESS POST PEAK BP: NORMAL MMHG
STRESS POST PEAK HR: 80 BPM
STRESS TARGET HR: 117 BPM

## 2024-01-24 ENCOUNTER — TELEPHONE (OUTPATIENT)
Dept: CARDIOLOGY | Facility: CLINIC | Age: 84
End: 2024-01-24
Payer: MEDICARE

## 2024-01-24 NOTE — TELEPHONE ENCOUNTER
Caller: Elizabeth Whaley    Relationship: Self    Best call back number: [phone]    Caller requesting test results: PATIENT    What test was performed: STRESS TEST    When was the test performed: 1/18/24    Where was the test performed: [facility]    Additional notes: PT IS CALLING TO GET RESULTS FROM STRESS TEST

## 2024-02-15 PROBLEM — R26.81 UNSTEADY GAIT: Status: ACTIVE | Noted: 2024-02-15

## 2024-02-16 ENCOUNTER — OFFICE VISIT (OUTPATIENT)
Dept: CARDIOLOGY | Facility: CLINIC | Age: 84
End: 2024-02-16
Payer: MEDICARE

## 2024-02-16 VITALS
SYSTOLIC BLOOD PRESSURE: 138 MMHG | BODY MASS INDEX: 27.05 KG/M2 | DIASTOLIC BLOOD PRESSURE: 73 MMHG | OXYGEN SATURATION: 99 % | HEIGHT: 62 IN | HEART RATE: 77 BPM | WEIGHT: 147 LBS

## 2024-02-16 DIAGNOSIS — E78.2 MIXED HYPERLIPIDEMIA: ICD-10-CM

## 2024-02-16 DIAGNOSIS — Z95.1 STATUS POST CORONARY ARTERY BYPASS GRAFT: ICD-10-CM

## 2024-02-16 DIAGNOSIS — Z79.02 LONG TERM (CURRENT) USE OF ANTITHROMBOTICS/ANTIPLATELETS: ICD-10-CM

## 2024-02-16 DIAGNOSIS — R26.81 UNSTEADY GAIT: ICD-10-CM

## 2024-02-16 DIAGNOSIS — I10 ESSENTIAL HYPERTENSION: ICD-10-CM

## 2024-02-16 DIAGNOSIS — I25.119 CORONARY ARTERY DISEASE WITH ANGINA PECTORIS, UNSPECIFIED VESSEL OR LESION TYPE, UNSPECIFIED WHETHER NATIVE OR TRANSPLANTED HEART: ICD-10-CM

## 2024-02-16 DIAGNOSIS — I25.10 ARTERIOSCLEROSIS OF CORONARY ARTERY: Primary | ICD-10-CM

## 2024-02-19 RX ORDER — METOPROLOL SUCCINATE 25 MG/1
25 TABLET, EXTENDED RELEASE ORAL DAILY
Qty: 90 TABLET | Refills: 2 | Status: SHIPPED | OUTPATIENT
Start: 2024-02-19

## 2024-02-26 RX ORDER — PANTOPRAZOLE SODIUM 40 MG/1
40 TABLET, DELAYED RELEASE ORAL DAILY
Qty: 90 TABLET | Refills: 0 | Status: SHIPPED | OUTPATIENT
Start: 2024-02-26

## 2024-03-06 RX ORDER — EZETIMIBE 10 MG/1
10 TABLET ORAL DAILY
Qty: 90 TABLET | Refills: 0 | Status: SHIPPED | OUTPATIENT
Start: 2024-03-06

## 2024-03-19 RX ORDER — ROSUVASTATIN CALCIUM 10 MG/1
10 TABLET, COATED ORAL DAILY
Qty: 90 TABLET | Refills: 1 | Status: SHIPPED | OUTPATIENT
Start: 2024-03-19

## 2024-03-23 ENCOUNTER — APPOINTMENT (OUTPATIENT)
Dept: GENERAL RADIOLOGY | Facility: HOSPITAL | Age: 84
End: 2024-03-23
Payer: MEDICARE

## 2024-03-23 ENCOUNTER — HOSPITAL ENCOUNTER (EMERGENCY)
Facility: HOSPITAL | Age: 84
Discharge: HOME | End: 2024-03-23
Attending: EMERGENCY MEDICINE
Payer: MEDICARE

## 2024-03-23 ENCOUNTER — APPOINTMENT (OUTPATIENT)
Dept: CT IMAGING | Facility: HOSPITAL | Age: 84
End: 2024-03-23
Payer: MEDICARE

## 2024-03-23 VITALS
SYSTOLIC BLOOD PRESSURE: 139 MMHG | OXYGEN SATURATION: 95 % | DIASTOLIC BLOOD PRESSURE: 56 MMHG | BODY MASS INDEX: 27.41 KG/M2 | WEIGHT: 154.7 LBS | HEIGHT: 63 IN | HEART RATE: 83 BPM | RESPIRATION RATE: 18 BRPM | TEMPERATURE: 97.9 F

## 2024-03-23 DIAGNOSIS — S42.212A CLOSED DISPLACED FRACTURE OF SURGICAL NECK OF LEFT HUMERUS, UNSPECIFIED FRACTURE MORPHOLOGY, INITIAL ENCOUNTER: Primary | ICD-10-CM

## 2024-03-23 PROCEDURE — 99284 EMERGENCY DEPT VISIT MOD MDM: CPT

## 2024-03-23 PROCEDURE — 73030 X-RAY EXAM OF SHOULDER: CPT

## 2024-03-23 PROCEDURE — 73200 CT UPPER EXTREMITY W/O DYE: CPT

## 2024-03-23 RX ORDER — ACETAMINOPHEN 500 MG
1000 TABLET ORAL ONCE
Status: COMPLETED | OUTPATIENT
Start: 2024-03-23 | End: 2024-03-23

## 2024-03-23 RX ADMIN — ACETAMINOPHEN 1000 MG: 500 TABLET ORAL at 17:13

## 2024-03-23 NOTE — FSED PROVIDER NOTE
EMERGENCY DEPARTMENT ENCOUNTER    Room Number:  08/08  Date seen:  3/23/2024  Time seen: 17:04 EDT  PCP: Gerda Wadsworth MD  Historian: Patient    Discussed/obtained information from independent historians: N/A    HPI:  Chief complaint: Mechanical fall with left shoulder pain  A complete HPI/ROS/PMH/PSH/SH/FH are unobtainable due to: Nothing  Context:Elizabeth Whaley is a 83 y.o. female who presents to the ED with c/o mechanical fall with left shoulder pain.  Patient states she was in a parking lot and tripped over a concrete parking block.  She states her right toe caught the corner of the block causing her to fall forward landing on her left shoulder.  She did sustain a small abrasion to the left knee and left elbow but denies pain at these sites.  She states she is able to ambulate without any difficulty.  She denies hitting her head, LOC, nausea, vomiting, headache.  She is not on anticoagulant therapy.  Her primary complaint is her left shoulder.  She describes the pain in the left shoulder as a 7 out of 10 at rest and states it is worse if she attempts to move it.  The pain does not radiate and is localized to the left shoulder.    In addition to the above patient denies sustaining injury to abdomen or chest wall, neck, mid back, lower back.  She states she is able to take a deep breath and move about without any pain in these areas.    External (non-ED) record review: Patient followed by Dr. Smith with [facility] cardiology.  She was last seen 12/29/2022.  This appeared to be a follow-up for CAD, hypertension, hypertensive CVD, hyperlipidemia.    Chronic or social conditions impacting care:    ALLERGIES  Codeine, Levaquin [levofloxacin], Penicillins, and Sulfa antibiotics    PAST MEDICAL HISTORY  Active Ambulatory Problems     Diagnosis Date Noted    Coronary artery disease with angina pectoris 09/20/2019    Diaphoresis 09/20/2019    Status post coronary artery bypass graft 05/27/2015    Essential  hypertension 04/29/2020    Mixed hyperlipidemia 04/29/2020    Long term (current) use of antithrombotics/antiplatelets 04/29/2020    First degree AV block 06/10/2021    Hyperlipemia 01/24/2013    Hypertriglyceridemia 05/28/2019    Prophylactic use of selective estrogen receptor modulators (SERMs) 05/27/2015    Arteriosclerosis of coronary artery 01/24/2013    Vertigo 05/20/2014    Urinary urgency 05/14/2018    Ruptured abdominal aortic aneurysm 01/22/2020    Petechiae 09/17/2015    Personal history of breast cancer 02/23/2013    Osteoporosis 02/23/2013    Microcytic anemia 05/10/2016    Mass of breast, right 01/22/2020    Lymphadenopathy 04/11/2018    Lung nodules 01/22/2020    Intra-abdominal and pelvic swelling, mass and lump, unspecified site 05/27/2015    Increased frequency of urination 05/14/2018    MAX (iron deficiency anemia) 05/17/2016    Hypoestrinism 01/24/2013    GERD (gastroesophageal reflux disease) 01/24/2013    Difficult or painful urination 05/14/2018    Bradycardia 05/10/2013    Atrophic vaginitis 04/09/2013    Atrial fibrillation 08/27/2015    Bilateral carotid artery stenosis 03/04/2022    Carotid stenosis, asymptomatic, bilateral 04/14/2022    Unsteady gait 02/15/2024     Resolved Ambulatory Problems     Diagnosis Date Noted    No Resolved Ambulatory Problems     Past Medical History:   Diagnosis Date    Anxiety     Arthritis     Cancer     Coronary artery disease     Frequent UTI     Hyperlipidemia     Hypertension     MRSA infection 07/06/2022    Non-healing skin lesion     Palpitations     Sleep apnea        PAST SURGICAL HISTORY  Past Surgical History:   Procedure Laterality Date    ANGIOPLASTY SUBCLAVIAN ARTERY Left 8/12/2022    Procedure: LEFT SUBCLAVIAN ARTERY STENT PLACEMENT;  Surgeon: Dilshad Domingo MD;  Location: [facility] 18/19;  Service: Vascular;  Laterality: Left;    BREAST LUMPECTOMY Left     CARDIAC CATHETERIZATION      CAROTID ENDARTERECTOMY Left 4/14/2022     Procedure: LEFT CAROTID ENDARTERECTOMY;  Surgeon: Dilshad Domingo MD;  Location: [facility] MAIN OR;  Service: Vascular;  Laterality: Left;    CHOLECYSTECTOMY      CORONARY ARTERY BYPASS GRAFT      HYSTERECTOMY         FAMILY HISTORY  Family History   Problem Relation Age of Onset    Diabetes Mother     Malig Hyperthermia Neg Hx        SOCIAL HISTORY  Social History     Socioeconomic History    Marital status:    Tobacco Use    Smoking status: Former     Current packs/day: 0.00     Average packs/day: 1.5 packs/day for 50.0 years (75.0 ttl pk-yrs)     Types: Cigarettes     Start date:      Quit date:      Years since quittin.2     Passive exposure: Past    Smokeless tobacco: Never   Vaping Use    Vaping status: Never Used   Substance and Sexual Activity    Alcohol use: Yes     Alcohol/week: 7.0 standard drinks of alcohol     Types: 7 Glasses of wine per week     Comment: routinely     Drug use: No    Sexual activity: Defer       REVIEW OF SYSTEMS  Review of Systems    All systems reviewed and negative except for those discussed in HPI.     PHYSICAL EXAM    I have reviewed the triage vital signs and nursing notes.  Vitals:    24 1652   BP: 139/56   Pulse: 83   Resp: 18   Temp: 97.9 °F (36.6 °C)   SpO2: 95%     Physical Exam    GENERAL: WDWN female, nontoxic but does appear uncomfortable  HENT: nares patent  EYES: no scleral icterus  NECK: no ROM limitations  CV: regular rhythm, regular rate  RESPIRATORY: normal effort  ABDOMEN: soft  : deferred  MUSCULOSKELETAL: Left knee: No bony tenderness patient bending moving and ambulating without difficulty.  Left shoulder: Soft tissue swelling and TTP in the vicinity of the humeral head.  No obvious sulcus sign or dislocation.  NEURO: alert, moves all extremities, follows commands  SKIN: Very superficial abrasion overlying the left knee    LAB RESULTS  No results found for this or any previous visit (from the past 24 hour(s)).    Ordered the above  labs and independently interpreted results.  My findings will be discussed in the ED course or medical decision making section below    RADIOLOGY RESULTS  CT Upper Extremity Left Without Contrast    Result Date: 3/23/2024  CT UPPER EXTREMITY LEFT WO CONTRAST Date of Exam: 3/23/2024 6:20 PM EDT Indication: Eval left Humerus fx. Comparison: None available. Technique: Axial CT images were obtained of the left upper extremity without contrast administration.  Sagittal and coronal reconstructions were performed.  Automated exposure control and iterative reconstruction methods were used. Findings: The left clavicle is intact. Comminuted left proximal humeral head and neck fracture is impacted and angulated. The glenoid rim appears intact. The upper ribs are normal in the left. Subpleural fibrotic changes in the left upper lobe. Postsurgical changes related to prior cardiac bypass. Calcification of the aortic arch. No lytic or blastic disease.     Comminuted impacted fracture of the left humeral head and neck. No additional fractures are appreciated. Electronically Signed: Luca Moran MD  3/23/2024 6:54 PM EDT  Workstation ID: [id number]    XR Shoulder 2+ View Left    Result Date: 3/23/2024  XR SHOULDER 2+ VW LEFT Date of Exam: 3/23/2024 5:05 PM EDT Indication: fall Comparison: None available. Findings: There is a transverse fracture with impaction and mild displacement involving the humeral neck. The humeral head is displaced caudally related to lipohemarthrosis. The clavicle is intact. AC joint maintained in alignment. Included portions of the left upper lung are clear. Prior sternotomy and CABG partially imaged. Scapula appears intact.     Impression: 1. Transverse fracture with impaction involving the humeral neck. 2. Large lipohemarthrosis. Electronically Signed: Issac Garcia MD  3/23/2024 5:19 PM EDT  Workstation ID: [id number]      Ordered the above noted radiological studies.  Independently interpreted by me.  My  findings will be discussed in the medical decision section below.     PROGRESS, DATA ANALYSIS, CONSULTS AND MEDICAL DECISION MAKING    Please note that this section constitutes my independent interpretation of clinical data including lab results, radiology, EKG's.  This constitutes my independent professional opinion regarding differential diagnosis and management of this patient.  It may include any factors such as history from outside sources, review of external records, social determinants of health, management of medications, response to those treatments, and discussions with other providers.    ED Course as of 03/23/24 1924   Sat Mar 23, 2024   1656 BP: 139/56 [RC]   1656 Heart Rate: 83 [RC]   1656 Resp: 18 [RC]   1712 I viewed the patient's left shoulder x-ray.  She appears to have a displaced humeral neck fracture.  There is no dislocation.  There is significant displacement.  Will touch down with Ortho prior to disposition. [RC]   1747 Discussed with Dr. Head on-call Ortho.  To obtain CT for further evaluation.  She can see the patient in consultation on Monday or Wednesday morning. [RC]   1828 Reassessed the patient after sling application going over CT and x-ray results with the patient, she tells me she has an orthopedist named Dr. Esquivel that did elbow surgery on a roughly a year ago.  She states she will call Dr. Esquivel Monday and follow-up with him later this week. [RC]   1830 Patient states she is feeling much better after the sling and the Tylenol.  She declines pain medicine.  She would like to go home at this time.  Will DC.  She knows to return should she need further pain control, develop any new symptoms we did not address at this visit, or should she have any further concerns. [RC]      ED Course User Index  [RC] Luis Champagne III, PA     Orders placed during this visit:  Orders Placed This Encounter   Procedures    XR Shoulder 2+ View Left    CT Upper Extremity Left Without Contrast     ED Acknowledgement Form Needed;            Medical Decision Making  Problems Addressed:  Closed displaced fracture of surgical neck of left humerus, unspecified fracture morphology, initial encounter: complicated acute illness or injury    Amount and/or Complexity of Data Reviewed  Radiology: ordered.    Risk  OTC drugs.            DIAGNOSIS  Final diagnoses:   Closed displaced fracture of surgical neck of left humerus, unspecified fracture morphology, initial encounter          Medication List      No changes were made to your prescriptions during this visit.         FOLLOW-UP  Alejandro Esquivel MD  [address] [place] [address]  [phone]    Schedule an appointment as soon as possible for a visit   For further evaluation and treatment        Latest Documented Vital Signs:  As of 19:24 EDT  BP- 139/56 HR- 83 Temp- 97.9 °F (36.6 °C) (Temporal) O2 sat- 95%    Appropriate PPE utilized throughout this patient encounter to include mask, if indicated, per current protocol. Hand hygiene was performed before donning PPE and after removal when leaving the room.    Please note that portions of this were completed with a voice recognition program.     Note Disclaimer: At [facility], we believe that sharing information builds trust and better relationships. You are receiving this note because you are receiving care at [facility] or recently visited. It is possible you will see health information before a provider has talked with you about it. This kind of information can be easy to misunderstand. To help you fully understand what it means for your health, we urge you to discuss this note with your provider.

## 2024-05-28 RX ORDER — PANTOPRAZOLE SODIUM 40 MG/1
40 TABLET, DELAYED RELEASE ORAL DAILY
Qty: 90 TABLET | Refills: 0 | Status: SHIPPED | OUTPATIENT
Start: 2024-05-28

## 2024-06-03 RX ORDER — EZETIMIBE 10 MG/1
10 TABLET ORAL DAILY
Qty: 90 TABLET | Refills: 0 | Status: SHIPPED | OUTPATIENT
Start: 2024-06-03

## 2024-07-19 PROBLEM — I73.9 PAD (PERIPHERAL ARTERY DISEASE): Status: ACTIVE | Noted: 2024-07-19

## 2024-07-22 ENCOUNTER — HOSPITAL ENCOUNTER (OUTPATIENT)
Dept: CARDIOLOGY | Facility: HOSPITAL | Age: 84
Discharge: HOME | End: 2024-07-22
Payer: MEDICARE

## 2024-07-22 ENCOUNTER — OFFICE VISIT (OUTPATIENT)
Age: 84
End: 2024-07-22
Payer: MEDICARE

## 2024-07-22 VITALS
WEIGHT: 146 LBS | BODY MASS INDEX: 25.87 KG/M2 | SYSTOLIC BLOOD PRESSURE: 152 MMHG | HEART RATE: 75 BPM | OXYGEN SATURATION: 97 % | DIASTOLIC BLOOD PRESSURE: 50 MMHG | HEIGHT: 63 IN

## 2024-07-22 DIAGNOSIS — R09.89 WEAK PULSE: ICD-10-CM

## 2024-07-22 DIAGNOSIS — I65.23 CAROTID STENOSIS, BILATERAL: Primary | ICD-10-CM

## 2024-07-22 DIAGNOSIS — R09.89 BRUIT: ICD-10-CM

## 2024-07-22 DIAGNOSIS — I77.1 SUBCLAVIAN ARTERIAL STENOSIS: ICD-10-CM

## 2024-07-22 LAB
BH CV UPPER ARTERIAL LEFT 2ND DIGIT SYS MAX: NORMAL
BH CV UPPER ARTERIAL LEFT FBI 2ND DIGIT RATIO: NORMAL
BH CV UPPER ARTERIAL LEFT WBI RATIO: 1.3
BH CV UPPER ARTERIAL RIGHT 2ND DIGIT SYS MAX: 120
BH CV UPPER ARTERIAL RIGHT FBI 2ND DIGIT RATIO: 0.98
BH CV UPPER ARTERIAL RIGHT WBI RATIO: 1.34
BH CV XLRA MEAS LEFT DIST CCA EDV: -17.6 CM/SEC
BH CV XLRA MEAS LEFT DIST CCA PSV: -180 CM/SEC
BH CV XLRA MEAS LEFT DIST ICA EDV: -9.8 CM/SEC
BH CV XLRA MEAS LEFT DIST ICA PSV: -102 CM/SEC
BH CV XLRA MEAS LEFT ICA/CCA RATIO: -1.04
BH CV XLRA MEAS LEFT PROX CCA EDV: 11 CM/SEC
BH CV XLRA MEAS LEFT PROX CCA PSV: 226 CM/SEC
BH CV XLRA MEAS LEFT PROX ECA PSV: -109 CM/SEC
BH CV XLRA MEAS LEFT PROX ICA EDV: -14.3 CM/SEC
BH CV XLRA MEAS LEFT PROX ICA PSV: -235 CM/SEC
BH CV XLRA MEAS LEFT PROX SCLA PSV: 265 CM/SEC
BH CV XLRA MEAS LEFT VERTEBRAL A EDV: 11.9 CM/SEC
BH CV XLRA MEAS LEFT VERTEBRAL A PSV: 93.9 CM/SEC
BH CV XLRA MEAS RIGHT DIST CCA EDV: 9.3 CM/SEC
BH CV XLRA MEAS RIGHT DIST CCA PSV: 130 CM/SEC
BH CV XLRA MEAS RIGHT DIST ICA EDV: -18 CM/SEC
BH CV XLRA MEAS RIGHT DIST ICA PSV: -91.7 CM/SEC
BH CV XLRA MEAS RIGHT ICA/CCA RATIO: -1.91
BH CV XLRA MEAS RIGHT PROX CCA EDV: -8.7 CM/SEC
BH CV XLRA MEAS RIGHT PROX CCA PSV: -130 CM/SEC
BH CV XLRA MEAS RIGHT PROX ECA PSV: -114 CM/SEC
BH CV XLRA MEAS RIGHT PROX ICA EDV: 34.1 CM/SEC
BH CV XLRA MEAS RIGHT PROX ICA PSV: 248 CM/SEC
BH CV XLRA MEAS RIGHT PROX SCLA PSV: 287 CM/SEC
BH CV XLRA MEAS RIGHT VERTEBRAL A EDV: 14.1 CM/SEC
BH CV XLRA MEAS RIGHT VERTEBRAL A PSV: 66.6 CM/SEC
UPPER ARTERIAL LEFT ARM RADIAL SYS MAX: 158
UPPER ARTERIAL LEFT ARM ULNAR SYS MAX: 140
UPPER ARTERIAL RIGHT ARM BRACHIAL SYS MAX: 122
UPPER ARTERIAL RIGHT ARM RADIAL SYS MAX: 163
UPPER ARTERIAL RIGHT ARM ULNAR SYS MAX: 150

## 2024-07-22 PROCEDURE — 93922 UPR/L XTREMITY ART 2 LEVELS: CPT | Performed by: SURGERY

## 2024-07-22 PROCEDURE — 1160F RVW MEDS BY RX/DR IN RCRD: CPT | Performed by: SURGERY

## 2024-07-22 PROCEDURE — 3077F SYST BP >= 140 MM HG: CPT | Performed by: SURGERY

## 2024-07-22 PROCEDURE — 93922 UPR/L XTREMITY ART 2 LEVELS: CPT

## 2024-07-22 PROCEDURE — 99214 OFFICE O/P EST MOD 30 MIN: CPT | Performed by: SURGERY

## 2024-07-22 PROCEDURE — 3078F DIAST BP <80 MM HG: CPT | Performed by: SURGERY

## 2024-07-22 PROCEDURE — 93880 EXTRACRANIAL BILAT STUDY: CPT | Performed by: SURGERY

## 2024-07-22 PROCEDURE — 1159F MED LIST DOCD IN RCRD: CPT | Performed by: SURGERY

## 2024-07-22 PROCEDURE — 93880 EXTRACRANIAL BILAT STUDY: CPT

## 2024-07-22 RX ORDER — NITROFURANTOIN MACROCRYSTALS 50 MG/1
50 CAPSULE ORAL DAILY
COMMUNITY
Start: 2024-07-10

## 2024-07-22 NOTE — PROGRESS NOTES
"Chief Complaint  Follow-up (1 year F/U with CTD and [facility]BI same day)    Subjective        Elizabeth Whaley presents to [facility] VASCULAR SURGERY  History of Present Illness  Heeled left finger,  Multiple falls.  PCP aware.   Objective   Vital Signs:  /50 (BP Location: Right arm, Patient Position: Sitting)   Pulse 75   Ht 158.8 cm (62.5\")   Wt 66.2 kg (146 lb)   SpO2 97%   BMI 26.28 kg/m²   Estimated body mass index is 26.28 kg/m² as calculated from the following:    Height as of this encounter: 158.8 cm (62.5\").    Weight as of this encounter: 66.2 kg (146 lb).     BMI is >= 25 and <30. (Overweight) The following options were offered after discussion;: weight loss educational material (shared in after visit summary)    Elizabeth Whaley  reports that she quit smoking about 9 years ago. Her smoking use included cigarettes. She started smoking about 59 years ago. She has a 75 pack-year smoking history. She has been exposed to tobacco smoke. She has never used smokeless tobacco.      Physical Exam  Constitutional:       Appearance: She is well-developed.   Pulmonary:      Effort: Pulmonary effort is normal. No respiratory distress.   Abdominal:      General: There is no distension.      Palpations: Abdomen is soft.   Neurological:      Mental Status: She is alert and oriented to person, place, and time.      Palp left radial pulse  Heeled left index finger.     Result Review :    The following data was reviewed by: Dilshad Domingo MD on 07/22/24  CBC          10/5/2023    05:10 10/9/2023    04:30   CBC   WBC 5.80  6.60    RBC 4.05  4.19    Hemoglobin 12.0  12.3    Hematocrit 36.4  38.1    MCV 89.9  90.8    MCH 29.6  29.4    MCHC 33.0  32.4    RDW 13.4  13.8    Platelets 164  198       BMP          10/5/2023    05:10 10/9/2023    04:30   BMP   BUN 13  19    Creatinine 0.51  0.55    Sodium 140  137    Potassium 4.1  4.0    Chloride 103  103    CO2 28.0  25.0    Calcium 8.9  9.1           Data " reviewed : Radiologic studies as below and Cardiology studies as below  Vascular Surgical History:  4/14/2022:  Left CEA (NTS)  8/12/2022:  Left SCA stent (NTS)    Vascular Imaging History:  Carotid duplex:  10/27/2023:Bilateral 50 to 60% stenosis, right side to 48/34 with ratio of 1.9 left side to 35/14 ratio of 1.0    Upper extremity arterial Doppler:  7/22/2024: Bilateral upper extremity pressures normal.         Assessment and Plan     Vascular surgery following for:  Carotid artery stenosis.  Left subclavian artery occlusion.      Diagnoses and all orders for this visit:    1. Carotid stenosis, bilateral (Primary)  -     Duplex Carotid Ultrasound CAR; Future  -     Doppler Arterial Multi Level Upper Extremity - Bilateral CAR; Future    2. Subclavian arterial stenosis  -     Duplex Carotid Ultrasound CAR; Future  -     Doppler Arterial Multi Level Upper Extremity - Bilateral CAR; Future     Pt left finger able to heel after SCA stent.  Stable carotid duplex.  Good medical therapy.    Asked her to contact her PCP for ongoing evaluation of falls and dizziness.        Vascular medical management:Cont ASA 81 mg and Crestor 10 mg daily.   Follow Up     Return in about 1 year (around 7/22/2025) for Follow up with vascular ultrasound.  Patient was given instructions and counseling regarding her condition or for health maintenance advice. Please see specific information pulled into the AVS if appropriate.

## 2024-07-22 NOTE — PATIENT INSTRUCTIONS
Nutrition and Heart Health  In this video, you'll learn why nutrition is an important part of a healthy lifestyle, especially if you have heart disease.  To view the content, go to this web address:  [email or web address]    This video will  on: 2026. If you need access to this video following this date, please reach out to the healthcare provider who assigned it to you.  This information is not intended to replace advice given to you by your health care provider. Make sure you discuss any questions you have with your health care provider.  Elsevier Patient Education ©  [employer]

## 2024-08-16 ENCOUNTER — OFFICE VISIT (OUTPATIENT)
Dept: CARDIOLOGY | Facility: CLINIC | Age: 84
End: 2024-08-16
Payer: MEDICARE

## 2024-08-16 VITALS
HEART RATE: 59 BPM | WEIGHT: 151 LBS | DIASTOLIC BLOOD PRESSURE: 60 MMHG | SYSTOLIC BLOOD PRESSURE: 122 MMHG | BODY MASS INDEX: 27.79 KG/M2 | HEIGHT: 62 IN | OXYGEN SATURATION: 99 %

## 2024-08-16 DIAGNOSIS — E78.2 MIXED HYPERLIPIDEMIA: ICD-10-CM

## 2024-08-16 DIAGNOSIS — Z95.1 STATUS POST CORONARY ARTERY BYPASS GRAFT: ICD-10-CM

## 2024-08-16 DIAGNOSIS — I25.119 CORONARY ARTERY DISEASE WITH ANGINA PECTORIS, UNSPECIFIED VESSEL OR LESION TYPE, UNSPECIFIED WHETHER NATIVE OR TRANSPLANTED HEART: Primary | ICD-10-CM

## 2024-08-16 DIAGNOSIS — I10 ESSENTIAL HYPERTENSION: ICD-10-CM

## 2024-08-16 DIAGNOSIS — Z79.02 LONG TERM (CURRENT) USE OF ANTITHROMBOTICS/ANTIPLATELETS: ICD-10-CM

## 2024-08-16 NOTE — PROGRESS NOTES
Cardiology Office Visit      Encounter Date:  08/16/2024    Patient ID:   Elizabeth Whaley is a 83 y.o. female.    Reason For Followup:  Coronary artery disease  Hypertension  Hypertensive cardiovascular disease  Hyperlipidemia  Antiplatelet therapy    Brief Clinical History:  Dear Dr. Wadsworth, Gerda Diamond MD    I had the pleasure of seeing Elizabeth Whaley today. As you are well aware, this is a 83 y.o. female with a history of coronary artery disease she is undergone coronary arterial bypass grafting in 2015.  She has additional history that includes hypertension, hypertensive cardiovascular disease, hyperlipidemia, and antiplatelet therapy.  She presents today for follow-up on the above conditions.    Interval History:  She denies any chest pain pressure heaviness or tightness.  She denies any shortness of breath.  She denies any PND orthopnea.  She denies any syncope or near syncope.    She has reported several falls.  She notes that she is not dizzy or lightheaded with this.  She does not have any chest pain or discomfort.  It fact she notes that this occurs exclusively when she makes a quick turn to the right while standing.  Does not occur while she is sitting.  She has suffered a fall requiring surgery for a fractured elbow.    She continues to work at [employer].  She is considering retiring because of her unsteadiness.    We had ordered an ambulatory ECG last time however she did not have this done because she was going to a reunion and this fell through the cracks.  After further discussing with her today, this may be more appropriately referred to ENT.  She has undergone CABG in the past and is due for a stress test.  We will order this.      02/16/2024        She went to an ENT and she is going to [facility] for vestibular therapy. She has not fallen since last episode. Working 2 days a week. She has a very big fear of falling. She is doing a lot of balance training. She had 3 gel shots in each knee and they feel much  "better. She reports feeling well from a cardiac perspective. She is getting a CT scan later today of the head.     She had a nuclear stress test performed in January 2024.  We reviewed results today.  No scintigraphic evidence for ischemia was noted.  EF 65% was noted.    08/16/2024        1 month after she was here last time, she fell again. She stopped to get some lottery tickets and she tripped on the curb and fractured her shoulder. No surgery but has some range of motion issues. She is no longer working. She is using a cane now. Gets annual visit next week.  She has no chest pain pressure heaviness or tightness.  No shortness of breath.    Assessment & Plan    Impressions:  Coronary artery disease status post two-vessel CABG in May 2015      SRINATH-RCA, ACOSTA-LAD      Most recent nuclear stress test January 2024 negative for ischemia  Hypertension  Hypertensive cardiovascular disease  Hyperlipidemia  Carotid artery disease status post carotid endarterectomy  Unsteady gait with frequent falls    Recommendations:  Continuation of her current cardiovascular regimen at the present time.     This includes antiplatelet therapy, antihypertensives, short acting nitrates, and statin therapy.  Follow-up in 6 months time sooner should there be difficulties.      Diagnoses and all orders for this visit:    1. Coronary artery disease with angina pectoris, unspecified vessel or lesion type, unspecified whether native or transplanted heart (Primary)  -     ECG 12 Lead    2. Status post coronary artery bypass graft  -     ECG 12 Lead    3. Essential hypertension  -     ECG 12 Lead    4. Mixed hyperlipidemia  -     ECG 12 Lead    5. Long term (current) use of antithrombotics/antiplatelets  -     ECG 12 Lead                Objective:    Vitals:  Vitals:    08/16/24 1045   BP: 122/60   Pulse: 59   SpO2: 99%   Weight: 68.5 kg (151 lb)   Height: 157.5 cm (62\")         Body mass index is 27.62 kg/m².      Physical " Exam:    General: Alert, cooperative, no distress, appears stated age  Head:  Normocephalic, atraumatic, mucous membranes moist  Eyes:  Conjunctiva/corneas clear, EOM's intact     Neck:  Supple, bilateral carotid bruits  Lungs: Clear to auscultation bilaterally, no wheezes rhonchi rales are noted  Chest wall: No tenderness  Heart::  Regular rate and rhythm, S1 and S2 normal, 1/6 holosystolic murmur.  No, rub or gallop  Abdomen: Soft, non-tender, nondistended bowel sounds active  Extremities: No cyanosis, clubbing, or edema  Pulses: 2+ and symmetric all extremities  Skin:  Bruising and abrasions.  Neuro/psych: A&O x3. CN II through XII are grossly intact with appropriate affect      Allergies:  Allergies   Allergen Reactions    Codeine Unknown - Low Severity     Pt Cannot remember    Levaquin [Levofloxacin] GI Intolerance    Penicillins Rash    Sulfa Antibiotics Unknown (See Comments) and Rash       Medication Review:     Current Outpatient Medications:     aspirin 81 MG tablet, Take 1 tablet by mouth Every Night. HOLD DOS, Disp: , Rfl:     escitalopram (LEXAPRO) 5 MG tablet, Take 1 tablet by mouth Every Night., Disp: , Rfl:     ezetimibe (ZETIA) 10 MG tablet, TAKE 1 TABLET BY MOUTH DAILY, Disp: 90 tablet, Rfl: 0    losartan (COZAAR) 50 MG tablet, Take 1.5 tablets by mouth Daily. Take 1 1/2 tab daily, Disp: , Rfl:     metoprolol succinate XL (TOPROL-XL) 25 MG 24 hr tablet, TAKE 1 TABLET BY MOUTH DAILY, Disp: 90 tablet, Rfl: 2    nitrofurantoin (MACRODANTIN) 50 MG capsule, Take 1 capsule by mouth Daily., Disp: , Rfl:     pantoprazole (PROTONIX) 40 MG EC tablet, TAKE 1 TABLET BY MOUTH DAILY, Disp: 90 tablet, Rfl: 0    rosuvastatin (CRESTOR) 10 MG tablet, TAKE 1 TABLET BY MOUTH DAILY, Disp: 90 tablet, Rfl: 1    temazepam (RESTORIL) 15 MG capsule, Take 1 capsule by mouth At Night As Needed., Disp: , Rfl:     vitamin D3 125 MCG (5000 UT) capsule capsule, Take 1 capsule by mouth Daily., Disp: , Rfl:     Family  History:  Family History   Problem Relation Age of Onset    Diabetes Mother     Malig Hyperthermia Neg Hx        Past Medical History:  Past Medical History:   Diagnosis Date    Abdominal aortic ectasia     3/15/2022    Anxiety     Arthritis     Atrial fibrillation     Cancer     Left breast Cancer and Skin cancer    Coronary artery disease     Frequent UTI     GERD (gastroesophageal reflux disease)     Hyperlipidemia     Hypertension     Localized edema     2022    MRSA infection 2022    LEFT FINGER/ [facility] [place]    Non-healing skin lesion     LEFT INDEX FINGER    Occlusion and stenosis of bilateral carotid arteries     3/15/2022    Other transient cerebral ischemic attacks and related syndromes     2022    Palpitations     Shoulder fracture, left 2024    Sleep apnea     Stricture of artery     2022       Past Surgical History:  Past Surgical History:   Procedure Laterality Date    ANGIOPLASTY SUBCLAVIAN ARTERY Left 2022    Procedure: LEFT SUBCLAVIAN ARTERY STENT PLACEMENT;  Surgeon: Dilshad Domingo MD;  Location: [facility] OR ;  Service: Vascular;  Laterality: Left;    BREAST LUMPECTOMY Left     CARDIAC CATHETERIZATION      CAROTID ENDARTERECTOMY Left 2022    Procedure: LEFT CAROTID ENDARTERECTOMY;  Surgeon: Dilshad Domingo MD;  Location: [facility] OR;  Service: Vascular;  Laterality: Left;    CHOLECYSTECTOMY      COLONOSCOPY  2022    CORONARY ARTERY BYPASS GRAFT      HYSTERECTOMY         Social History:  Social History     Socioeconomic History    Marital status:    Tobacco Use    Smoking status: Former     Current packs/day: 0.00     Average packs/day: 1.5 packs/day for 50.0 years (75.0 ttl pk-yrs)     Types: Cigarettes     Start date:      Quit date:      Years since quittin.6     Passive exposure: Past    Smokeless tobacco: Never   Vaping Use    Vaping status: Never Used   Substance and Sexual Activity    Alcohol  use: Yes     Alcohol/week: 7.0 standard drinks of alcohol     Types: 7 Glasses of wine per week     Comment: routinely     Drug use: No    Sexual activity: Not Currently       Review of Systems:  The following systems were reviewed as they relate to the cardiovascular system: Constitutional, Eyes, ENT, Cardiovascular, Respiratory, Gastrointestinal, Integumentary, Neurological, Psychiatric, Hematologic, Endocrine, Musculoskeletal, and Genitourinary. The pertinent cardiovascular findings are reported above with all other cardiovascular points within those systems being negative.    Diagnostic Study Review:     Current Electrocardiogram:    ECG 12 Lead    Date/Time: 8/16/2024 6:43 PM  Performed by: Jagdeep Perez DO    Authorized by: Jagdeep Perez DO  Comparison: not compared with previous ECG   Previous ECG: no previous ECG available  Comments: Normal sinus rhythm with a ventricular rate of 56 bpm.  Left atrial large minute.  Nonspecific interventricular conduction delay.  Normal QT and QTc intervals.  Normal QRS axis.          Laboratory Data:  Lab Results   Component Value Date    GLUCOSE 104 (H) 10/09/2023    BUN 19 10/09/2023    CREATININE 0.55 (L) 10/09/2023    BCR 34.5 (H) 10/09/2023    K 4.0 10/09/2023    CO2 25.0 10/09/2023    CALCIUM 9.1 10/09/2023    ALBUMIN 3.6 10/05/2023    LABIL2 1.3 01/23/2020    AST 13 10/05/2023    ALT 8 10/05/2023     Lab Results   Component Value Date    GLUCOSE 104 (H) 10/09/2023    CALCIUM 9.1 10/09/2023     10/09/2023    K 4.0 10/09/2023    CO2 25.0 10/09/2023     10/09/2023    BUN 19 10/09/2023    CREATININE 0.55 (L) 10/09/2023    BCR 34.5 (H) 10/09/2023    ANIONGAP 9.0 10/09/2023     Lab Results   Component Value Date    WBC 6.60 10/09/2023    HGB 12.3 10/09/2023    HCT 38.1 10/09/2023    MCV 90.8 10/09/2023     10/09/2023     Lab Results   Component Value Date    CHOL 118 04/15/2022    TRIG 96 04/15/2022    HDL 46 04/15/2022     "LDL 54 04/15/2022     No results found for: \"HGBA1C\"  No results found for: \"INR\", \"PROTIME\"    Most Recent Echo:  Results for orders placed during the hospital encounter of 01/05/23    Adult Transthoracic Echo Complete w/ Color, Spectral and Contrast if necessary per protocol    Interpretation Summary    Left ventricular systolic function is normal. Left ventricular ejection fraction appears to be 56 - 60%.    Left ventricular wall thickness is consistent with mild concentric hypertrophy.    There is mild, posterior mitral leaflet thickening present.    Estimated right ventricular systolic pressure from tricuspid regurgitation is normal (<35 mmHg).       Most Recent Stress Test:  Results for orders placed during the hospital encounter of 01/18/24    Stress Test With Myocardial Perfusion One Day    Interpretation Summary    No scintigraphic evidence for provokable ischemia.    Left ventricular ejection fraction is normal (Calculated EF = 65%).    Fixed apical defect likely secondary to technical factors given normal wall motion in this area    Impressions are consistent with a low risk study.    There is no prior study available for comparison.    Findings consistent with a normal ECG stress test.    Clinical correlation suggested       Most Recent Cardiac Catheterization:   No results found for this or any previous visit.       NOTE:     Today,08/16/2024 ,the following portions of the patient's note were reviewed, confirmed and/or updated as appropriate: History of present illness/Interval history, physical examination, assessment & plan, allergies, current medications, past family history, past medical history, past social history, past surgical history and problem list.    Labs pertinent to today's visit on 08/16/2024 (including but not limited to CBC, CMP, and lipid profiles) were requested from the patient's primary care provider/hospital/clinical laboratory.  If the labs were available for the visit, they were " "reviewed with the patient.  If they were not available, when received, special interest will be made to the labs pertinent to this visit.  The patient's most recent \"in-house\" labs are noted below and have been reviewed.  Outside labs pertinent to this visit are scanned into the record and have been reviewed.    Discussions held today, 08/16/2024,regarding procedures included risk, benefits, and options including but not limited to: Death, MI, stroke, pain, bleeding, infection, and possible need for vascular/thoracic/cardiothoracic surgery.    Copied information within this note was reviewed and is current as of 08/16/2024.    Assessment and plan noted herein represents the current plan of care as of 08/16/2024.    Significant resources from our office and staff are inherent in engaging this patient today,08/16/2024,and in a continuous and active collaborative plan of care related to their chronic cardiovascular conditions outlined below.  The management of these conditions requires the direction of our service with specialized clinical knowledge, skills, and experience.  This collaborative care includes but is not limited to patient education, expectations and responsibilities, shared decision making around therapeutic goals, and shared commitments to achieve those goals.  "

## 2024-08-26 RX ORDER — PANTOPRAZOLE SODIUM 40 MG/1
40 TABLET, DELAYED RELEASE ORAL DAILY
Qty: 90 TABLET | Refills: 2 | Status: SHIPPED | OUTPATIENT
Start: 2024-08-26

## 2024-09-03 RX ORDER — EZETIMIBE 10 MG/1
10 TABLET ORAL DAILY
Qty: 90 TABLET | Refills: 1 | Status: SHIPPED | OUTPATIENT
Start: 2024-09-03

## 2024-09-16 RX ORDER — ROSUVASTATIN CALCIUM 10 MG/1
10 TABLET, COATED ORAL DAILY
Qty: 90 TABLET | Refills: 2 | Status: SHIPPED | OUTPATIENT
Start: 2024-09-16

## 2024-11-12 RX ORDER — METOPROLOL SUCCINATE 25 MG/1
25 TABLET, EXTENDED RELEASE ORAL DAILY
Qty: 90 TABLET | Refills: 2 | Status: SHIPPED | OUTPATIENT
Start: 2024-11-12

## 2025-02-24 NOTE — PROGRESS NOTES
Cardiology Office Visit      Encounter Date:  02/25/2025    Patient ID:   Elizabeth Whaley is a 84 y.o. female.    Reason For Followup:  Coronary artery disease  Hypertension  Hypertensive cardiovascular disease  Hyperlipidemia  Antiplatelet therapy    Brief Clinical History:  Dear Dr. Wadsworth, Gerda Diamond MD    I had the pleasure of seeing Elizabeth Whaley today. As you are well aware, this is a 84 y.o. female with a history of coronary artery disease she is undergone coronary arterial bypass grafting in 2015.  She has additional history that includes hypertension, hypertensive cardiovascular disease, hyperlipidemia, and antiplatelet therapy.  She presents today for follow-up on the above conditions.    Interval History:  She denies any chest pain pressure heaviness or tightness.  She denies any shortness of breath.  She denies any PND orthopnea.  She denies any syncope or near syncope.    She has reported several falls.  She notes that she is not dizzy or lightheaded with this.  She does not have any chest pain or discomfort.  It fact she notes that this occurs exclusively when she makes a quick turn to the right while standing.  Does not occur while she is sitting.  She has suffered a fall requiring surgery for a fractured elbow.    She continues to work at [employer].  She is considering retiring because of her unsteadiness.    We had ordered an ambulatory ECG last time however she did not have this done because she was going to a reunion and this fell through the cracks.  After further discussing with her today, this may be more appropriately referred to ENT.  She has undergone CABG in the past and is due for a stress test.  We will order this.      02/16/2024        She went to an ENT and she is going to [facility] for vestibular therapy. She has not fallen since last episode. Working 2 days a week. She has a very big fear of falling. She is doing a lot of balance training. She had 3 gel shots in each knee and they feel much  better. She reports feeling well from a cardiac perspective. She is getting a CT scan later today of the head.     She had a nuclear stress test performed in January 2024.  We reviewed results today.  No scintigraphic evidence for ischemia was noted.  EF 65% was noted.    08/16/2024        1 month after she was here last time, she fell again. She stopped to get some lottery tickets and she tripped on the curb and fractured her shoulder. No surgery but has some range of motion issues. She is no longer working. She is using a cane now. Gets annual visit next week.  She has no chest pain pressure heaviness or tightness.  No shortness of breath.    02/25/2025        She reports that sometimes she gets some stuff that will scare her. Some pain up in the back onf her shoulder. Sometimes her heart beat is fast. Her mobility is limited and she tries to walk around her home daily.     She had an MRI on her brain because she saw a neurologist. She has had some hoarseness. She is going to see ENT.     Assessment & Plan    Impressions:  Coronary artery disease status post two-vessel CABG in May 2015      SRINATH-RCA, ACOSTA-LAD      Most recent nuclear stress test January 2024 negative for ischemia  Hypertension  Hypertensive cardiovascular disease  Hyperlipidemia  Carotid artery disease status post carotid endarterectomy  Unsteady gait with frequent falls    Recommendations:  Continuation of her current cardiovascular regimen at the present time.     This includes antiplatelet therapy, antihypertensives, short acting nitrates, and statin therapy.  Stress testing January of 2026.  Follow-up in 6 months time sooner should there be difficulties.      Diagnoses and all orders for this visit:    1. Essential hypertension (Primary)    2. Mixed hyperlipidemia    3. Arteriosclerosis of coronary artery    4. Status post coronary artery bypass graft    5. Long term (current) use of antithrombotics/antiplatelets    Other orders  -      "ezetimibe (ZETIA) 10 MG tablet; Take 1 tablet by mouth Daily.  Dispense: 90 tablet; Refill: 3                  Objective:    Vitals:  Vitals:    02/25/25 1348   BP: 146/72   BP Location: Right arm   Patient Position: Sitting   Pulse: 77   SpO2: 96%   Weight: 71.2 kg (157 lb)   Height: 157.5 cm (62\")           Body mass index is 28.72 kg/m².      Physical Exam:    General: Alert, cooperative, no distress, appears stated age  Head:  Normocephalic, atraumatic, mucous membranes moist  Eyes:  Conjunctiva/corneas clear, EOM's intact     Neck:  Supple, bilateral carotid bruits  Lungs: Clear to auscultation bilaterally, no wheezes rhonchi rales are noted  Chest wall: No tenderness  Heart::  Regular rate and rhythm, S1 and S2 normal, 1/6 holosystolic murmur.  No, rub or gallop  Abdomen: Soft, non-tender, nondistended bowel sounds active  Extremities: No cyanosis, clubbing, or edema  Pulses: 2+ and symmetric all extremities  Skin:  Bruising and abrasions.  Neuro/psych: A&O x3. CN II through XII are grossly intact with appropriate affect      Allergies:  Allergies   Allergen Reactions    Codeine Unknown - Low Severity     Pt Cannot remember    Levaquin [Levofloxacin] GI Intolerance    Penicillins Rash    Sulfa Antibiotics Unknown (See Comments) and Rash       Medication Review:     Current Outpatient Medications:     aspirin 81 MG tablet, Take 1 tablet by mouth Every Night. HOLD DOS, Disp: , Rfl:     escitalopram (LEXAPRO) 5 MG tablet, Take 1 tablet by mouth Every Night., Disp: , Rfl:     ezetimibe (ZETIA) 10 MG tablet, Take 1 tablet by mouth Daily., Disp: 90 tablet, Rfl: 3    losartan (COZAAR) 50 MG tablet, Take 1.5 tablets by mouth Daily. Take 1 1/2 tab daily, Disp: , Rfl:     metoprolol succinate XL (TOPROL-XL) 25 MG 24 hr tablet, TAKE 1 TABLET BY MOUTH DAILY, Disp: 90 tablet, Rfl: 2    pantoprazole (PROTONIX) 40 MG EC tablet, TAKE 1 TABLET BY MOUTH DAILY, Disp: 90 tablet, Rfl: 2    rosuvastatin (CRESTOR) 10 MG tablet, TAKE " 1 TABLET BY MOUTH DAILY, Disp: 90 tablet, Rfl: 2    temazepam (RESTORIL) 15 MG capsule, Take 1 capsule by mouth At Night As Needed., Disp: , Rfl:     vitamin D3 125 MCG (5000 UT) capsule capsule, Take 1 capsule by mouth Daily., Disp: , Rfl:     Family History:  Family History   Problem Relation Age of Onset    Diabetes Mother     Malig Hyperthermia Neg Hx        Past Medical History:  Past Medical History:   Diagnosis Date    Abdominal aortic ectasia     3/15/2022    Anxiety     Arthritis     Atrial fibrillation     Cancer     Left breast Cancer and Skin cancer    Coronary artery disease     Frequent UTI     GERD (gastroesophageal reflux disease)     Hyperlipidemia     Hypertension     Localized edema     12/28/2022    MRSA infection 07/06/2022    LEFT FINGER/ [facility] [place]    Non-healing skin lesion     LEFT INDEX FINGER    Occlusion and stenosis of bilateral carotid arteries     3/15/2022    Other transient cerebral ischemic attacks and related syndromes     8/2/2022    Palpitations     Shoulder fracture, left 03/2024    Sleep apnea     Stricture of artery     8/2/2022       Past Surgical History:  Past Surgical History:   Procedure Laterality Date    ANGIOPLASTY SUBCLAVIAN ARTERY Left 08/12/2022    Procedure: LEFT SUBCLAVIAN ARTERY STENT PLACEMENT;  Surgeon: Dilshad Domingo MD;  Location: [facility] OR 18/19;  Service: Vascular;  Laterality: Left;    BREAST LUMPECTOMY Left     CARDIAC CATHETERIZATION      CAROTID ENDARTERECTOMY Left 04/14/2022    Procedure: LEFT CAROTID ENDARTERECTOMY;  Surgeon: Dilshad Domingo MD;  Location: [facility] OR;  Service: Vascular;  Laterality: Left;    CHOLECYSTECTOMY      COLONOSCOPY  11/03/2022    CORONARY ARTERY BYPASS GRAFT      HYSTERECTOMY         Social History:  Social History     Socioeconomic History    Marital status:    Tobacco Use    Smoking status: Former     Current packs/day: 0.00     Average packs/day: 1.5 packs/day for 50.0  years (75.0 ttl pk-yrs)     Types: Cigarettes     Start date: 1965     Quit date: 2015     Years since quitting: 10.1     Passive exposure: Past    Smokeless tobacco: Never   Vaping Use    Vaping status: Never Used   Substance and Sexual Activity    Alcohol use: Yes     Alcohol/week: 7.0 standard drinks of alcohol     Types: 7 Glasses of wine per week     Comment: routinely     Drug use: No    Sexual activity: Not Currently       Review of Systems:  The following systems were reviewed as they relate to the cardiovascular system: Constitutional, Eyes, ENT, Cardiovascular, Respiratory, Gastrointestinal, Integumentary, Neurological, Psychiatric, Hematologic, Endocrine, Musculoskeletal, and Genitourinary. The pertinent cardiovascular findings are reported above with all other cardiovascular points within those systems being negative.    Diagnostic Study Review:     Current Electrocardiogram:  Procedures normal sinus rhythm with a ventricular rate of 68 bpm.  Supraventricular bigeminy.  Normal QT and prolonged QTc intervals of 444 and 4 and at 73 ms respectively.  Normal QRS axis.    Laboratory Data:  Lab Results   Component Value Date    GLUCOSE 104 (H) 10/09/2023    BUN 19 10/09/2023    CREATININE 0.55 (L) 10/09/2023    BCR 34.5 (H) 10/09/2023    K 4.0 10/09/2023    CO2 25.0 10/09/2023    CALCIUM 9.1 10/09/2023    ALBUMIN 3.6 10/05/2023    LABIL2 1.3 01/23/2020    AST 13 10/05/2023    ALT 8 10/05/2023     Lab Results   Component Value Date    GLUCOSE 104 (H) 10/09/2023    CALCIUM 9.1 10/09/2023     10/09/2023    K 4.0 10/09/2023    CO2 25.0 10/09/2023     10/09/2023    BUN 19 10/09/2023    CREATININE 0.55 (L) 10/09/2023    BCR 34.5 (H) 10/09/2023    ANIONGAP 9.0 10/09/2023     Lab Results   Component Value Date    WBC 6.60 10/09/2023    HGB 12.3 10/09/2023    HCT 38.1 10/09/2023    MCV 90.8 10/09/2023     10/09/2023     Lab Results   Component Value Date    CHOL 118 04/15/2022    TRIG 96 04/15/2022  "   HDL 46 04/15/2022    LDL 54 04/15/2022     No results found for: \"HGBA1C\"  No results found for: \"INR\", \"PROTIME\"    Most Recent Echo:  Results for orders placed during the hospital encounter of 01/05/23    Adult Transthoracic Echo Complete w/ Color, Spectral and Contrast if necessary per protocol    Interpretation Summary    Left ventricular systolic function is normal. Left ventricular ejection fraction appears to be 56 - 60%.    Left ventricular wall thickness is consistent with mild concentric hypertrophy.    There is mild, posterior mitral leaflet thickening present.    Estimated right ventricular systolic pressure from tricuspid regurgitation is normal (<35 mmHg).       Most Recent Stress Test:  Results for orders placed during the hospital encounter of 01/18/24    Stress Test With Myocardial Perfusion One Day    Interpretation Summary    No scintigraphic evidence for provokable ischemia.    Left ventricular ejection fraction is normal (Calculated EF = 65%).    Fixed apical defect likely secondary to technical factors given normal wall motion in this area    Impressions are consistent with a low risk study.    There is no prior study available for comparison.    Findings consistent with a normal ECG stress test.    Clinical correlation suggested       Most Recent Cardiac Catheterization:   No results found for this or any previous visit.       NOTE:     Today,02/25/2025 ,the following portions of the patient's note were reviewed, confirmed and/or updated as appropriate: History of present illness/Interval history, physical examination, assessment & plan, allergies, current medications, past family history, past medical history, past social history, past surgical history and problem list.    Labs pertinent to today's visit on 02/25/2025 (including but not limited to CBC, CMP, and lipid profiles) were requested from the patient's primary care provider/hospital/clinical laboratory.  If the labs were available " "for the visit, they were reviewed with the patient.  If they were not available, when received, special interest will be made to the labs pertinent to this visit.  The patient's most recent \"in-house\" labs are noted below and have been reviewed.  Outside labs pertinent to this visit are scanned into the record and have been reviewed.    Discussions held today, 02/25/2025,regarding procedures included risk, benefits, and options including but not limited to: Death, MI, stroke, pain, bleeding, infection, and possible need for vascular/thoracic/cardiothoracic surgery.    Copied information within this note was reviewed and is current as of 02/25/2025.    Assessment and plan noted herein represents the current plan of care as of 02/25/2025.    Significant resources from our office and staff are inherent in engaging this patient today,02/25/2025,and in a continuous and active collaborative plan of care related to their chronic cardiovascular conditions outlined below.  The management of these conditions requires the direction of our service with specialized clinical knowledge, skills, and experience.  This collaborative care includes but is not limited to patient education, expectations and responsibilities, shared decision making around therapeutic goals, and shared commitments to achieve those goals.  "

## 2025-02-25 ENCOUNTER — OFFICE VISIT (OUTPATIENT)
Dept: CARDIOLOGY | Facility: CLINIC | Age: 85
End: 2025-02-25
Payer: MEDICARE

## 2025-02-25 VITALS
BODY MASS INDEX: 28.89 KG/M2 | HEIGHT: 62 IN | OXYGEN SATURATION: 96 % | WEIGHT: 157 LBS | DIASTOLIC BLOOD PRESSURE: 72 MMHG | HEART RATE: 77 BPM | SYSTOLIC BLOOD PRESSURE: 146 MMHG

## 2025-02-25 DIAGNOSIS — I25.10 ARTERIOSCLEROSIS OF CORONARY ARTERY: ICD-10-CM

## 2025-02-25 DIAGNOSIS — Z79.02 LONG TERM (CURRENT) USE OF ANTITHROMBOTICS/ANTIPLATELETS: ICD-10-CM

## 2025-02-25 DIAGNOSIS — I10 ESSENTIAL HYPERTENSION: Primary | ICD-10-CM

## 2025-02-25 DIAGNOSIS — E78.2 MIXED HYPERLIPIDEMIA: ICD-10-CM

## 2025-02-25 DIAGNOSIS — Z95.1 STATUS POST CORONARY ARTERY BYPASS GRAFT: ICD-10-CM

## 2025-02-25 RX ORDER — EZETIMIBE 10 MG/1
10 TABLET ORAL DAILY
Qty: 90 TABLET | Refills: 3 | Status: SHIPPED | OUTPATIENT
Start: 2025-02-25

## 2025-04-03 ENCOUNTER — TELEPHONE (OUTPATIENT)
Dept: CARDIOLOGY | Facility: CLINIC | Age: 85
End: 2025-04-03

## 2025-04-03 NOTE — TELEPHONE ENCOUNTER
Caller: Elizabeth Whaley    Relationship to patient: Self    Best call back number: [phone]    Chief complaint: NA    Type of visit: FU EKG    Requested date: ASAP     If rescheduling, when is the original appointment: NA     Additional notes:PATIENT WAS TOLD BY JM'S NURSE THAT SHE NEEDED TO SCHEDULE A FU FOR DR ACOSTA AND NOT JM FOR EKG. NO NOTES IN CHART, SOMEONE FROM PRACTICE CALLED PATIENT WHILE ON THE PHONE WITH THE HUB PLEASE ADVISE.

## 2025-04-04 ENCOUNTER — OFFICE VISIT (OUTPATIENT)
Dept: CARDIOLOGY | Facility: CLINIC | Age: 85
End: 2025-04-04
Payer: MEDICARE

## 2025-04-04 VITALS
OXYGEN SATURATION: 97 % | BODY MASS INDEX: 28.34 KG/M2 | WEIGHT: 154 LBS | DIASTOLIC BLOOD PRESSURE: 62 MMHG | HEART RATE: 78 BPM | HEIGHT: 62 IN | SYSTOLIC BLOOD PRESSURE: 120 MMHG

## 2025-04-04 DIAGNOSIS — E78.2 MIXED HYPERLIPIDEMIA: ICD-10-CM

## 2025-04-04 DIAGNOSIS — I10 ESSENTIAL HYPERTENSION: ICD-10-CM

## 2025-04-04 DIAGNOSIS — R00.2 PALPITATIONS: Primary | ICD-10-CM

## 2025-04-04 DIAGNOSIS — I25.119 CORONARY ARTERY DISEASE WITH ANGINA PECTORIS, UNSPECIFIED VESSEL OR LESION TYPE, UNSPECIFIED WHETHER NATIVE OR TRANSPLANTED HEART: ICD-10-CM

## 2025-04-04 RX ORDER — FLUOCINOLONE ACETONIDE 0.11 MG/ML
OIL AURICULAR (OTIC)
COMMUNITY
Start: 2025-03-05

## 2025-04-04 RX ORDER — MELOXICAM 7.5 MG/1
TABLET ORAL
COMMUNITY
Start: 2025-03-19

## 2025-04-04 NOTE — PATIENT INSTRUCTIONS
Thank you for following up with cardiology today.  We encourage you to continue taking her medications and engaging healthy lifestyle habits including physical activity and diet with low sodium levels.  We will obtain a Holter monitor to better evaluate your heart.  If you have any questions please let us know.

## 2025-04-04 NOTE — PROGRESS NOTES
Cardiology Office Visit      Encounter Date:  2025    PATIENT IDENTIFICATION    Name: Elizabeth Whaley  Age: 84 y.o. Sex: female : 1940  MRN: [id number]    Reason For Followup:  CAD    Brief Clinical History:  Patient is a 84 y.o.  female  coming to cardiology office for follow up.    Patient has medical history of CAD status post CABG , hypertension, hypertensive cardiovascular disease, hyperlipidemia, carotid artery disease, unsteady gait with frequent falls.    Patient was seen in the office by Dr Perez in 2025.  She had vague, nonspecific symptoms.  Plan was to repeat stress test in 2026.    Today patient complains of irregular heartbeats.  She was told by different physicians over the past month she was having irregular heartbeats.  She denies dizziness, lightheadedness, presyncope or syncope.  She states she does not feel frequent palpitations, but she got concerned when she heard she had irregular heartbeat.  EKG in the office today showed normal sinus rhythm with PACs.    Current medications include aspirin 81, losartan 50, Toprol 25, rosuvastatin 10, Zetia 10, pantoprazole 40    Most recent labs in patient's chart:  Total cholesterol 118, LDL 54, creatinine 0.55, potassium 4.0, sodium 137    Prior cardiology workup.  Degree stress test 2024    No scintigraphic evidence for provokable ischemia.    Left ventricular ejection fraction is normal (Calculated EF = 65%).    Fixed apical defect likely secondary to technical factors given normal wall motion in this area    Impressions are consistent with a low risk study.    There is no prior study available for comparison.    Findings consistent with a normal ECG stress test.    Clinical correlation suggested  Assessment & Plan    Impressions:  Coronary artery disease status post two-vessel CABG in May 2015      SRINATH-RCA, ACOSTA-LAD      Most recent nuclear stress test 2024 negative for ischemia  Hypertension  Hypertensive  "cardiovascular disease  Hyperlipidemia  Carotid artery disease status post carotid endarterectomy  Unsteady gait with frequent falls    Recommendations:  Will check 48-hour Holter monitor for evaluation of PAC burden and rule out any other arrhythmias.  Educated patient about benign nature of PACs.  Advised patient to continue to be compliant with her medications.  She verbalized understanding and agreed with this plan.    Diagnoses and all orders for this visit:    1. Palpitations (Primary)  -     Cardiac Event Monitor (VARGHESE) or Mobile Cardiac Outpatient Telemetry (MCT); Future          Objective:    Vitals:  Vitals:    04/04/25 0959   BP: 120/62   BP Location: Left arm   Patient Position: Sitting   Cuff Size: Large Adult   Pulse: 78   SpO2: 97%   Weight: 69.9 kg (154 lb)   Height: 157.5 cm (62\")     Body mass index is 28.17 kg/m².      Physical Exam:  General: Alert, cooperative, no distress, appears stated age  Lungs:  Clear to auscultation bilaterally, no wheezes, rhonchi or rales are noted  Chest wall: No tenderness  Heart::  Regular rate and irregular rhythm, S1 and S2 normal, no murmur.  No rub or gallop  Abdomen: Soft, nontender, nondistended, bowel sounds active  Extremities: No cyanosis, clubbing, or edema  Neuro/psych: No gross focal deficits        Allergies:  Allergies   Allergen Reactions    Codeine Unknown - Low Severity     Pt Cannot remember    Levaquin [Levofloxacin] GI Intolerance    Penicillins Rash    Sulfa Antibiotics Unknown (See Comments) and Rash       Medication Review:     Current Outpatient Medications:     aspirin 81 MG tablet, Take 1 tablet by mouth Every Other Day., Disp: , Rfl:     escitalopram (LEXAPRO) 5 MG tablet, Take 1 tablet by mouth Every Night., Disp: , Rfl:     ezetimibe (ZETIA) 10 MG tablet, Take 1 tablet by mouth Daily., Disp: 90 tablet, Rfl: 3    fluocinolone acetonide (DERMOTIC) 0.01 % oil otic oil, INSTILL 2 DROPS IN BOTH EARS TWICE DAILY AS NEEDED FOR ITCHING, Disp: , " Rfl:     losartan (COZAAR) 50 MG tablet, Take 1.5 tablets by mouth Daily. Take 1 1/2 tab daily, Disp: , Rfl:     meloxicam (MOBIC) 7.5 MG tablet, TAKE 1 TABLET BY MOUTH DAILY. STOP TAKING IF YOU GET UPSET STOMACH. MAKE SURE YOUR PRIMARY CARE DOCTOR KNOWS YOU ARE TAKING AN ANTIINFLAMMATORY, Disp: , Rfl:     metoprolol succinate XL (TOPROL-XL) 25 MG 24 hr tablet, TAKE 1 TABLET BY MOUTH DAILY, Disp: 90 tablet, Rfl: 2    NON FORMULARY, [place] for UTI, Disp: , Rfl:     pantoprazole (PROTONIX) 40 MG EC tablet, TAKE 1 TABLET BY MOUTH DAILY, Disp: 90 tablet, Rfl: 2    rosuvastatin (CRESTOR) 10 MG tablet, TAKE 1 TABLET BY MOUTH DAILY, Disp: 90 tablet, Rfl: 2    temazepam (RESTORIL) 15 MG capsule, Take 1 capsule by mouth At Night As Needed., Disp: , Rfl:     vitamin D3 125 MCG (5000 UT) capsule capsule, Take 1 capsule by mouth Daily., Disp: , Rfl:     Family History:  Family History   Problem Relation Age of Onset    Diabetes Mother     Malig Hyperthermia Neg Hx        Past Medical History:  Past Medical History:   Diagnosis Date    Abdominal aortic ectasia     3/15/2022    Anxiety     Arthritis     Atrial fibrillation     Cancer     Left breast Cancer and Skin cancer    Coronary artery disease     Frequent UTI     GERD (gastroesophageal reflux disease)     Hyperlipidemia     Hypertension     Localized edema     12/28/2022    MRSA infection 07/06/2022    LEFT FINGER/ [facility] [place]    Non-healing skin lesion     LEFT INDEX FINGER    Occlusion and stenosis of bilateral carotid arteries     3/15/2022    Other transient cerebral ischemic attacks and related syndromes     8/2/2022    Palpitations     Shoulder fracture, left 03/2024    Sleep apnea     Stricture of artery     8/2/2022       Past Surgical History:  Past Surgical History:   Procedure Laterality Date    ANGIOPLASTY SUBCLAVIAN ARTERY Left 08/12/2022    Procedure: LEFT SUBCLAVIAN ARTERY STENT PLACEMENT;  Surgeon: Dilshad Domingo MD;  Location:   [facility] 18[facility]19;  Service: Vascular;  Laterality: Left;    BREAST LUMPECTOMY Left     CARDIAC CATHETERIZATION      CAROTID ENDARTERECTOMY Left 04/14/2022    Procedure: LEFT CAROTID ENDARTERECTOMY;  Surgeon: Dilshad Domingo MD;  Location: [facility] OR;  Service: Vascular;  Laterality: Left;    CHOLECYSTECTOMY      COLONOSCOPY  11/03/2022    CORONARY ARTERY BYPASS GRAFT      HYSTERECTOMY         Social History:  Social History     Socioeconomic History    Marital status:    Tobacco Use    Smoking status: Former     Current packs/day: 0.00     Average packs/day: 1.5 packs/day for 50.0 years (75.0 ttl pk-yrs)     Types: Cigarettes     Start date: 1965     Quit date: 2015     Years since quitting: 10.2     Passive exposure: Past    Smokeless tobacco: Never   Vaping Use    Vaping status: Never Used   Substance and Sexual Activity    Alcohol use: Yes     Alcohol/week: 7.0 standard drinks of alcohol     Types: 7 Glasses of wine per week     Comment: routinely     Drug use: No    Sexual activity: Not Currently       Review of Systems:  The following systems were reviewed as they relate to the cardiovascular system: Constitutional, Eyes, ENT, Cardiovascular, Respiratory, Gastrointestinal, Integumentary, Neurological, Psychiatric, Hematologic, Endocrine, Musculoskeletal, and Genitourinary. The pertinent cardiovascular findings are reported above with all other cardiovascular points within those systems being negative.    Diagnostic Study Review:     Current Electrocardiogram:    ECG 12 Lead    Date/Time: 4/4/2025 10:23 AM  Performed by: Cesar Gibson MD    Authorized by: Cesar Gibson MD  Comparison: compared with previous ECG   Similar to previous ECG  Rhythm: sinus rhythm  Ectopy: atrial premature contractions  Rate: normal  Conduction: conduction normal  QRS axis: normal    Clinical impression: non-specific ECG          Laboratory Data:  Lab Results  "  Component Value Date    GLUCOSE 104 (H) 10/09/2023    BUN 19 10/09/2023    CREATININE 0.55 (L) 10/09/2023    BCR 34.5 (H) 10/09/2023    K 4.0 10/09/2023    CO2 25.0 10/09/2023    CALCIUM 9.1 10/09/2023    ALBUMIN 3.6 10/05/2023    LABIL2 1.3 01/23/2020    AST 13 10/05/2023    ALT 8 10/05/2023     Lab Results   Component Value Date    GLUCOSE 104 (H) 10/09/2023    CALCIUM 9.1 10/09/2023     10/09/2023    K 4.0 10/09/2023    CO2 25.0 10/09/2023     10/09/2023    BUN 19 10/09/2023    CREATININE 0.55 (L) 10/09/2023    BCR 34.5 (H) 10/09/2023    ANIONGAP 9.0 10/09/2023     Lab Results   Component Value Date    WBC 6.60 10/09/2023    HGB 12.3 10/09/2023    HCT 38.1 10/09/2023    MCV 90.8 10/09/2023     10/09/2023     Lab Results   Component Value Date    CHOL 118 04/15/2022    TRIG 96 04/15/2022    HDL 46 04/15/2022    LDL 54 04/15/2022     No results found for: \"HGBA1C\"  No results found for: \"INR\", \"PROTIME\"    Most Recent Echo:  Results for orders placed during the hospital encounter of 01/05/23    Adult Transthoracic Echo Complete w/ Color, Spectral and Contrast if necessary per protocol    Interpretation Summary    Left ventricular systolic function is normal. Left ventricular ejection fraction appears to be 56 - 60%.    Left ventricular wall thickness is consistent with mild concentric hypertrophy.    There is mild, posterior mitral leaflet thickening present.    Estimated right ventricular systolic pressure from tricuspid regurgitation is normal (<35 mmHg).       Most Recent Stress Test:  Results for orders placed during the hospital encounter of 01/18/24    Stress Test With Myocardial Perfusion One Day    Interpretation Summary    No scintigraphic evidence for provokable ischemia.    Left ventricular ejection fraction is normal (Calculated EF = 65%).    Fixed apical defect likely secondary to technical factors given normal wall motion in this area    Impressions are consistent with a low risk " "study.    There is no prior study available for comparison.    Findings consistent with a normal ECG stress test.    Clinical correlation suggested       Most Recent Cardiac Catheterization:   No results found for this or any previous visit.       NOTE: The following portions of the patient's note were reviewed, confirmed and/or updated this visit as appropriate: History of present illness/Interval history, physical examination, assessment & plan, allergies, current medications, past family history, past medical history, past social history, past surgical history and problem list.    Labs pertinent to today's visit on 04/04/2025 (including but not limited to CBC, CMP, and lipid profiles) were requested from the patient's primary care provider/hospital/clinical laboratory.  If the labs were available for the visit, they were reviewed with the patient.  If they were not available, when received, special interest will be made to the labs pertinent to this visit.  The patient's most recent \"in-house\" labs are noted below and have been reviewed.  Outside labs pertinent to this visit are scanned into the record and have been reviewed.    Discussions held today, 04/04/2025,regarding procedures included risk, benefits, and options including but not limited to: Death, MI, stroke, pain, bleeding, infection, and possible need for vascular/thoracic/cardiothoracic surgery.    Copied information within this note was reviewed and is current as of 04/04/2025.    Assessment and plan noted herein represents the current plan of care as of 04/04/2025.    Significant resources from our office and staff are inherent in engaging this patient in a continuous and active collaborative plan of care related to their chronic cardiovascular conditions outlined herein.  The management of these conditions requires the direction of our service with specialized clinical knowledge, skills, and experience.  This collaborative care includes but is not " limited to patient education, expectations and responsibilities, shared decision making around therapeutic goals, and shared commitments to achieve those goals.

## 2025-04-09 ENCOUNTER — HOSPITAL ENCOUNTER (OUTPATIENT)
Dept: CARDIOLOGY | Facility: HOSPITAL | Age: 85
Discharge: HOME | End: 2025-04-09
Payer: MEDICARE

## 2025-04-09 DIAGNOSIS — R00.2 PALPITATIONS: ICD-10-CM

## 2025-04-17 DIAGNOSIS — I77.1 SUBCLAVIAN ARTERY STENOSIS: ICD-10-CM

## 2025-04-17 DIAGNOSIS — I65.23 BILATERAL CAROTID ARTERY OCCLUSION: Primary | ICD-10-CM

## 2025-04-22 LAB
CV ZIO BASELINE AVG BPM: 76
CV ZIO BASELINE BPM HIGH: 122
CV ZIO BASELINE BPM LOW: 53

## 2025-05-13 ENCOUNTER — TELEPHONE (OUTPATIENT)
Dept: CARDIOLOGY | Facility: CLINIC | Age: 85
End: 2025-05-13

## 2025-05-13 NOTE — TELEPHONE ENCOUNTER
Caller: Elizabeth Whaley    Relationship: Self    Best call back number:       PATIENT STATES SHE HAS HAD 3 OTHER PROVIDERS TELL HER THAT SHE HAS AN IRREGULAR HEART RATE SINCE THE BEGINNING OF THE YEAR.    PATIENT WAS SEEN IN FEBRUARY BY YOU AND NOTHING WAS MENTIONED OF THIS.    APPT WITH  IN APRIL AND HOLTER CAME BACK WITH NOTHING, BUT SHE DOES NOT FEEL COMFORTABLE WITH THE RESULTS.      PATIENT IS WANTING A CALL BACK FROM  HIMSELF.

## 2025-05-13 NOTE — TELEPHONE ENCOUNTER
I spoke with the patient, reviewed the holter and explained what it showed. The message has been sent to Dr Perez. She is aware he is at the hospital and it could be a few days before he calls her back.

## 2025-05-20 RX ORDER — PANTOPRAZOLE SODIUM 40 MG/1
40 TABLET, DELAYED RELEASE ORAL DAILY
Qty: 90 TABLET | Refills: 2 | Status: SHIPPED | OUTPATIENT
Start: 2025-05-20

## 2025-05-27 ENCOUNTER — TELEPHONE (OUTPATIENT)
Age: 85
End: 2025-05-27
Payer: MEDICARE

## 2025-05-27 NOTE — TELEPHONE ENCOUNTER
I spoke with patient and scheduled her 1 year follow up with scans. Patient requested to push appt out until September due to knee surgery that is scheduled in June/July

## 2025-05-27 NOTE — TELEPHONE ENCOUNTER
Caller: Elizabeth Whaley    Relationship: Self    Best call back number: [phone]     What is the best time to reach you: ANYTIME    Who are you requesting to speak with (clinical staff, provider,  specific staff member): SCHEDULING STAFF    Do you know the name of the person who called: PT IS NOT SURE     What was the call regarding: PT HAD A CALL A WEEK OR SO AGO TO SCHEDULE. SHE SAID SHE WOULD CALL BACK AND FORGOT. SHE IS NOT SURE WHO SHE SPOKE WITH AND NO TE IN THE CHART. PT SAYS SHE IS PRETTY SURE DR. LOYA TOLD HER THAT SHE DID NOT NEED TO CONTINUE F/U APTS. SHE WOULD LIKE TO DISCUSS THIS WITH THE OFFICE. PLEASE CALL TO ADVISE ON SCHEDULING.     Is it okay if the provider responds through [facility]: NO

## 2025-06-09 RX ORDER — ROSUVASTATIN CALCIUM 10 MG/1
10 TABLET, COATED ORAL DAILY
Qty: 90 TABLET | Refills: 2 | Status: SHIPPED | OUTPATIENT
Start: 2025-06-09

## 2025-08-26 RX ORDER — METOPROLOL SUCCINATE 25 MG/1
25 TABLET, EXTENDED RELEASE ORAL DAILY
Qty: 90 TABLET | Refills: 2 | Status: SHIPPED | OUTPATIENT
Start: 2025-08-26

## 2025-08-28 ENCOUNTER — OFFICE VISIT (OUTPATIENT)
Dept: CARDIOLOGY | Facility: CLINIC | Age: 85
End: 2025-08-28
Payer: MEDICARE

## 2025-08-28 VITALS
HEIGHT: 55 IN | SYSTOLIC BLOOD PRESSURE: 110 MMHG | OXYGEN SATURATION: 96 % | WEIGHT: 148 LBS | BODY MASS INDEX: 34.25 KG/M2 | HEART RATE: 80 BPM | DIASTOLIC BLOOD PRESSURE: 71 MMHG

## 2025-08-28 DIAGNOSIS — E78.2 MIXED HYPERLIPIDEMIA: ICD-10-CM

## 2025-08-28 DIAGNOSIS — Z79.02 LONG TERM (CURRENT) USE OF ANTITHROMBOTICS/ANTIPLATELETS: ICD-10-CM

## 2025-08-28 DIAGNOSIS — I10 ESSENTIAL HYPERTENSION: ICD-10-CM

## 2025-08-28 DIAGNOSIS — Z95.1 STATUS POST CORONARY ARTERY BYPASS GRAFT: ICD-10-CM

## 2025-08-28 DIAGNOSIS — I73.9 PAD (PERIPHERAL ARTERY DISEASE): ICD-10-CM

## 2025-08-28 DIAGNOSIS — I25.119 CORONARY ARTERY DISEASE INVOLVING NATIVE CORONARY ARTERY OF NATIVE HEART WITH ANGINA PECTORIS: Primary | ICD-10-CM

## (undated) DEVICE — GOWN,REINFRCE,POLY,SIRUS,BREATH SLV,XXLG: Brand: MEDLINE

## (undated) DEVICE — SUT SILK 3/0 SH CR5 18IN C0135

## (undated) DEVICE — UNDYED BRAIDED (POLYGLACTIN 910), SYNTHETIC ABSORBABLE SUTURE: Brand: COATED VICRYL

## (undated) DEVICE — CVR PROB 96IN LF STRL

## (undated) DEVICE — SLV SCD CALF HEMOFORCE DVT THERP REPROC MD

## (undated) DEVICE — SINGLE BASIN: Brand: CARDINAL HEALTH

## (undated) DEVICE — COUNT NDL MAG XLG

## (undated) DEVICE — PK MINOR VASC 50

## (undated) DEVICE — STERILE COTTON BALLS LARGE 5/P: Brand: MEDLINE

## (undated) DEVICE — RADIFOCUS GLIDEWIRE: Brand: GLIDEWIRE

## (undated) DEVICE — LP VESL MAXI 2.5X1MM RED 2PK

## (undated) DEVICE — TUBING, SUCTION, 1/4" X 20', STRAIGHT: Brand: MEDLINE INDUSTRIES, INC.

## (undated) DEVICE — NAVICROSS SUPPORT CATHETER: Brand: NAVICROSS

## (undated) DEVICE — INFLATION DEVICE: Brand: ENCORE™ 26

## (undated) DEVICE — IRRIGATOR BULB ASEPTO 60CC STRL

## (undated) DEVICE — INTENDED FOR TISSUE SEPARATION, AND OTHER PROCEDURES THAT REQUIRE A SHARP SURGICAL BLADE TO PUNCTURE OR CUT.: Brand: BARD-PARKER ® CARBON RIB-BACK BLADES

## (undated) DEVICE — GLV SURG BIOGEL LTX PF 8 1/2

## (undated) DEVICE — KT SURG TURNOVER 050

## (undated) DEVICE — TRAP FLD MINIVAC MEGADYNE 100ML

## (undated) DEVICE — 3M™ IOBAN™ 2 ANTIMICROBIAL INCISE DRAPE 6650EZ: Brand: IOBAN™ 2

## (undated) DEVICE — SUT SILK 3/0 SH CR8 18IN C013D

## (undated) DEVICE — TBG PENCL TELESCP MEGADYNE SMOKE EVAC 10FT

## (undated) DEVICE — ADHS SKIN PREMIERPRO EXOFIN TOPICAL HI/VISC .5ML

## (undated) DEVICE — GOWN,REINF,POLY,SIRUS,BRTH SLV,XLNG/XXL: Brand: MEDLINE

## (undated) DEVICE — APPL CHLORAPREP HI/LITE TINTED 10.5ML ORNG

## (undated) DEVICE — SYRINGE KIT,PACKAGED,,150FT,MK 7(ANGIO-ARTERION, 150ML SYR KIT W/QFT,MC)(60729385): Brand: MEDRAD® MARK 7 ARTERION DISPOSABLE SYRINGE 150 ML WITH QUICK FILL TUBE

## (undated) DEVICE — SPNG LAP 18X18IN LF STRL PK/5

## (undated) DEVICE — PATIENT RETURN ELECTRODE, SINGLE-USE, CONTACT QUALITY MONITORING, ADULT, WITH 9FT CORD, FOR PATIENTS WEIGING OVER 33LBS. (15KG): Brand: MEGADYNE

## (undated) DEVICE — PTA BALLOON DILATATION CATHETER: Brand: MUSTANG™

## (undated) DEVICE — SHLD ANGIO 2LAYR CIR FEN

## (undated) DEVICE — COVER,MAYO STAND,STERILE: Brand: MEDLINE

## (undated) DEVICE — VESSEL LOOPS X-RAY DETECTABLE: Brand: DEROYAL

## (undated) DEVICE — Device

## (undated) DEVICE — SUT SILK 3/0 TIES 18IN A184H

## (undated) DEVICE — SPNG LAP PREWSH SFTPK 18X18IN STRL PK/5

## (undated) DEVICE — ANTIBACTERIAL UNDYED BRAIDED (POLYGLACTIN 910), SYNTHETIC ABSORBABLE SUTURE: Brand: COATED VICRYL

## (undated) DEVICE — APPL COTN TP PLSTC 6IN STRL LF PK/2

## (undated) DEVICE — CATH SZ ACCUVU SEG/20CM PIG .038IN 5F 70CM

## (undated) DEVICE — RADIFOCUS GLIDEWIRE ADVANTAGE GUIDEWIRE: Brand: GLIDEWIRE ADVANTAGE

## (undated) DEVICE — TOTAL TRAY, 16FR 10ML SIL FOLEY, URN: Brand: MEDLINE

## (undated) DEVICE — CATHETER,URETHRAL,REDRUBBER,STERILE,20FR: Brand: MEDLINE

## (undated) DEVICE — TOWEL,OR,DSP,ST,WHITE,DLX,XR,4/PK,20PK/C: Brand: MEDLINE

## (undated) DEVICE — SOLUTION,WATER,IRRIGATION,1000ML,STERILE: Brand: MEDLINE

## (undated) DEVICE — SUT SILK 4/0 TIES 18IN A183H

## (undated) DEVICE — GAUZE,SPONGE,4"X4",16PLY,XRAY,STRL,LF: Brand: MEDLINE

## (undated) DEVICE — TP UMB COTN 1/8X18 TU10T

## (undated) DEVICE — WIPE INST MEROCEL

## (undated) DEVICE — SOL IRRIG NACL 1000ML

## (undated) DEVICE — SOL NACL 0.9PCT 1000ML

## (undated) DEVICE — SCANLAN® SUTURE BOOT™ INSTRUMENT JAW COVERS - ORIGINAL YELLOW, STANDARD PKG (5 PAIR/CARTRIDGE, 1 CARTRIDGE/PKG): Brand: SCANLAN® SUTURE BOOT™ INSTRUMENT JAW COVERS

## (undated) DEVICE — GW AMPLTZ SUPERSTIFF SHT/TPR STR .035IN 260CM

## (undated) DEVICE — SUT PROLN 6/0 BV1 D/A 30IN 8709H

## (undated) DEVICE — PINNACLE R/O II INTRODUCER SHEATH WITH RADIOPAQUE MARKER: Brand: PINNACLE

## (undated) DEVICE — UNDERGLV SURG BIOGEL INDICAT PI SZ8.5 BLU

## (undated) DEVICE — PK ANGIO CERBRL RAD 40

## (undated) DEVICE — MICRO TIP WIPE: Brand: DEVON

## (undated) DEVICE — PENCL HND ROCKRSWTCH HOLSTR EZ CLEAN TP CRD 10FT

## (undated) DEVICE — SUT SILK 2/0 TIES 18IN A185H

## (undated) DEVICE — SUT VIC 3/0 SH 27IN J416H

## (undated) DEVICE — ADHS SKIN SURG TISS VISC PREMIERPRO EXOFIN HI/VISC FAST/DRY

## (undated) DEVICE — ISOLATION BAG: Brand: CONVERTORS

## (undated) DEVICE — TOWEL,OR,DSP,ST,WHITE,DLX,4/PK,20PK/CS: Brand: MEDLINE

## (undated) DEVICE — RADIFOCUS TORQUE DEVICE MULTI-TORQUE VISE: Brand: RADIFOCUS TORQUE DEVICE

## (undated) DEVICE — CATH TEMPO 5F BER II 65CM: Brand: TEMPO

## (undated) DEVICE — ST ACC MICROPUNCTURE STFF .018 ECHO/PLDM/TP 4F/10CM 21G/7CM

## (undated) DEVICE — SUNDT™ EXTERNAL CAROTID ENDARTERECTOMY SHUNT: Brand: SUNDT™

## (undated) DEVICE — INTROFLXOR CHECKFLO .059 ANL0 4F90